# Patient Record
Sex: FEMALE | Race: WHITE | NOT HISPANIC OR LATINO | URBAN - METROPOLITAN AREA
[De-identification: names, ages, dates, MRNs, and addresses within clinical notes are randomized per-mention and may not be internally consistent; named-entity substitution may affect disease eponyms.]

---

## 2020-12-11 ENCOUNTER — TELEPHONE (OUTPATIENT)
Dept: RADIATION ONCOLOGY | Facility: HOSPITAL | Age: 55
End: 2020-12-11

## 2020-12-11 DIAGNOSIS — C55 MALIGNANT NEOPLASM OF UTERUS, UNSPECIFIED SITE (HCC): Primary | ICD-10-CM

## 2020-12-11 NOTE — TELEPHONE ENCOUNTER
Called patient's daughter Socorro at [phone] and tried to schedule patient for consultation. Had to [facility]. UL Dr. Jama's office referred patient for uterine cancer. [phone] option 1 Christy is the contact. According to Christy patient is wanting to try and schedule same day appointments with Dr. Neal and Dr. Glovre.

## 2020-12-14 ENCOUNTER — TELEPHONE (OUTPATIENT)
Dept: RADIATION ONCOLOGY | Facility: HOSPITAL | Age: 55
End: 2020-12-14

## 2020-12-14 NOTE — TELEPHONE ENCOUNTER
I requested the CT of the Abdomen and Pelvis from Klaus.  Kvng stated that we should get it on 12-.   Itzel Lund RN

## 2020-12-15 ENCOUNTER — TELEPHONE (OUTPATIENT)
Dept: ONCOLOGY | Facility: HOSPITAL | Age: 55
End: 2020-12-15

## 2020-12-15 NOTE — TELEPHONE ENCOUNTER
Case Management/ Note    Patient Name: Xin Nj  YOB: 1965  MRN #: [id number]    OSW called patient at the request of soheila Newsome as patient has issues with transportation. She said she does not have a vehicle and has to rely on others for everything, including medical transportation. She has medical transportation available to her via [employer]. She said she has tried using them before but oftentimes they call to let her know she does not have a ride or does not show. She said her daughter would be unable to leave work, especially daily, due to her job. She could not readily think of any other friends or family that could help. She said she was told by her doctor's office that there was transportation available for cancer patients from [place], which is why she choose to come here vs [facility].  OSW asked her what that was and she did not know specifics. She was asked to call her doctor's office and ask specifically so we could work together to see if we can get her transportation. She said she would do this and call back. The [employer] is no longer offering transportation due to covid.     Electronically signed by:   Eladia Laguna LCSW, OSW-C  12/15/20, 10:55 EST

## 2020-12-16 ENCOUNTER — TELEPHONE (OUTPATIENT)
Dept: ONCOLOGY | Facility: HOSPITAL | Age: 55
End: 2020-12-16

## 2020-12-16 ENCOUNTER — TELEPHONE (OUTPATIENT)
Dept: ONCOLOGY | Facility: CLINIC | Age: 55
End: 2020-12-16

## 2020-12-16 NOTE — TELEPHONE ENCOUNTER
Case Management/ Note    Patient Name: Xin Nj  YOB: 1965  MRN #: [id number]    OSW called patient at the request of soheila Newsome. Patient is alert and oriented to person, place and time. She said she does not have transportation for her upcoming appointments but will call [employer] to see if they will pick her up. maira Newsomer is trying to get her into see Dr. Neal this week and she has an appointment with Dr. Glover on Monday.     OSW spoke with Lamar, practice manager and Paresh, supervisor about patient's barriers to transportation and that she is closer to [facility] ([address] miles one [address] to [facility]; [address] miles [address] to [facility]). Suggested that patient be transferred to [facility] so that she has better possibility of [employer] helping her with transportation thus having better continuity of care.     Electronically signed by:   Eladia Laguna LCSW, OSW-C  12/16/20, 16:12 EST

## 2020-12-16 NOTE — TELEPHONE ENCOUNTER
Have been trying to call patient to make appointment for her to see dr soriano the voicemail is full try yesterday and today

## 2020-12-17 ENCOUNTER — TELEPHONE (OUTPATIENT)
Dept: RADIATION ONCOLOGY | Facility: HOSPITAL | Age: 55
End: 2020-12-17

## 2020-12-17 NOTE — TELEPHONE ENCOUNTER
Spoke with Xin to explain [facility] would be providing her Radiation Treatments.   Called [facility] to explain they would need to send a referral to [facility] for Xin Clem due to we cannot provide transportation for Xin. Her insurance will provide but, to a closer facility with the same services. Xin understood why the transfer was being made. I cancelled her consult appt with Dr Glover for 12/21/2020.

## 2020-12-31 ENCOUNTER — TELEPHONE (OUTPATIENT)
Dept: RADIATION ONCOLOGY | Facility: HOSPITAL | Age: 55
End: 2020-12-31

## 2021-01-04 ENCOUNTER — TELEPHONE (OUTPATIENT)
Dept: ONCOLOGY | Facility: CLINIC | Age: 56
End: 2021-01-04

## 2021-01-04 NOTE — TELEPHONE ENCOUNTER
Call patient regarding appointment for today the phone ring she answer then hung up so when I call back no answer had to leave a message

## 2021-01-06 ENCOUNTER — TELEPHONE (OUTPATIENT)
Dept: ONCOLOGY | Facility: HOSPITAL | Age: 56
End: 2021-01-06

## 2021-01-06 NOTE — TELEPHONE ENCOUNTER
Case Management/ Note    Patient Name: Xin Nj  YOB: 1965  MRN #: [id number]    OSW received a call from Paresh, supervisor who said patient was having challenges with the transfer to [facility] for cancer care as she has barriers to transportation. OSW called and left a message to speak with the oncology social worker at [facility]. Also, called Xin and left a message requesting call back.     Electronically signed by:   Eladia Laguna LCSW, OSW-C  01/06/21, 10:45 EST

## 2021-01-07 ENCOUNTER — TELEPHONE (OUTPATIENT)
Dept: ONCOLOGY | Facility: HOSPITAL | Age: 56
End: 2021-01-07

## 2021-01-07 NOTE — TELEPHONE ENCOUNTER
Case Management/ Note    Patient Name: Xin Nj  YOB: 1965  MRN #: [id number]    OSW received a call back from Brandi  at [facility]. She was not aware of patient referral. She said they have the same challenges with transportation services as is here. She is willing to reach out to the patient but OSW declined until she could get more information from the patient. OSW called patient and left a message requesting call back.     OSW received a call back from Xin requesting call back. Called back and VM is full.       Electronically signed by:   Eladia Laguna LCSW, OSW-C  01/07/21, 16:20 EST

## 2021-01-08 ENCOUNTER — TELEPHONE (OUTPATIENT)
Dept: ONCOLOGY | Facility: CLINIC | Age: 56
End: 2021-01-08

## 2021-01-12 ENCOUNTER — TELEPHONE (OUTPATIENT)
Dept: ONCOLOGY | Facility: CLINIC | Age: 56
End: 2021-01-12

## 2021-01-12 ENCOUNTER — APPOINTMENT (OUTPATIENT)
Dept: LAB | Facility: HOSPITAL | Age: 56
End: 2021-01-12

## 2021-01-12 NOTE — TELEPHONE ENCOUNTER
LM ASKING PT TO CALL MY DIRECT LINE TO RESCHEDULE MISSED NEW PATIENT APPOINTMENT. DR DICKEY HAS ASKED THAT I OFFER  PT 1/18/21 @ 830AM.

## 2021-01-14 ENCOUNTER — TELEPHONE (OUTPATIENT)
Dept: ONCOLOGY | Facility: CLINIC | Age: 56
End: 2021-01-14

## 2021-01-14 NOTE — TELEPHONE ENCOUNTER
LEFT ADDITIONAL MESSAGE ASKING PT TO CALL MY DIRECT LINE TO R/S HER MISSED NEW PATIENT APPOINTMENT

## 2021-01-20 ENCOUNTER — TELEPHONE (OUTPATIENT)
Dept: RADIATION ONCOLOGY | Facility: HOSPITAL | Age: 56
End: 2021-01-20

## 2021-01-20 NOTE — TELEPHONE ENCOUNTER
"Spoke with Xin to confirm her consult appt with Dr Glover on 1/22/21. I asked if she had transportation scheduled. She stated \"yes\". I asked her if she needed to be transferred to schedule appt with Val. Call was transferred to Lora ROSE   "

## 2021-01-21 NOTE — PROGRESS NOTES
Hematology/Oncology Outpatient Consultation    Patient name: Xin Nj  : 1965  MRN: [id number]  Primary Care Physician: Park Dubose MD  Referring Physician: Park Dubose,*  Reason For Consult:     Chief Complaint   Patient presents with   • Appointment     Uterine cancer       History of Present Illness:    This is a 55-year-old female who developed postmenopausal vaginal bleeding for approximately one and half years.  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  On 2020-10-31 patient had CT scan of the abdomen and pelvis with contrast there were no acute findings in the abdomen or pelvis.  Stable hypodense lesion in the liver   Patient was then referred to Dr. Jama with [facility] and on 11/10/2020 patient underwent   total robotic hysterectomy, bilateral salpingo-oophorectomy and sentinel lymphadenectomy.  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  There was no significant pathology identified in the right or left ovaries.  One left pelvic sentinel lymph node was benign for malignancy.  On the right pelvic  7 benign lymph nodes we are removed.  The mass measured 7 cm in size.  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  There was evidence of lymphovascular invasion.  Pathology stage is pT1bN0 FIGO 1B    Dr. Jama and team have recommended adjuvant chemotherapy with carboplatinum and Taxol sandwiched with external beam radiation and vaginal cuff brachytherapy.  Total of 6 cycles of chemotherapy with carboplatinum and Taxol has been recommended with 3 cycles prior to radiation and subsequent 3 cycles after radiation in a sandwich fashion.    Patient has a history of right breast cancer that was diagnosed in .  She underwent right lumpectomy followed by chemotherapy for 4 cycles.   Adjuvant radiation was recommended but she declined radiation treatments.  Patient states that her tumor was infiltrated on her right chest wall and for that reason she aborted all future treatments.  She has been without any evidence of relapsed disease.  Patient still has her port from 2003 which has not been flushed or cared for since then.    She is accompanied today by 2 of her daughters one by phone for this visit    Past Medical History:   Diagnosis Date   • Anxiety    • COPD (chronic obstructive pulmonary disease) (CMS/HCC)    • COPD (chronic obstructive pulmonary disease) (CMS/HCC)    • Depression    • Encounter for antineoplastic radiation therapy    • History of right breast cancer    • Osteoporosis        Past Surgical History:   Procedure Laterality Date   • BREAST LUMPECTOMY Right    • HYSTERECTOMY  2020         Current Outpatient Medications:   •  albuterol sulfate  (90 Base) MCG/ACT inhaler, inhale 1 PUFF every 4 HOURS as needed, Disp: , Rfl:   •  ALPRAZolam XR 0.5 MG 24 hr tablet, TAKE ONE TABLET BY MOUTH EVERY MORNING AS NEEDED, Disp: , Rfl:   •  budesonide-formoterol (SYMBICORT) 160-4.5 MCG/ACT inhaler, Inhale 2 puffs 2 (Two) Times a Day., Disp: , Rfl:   •  cholecalciferol (VITAMIN D3) 25 MCG (1000 UT) tablet, Take 1,000 Units by mouth Daily., Disp: , Rfl:   •  cloNIDine (CATAPRES) 0.2 MG tablet, clonidine HCl 0.2 mg tablet, Disp: , Rfl:   •  diclofenac sodium (VOTAREN XR) 100 MG 24 hr tablet, diclofenac  mg tablet,extended release 24 hr, Disp: , Rfl:   •  gabapentin (NEURONTIN) 600 MG tablet, Take 600 mg by mouth 3 (Three) Times a Day., Disp: , Rfl:   •  HYDROcodone-acetaminophen (NORCO) 7.5-325 MG per tablet, Take 1 tablet by mouth Every 8 (Eight) Hours As Needed., Disp: , Rfl:     No Known Allergies      There is no immunization history on file for this patient.    Family History   Problem Relation Age of Onset   • Stroke Mother    • Lung cancer Brother        Cancer-related family  "history includes Lung cancer in her brother.    Social History     Tobacco Use   • Smoking status: Current Every Day Smoker   Substance Use Topics   • Alcohol use: Not Currently   • Drug use: Never       ROS:    Review of Systems   Constitutional: Negative for chills and fever.   HENT: Negative for ear pain, mouth sores, nosebleeds and sore throat.    Eyes: Negative for photophobia and visual disturbance.   Respiratory: Negative for wheezing and stridor.    Cardiovascular: Negative for chest pain and palpitations.   Gastrointestinal: Negative for abdominal pain, diarrhea, nausea and vomiting.   Endocrine: Negative for cold intolerance and heat intolerance.   Genitourinary: Negative for dysuria and hematuria.   Musculoskeletal: Negative for joint swelling and neck stiffness.   Skin: Negative for color change and rash.   Neurological: Negative for seizures and syncope.   Hematological: Negative for adenopathy.        No obvious bleeding   Psychiatric/Behavioral: Negative for agitation, confusion and hallucinations.       Objective:    Vitals:    01/22/21 1216   BP: 147/88   Pulse: 82   Resp: 18   Temp: 96.9 °F (36.1 °C)   Weight: 96.9 kg (213 lb 9.6 oz)   Height: 170.2 cm (67\")   PainSc: 0-No pain     Body mass index is 33.45 kg/m².    ECOG  (0) Fully active, able to carry on all predisease performance without restriction    Physical Exam:  Physical Exam  Vitals signs and nursing note reviewed.   Constitutional:       General: She is not in acute distress.     Appearance: She is not diaphoretic.   HENT:      Head: Normocephalic and atraumatic.   Eyes:      General: No scleral icterus.        Right eye: No discharge.         Left eye: No discharge.      Conjunctiva/sclera: Conjunctivae normal.   Neck:      Musculoskeletal: Normal range of motion and neck supple.      Thyroid: No thyromegaly.   Cardiovascular:      Rate and Rhythm: Normal rate and regular rhythm.      Heart sounds: Normal heart sounds. No friction rub. " No gallop.    Pulmonary:      Effort: Pulmonary effort is normal. No respiratory distress.      Breath sounds: No stridor. No wheezing.   Abdominal:      General: Bowel sounds are normal.      Palpations: Abdomen is soft. There is no mass.      Tenderness: There is no abdominal tenderness. There is no guarding or rebound.   Musculoskeletal: Normal range of motion.         General: No tenderness.   Lymphadenopathy:      Cervical: No cervical adenopathy.   Skin:     General: Skin is warm.      Findings: No erythema or rash.   Neurological:      Mental Status: She is alert and oriented to person, place, and time.      Motor: No abnormal muscle tone.   Psychiatric:         Behavior: Behavior normal.         RECENT LABS  WBC   Date Value Ref Range Status   01/22/2021 8.67 3.40 - 10.80 10*3/mm3 Final     RBC   Date Value Ref Range Status   01/22/2021 4.42 3.77 - 5.28 10*6/mm3 Final     Hemoglobin   Date Value Ref Range Status   01/22/2021 13.3 12.0 - 15.9 g/dL Final     Hematocrit   Date Value Ref Range Status   01/22/2021 42.3 34.0 - 46.6 % Final     MCV   Date Value Ref Range Status   01/22/2021 95.7 79.0 - 97.0 fL Final     MCH   Date Value Ref Range Status   01/22/2021 30.1 26.6 - 33.0 pg Final     MCHC   Date Value Ref Range Status   01/22/2021 31.4 (L) 31.5 - 35.7 g/dL Final     RDW   Date Value Ref Range Status   01/22/2021 14.4 12.3 - 15.4 % Final     RDW-SD   Date Value Ref Range Status   01/22/2021 48.3 37.0 - 54.0 fl Final     MPV   Date Value Ref Range Status   01/22/2021 8.7 6.0 - 12.0 fL Final     Platelets   Date Value Ref Range Status   01/22/2021 233 140 - 450 10*3/mm3 Final     Neutrophil %   Date Value Ref Range Status   01/22/2021 72.7 42.7 - 76.0 % Final     Lymphocyte %   Date Value Ref Range Status   01/22/2021 21.2 19.6 - 45.3 % Final     Monocyte %   Date Value Ref Range Status   01/22/2021 4.3 (L) 5.0 - 12.0 % Final     Eosinophil %   Date Value Ref Range Status   01/22/2021 1.3 0.3 - 6.2 % Final      Basophil %   Date Value Ref Range Status   01/22/2021 0.5 0.0 - 1.5 % Final     Neutrophils, Absolute   Date Value Ref Range Status   01/22/2021 6.31 1.70 - 7.00 10*3/mm3 Final     Lymphocytes, Absolute   Date Value Ref Range Status   01/22/2021 1.84 0.70 - 3.10 10*3/mm3 Final     Monocytes, Absolute   Date Value Ref Range Status   01/22/2021 0.37 0.10 - 0.90 10*3/mm3 Final     Eosinophils, Absolute   Date Value Ref Range Status   01/22/2021 0.11 0.00 - 0.40 10*3/mm3 Final     Basophils, Absolute   Date Value Ref Range Status   01/22/2021 0.04 0.00 - 0.20 10*3/mm3 Final       No results found for: GLUCOSE, BUN, CREATININE, EGFRIFNONA, EGFRIFAFRI, BCR, K, CO2, CALCIUM, PROTENTOTREF, ALBUMIN, LABIL2, BILIRUBIN, AST, ALT      Assessment/Plan   Malignant neoplasm of uterus, unspecified site (CMS/HCC)  - CBC & Differential  - Comprehensive Metabolic Panel  -   - IR Port Placement  - Ambulatory Referral to ONC Social Work  - MRSA Screen Culture (Outpatient) - Swab, Nares  - IR Removal Tunnel CV With Port Different Site    Malignant neoplasm of endometrium (CMS/HCC)   -   - IR Port Placement  - Ambulatory Referral to ONC Social Work    Depression, unspecified depression type  - IR Port Placement  - Ambulatory Referral to ONC Social Work    Anxiety  - IR Port Placement  - Ambulatory Referral to ONC Social Work      1. 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 Adjuvant chemotherapy and radiation has been recommended by Dr. Jama with combination of carboplatinum and Taxol with external beam radiation and vaginal brachytherapy in sandwiched fashion.  3. History of invasive right breast cancer, status post right lumpectomy followed by chemotherapy for 4 cycles.  Will call [facility] for those records  4. Left Mediport since 2003: Has not been flushed in more than 17 years: We will plan for  removal    Discussion    I have reviewed her imaging studies, path report and clinical history.  I have also fully examined patient.  She has completely resected carcinosarcoma of the endometrium.  She understand that she has a high risk malignancy with high risk for relapse without additional adjuvant treatments.  Patient will receive combination of carboplatinum and Taxol every 21 days for 3 cycles followed by WPRT and brachytherapy, followed by additional 3 cycles of chemotherapy in a sandwich fashion.  Patient has an appointment to see Dr. Glover this afternoon.  I reviewed the side effects of chemotherapy with carboplatinum and Taxol with her to include but not limited to:    Chemotherapy side effects include, but not limited to, nausea, vomiting, bone marrow suppression, which can result in blood, platelet transfusion. There is also risk of permanent bone marrow destruction, which can cause myelodysplastic syndrome or leukemia years down the line. There is risk of infection which can result in hospitalization and even death. There is also risk of fatigue, asthenia, alopecia which could become permanent. Chemo will help to reduce risk of relapse of cancer, but does not eliminate risk completely.    I also discussed that specifically Taxol chemotherapy can lead to hypersensitivity reaction, peripheral neuropathy, there is also a risk of permanent alopecia less than 1%, fluid retention, myalgias.    Carboplatinum can also lead to significant thrombocytopenia which may require platelet transfusions    Patient has probable nonfunctioning port on the left chest wall, I have recommended removal and insertion of a new port for chemotherapy              Plans:    · Left Mediport removal by IR  · Schedule chemotherapy with carboplatinum and Taxol with Neulasta.  Ordered in beacon  · Chemotherapy education.  · All questions answered.      Thank you very much for allowing me to participate in the care of Leidy, I will keep  you updated on her progress      I spent 60 total minutes, face-to-face, caring for Xin today.  80% of this time involved counseling and/or coordination of care as documented within this note.

## 2021-01-22 ENCOUNTER — CONSULT (OUTPATIENT)
Dept: ONCOLOGY | Facility: CLINIC | Age: 56
End: 2021-01-22

## 2021-01-22 ENCOUNTER — LAB (OUTPATIENT)
Dept: LAB | Facility: HOSPITAL | Age: 56
End: 2021-01-22

## 2021-01-22 ENCOUNTER — CONSULT (OUTPATIENT)
Dept: RADIATION ONCOLOGY | Facility: HOSPITAL | Age: 56
End: 2021-01-22

## 2021-01-22 VITALS
HEIGHT: 67 IN | SYSTOLIC BLOOD PRESSURE: 147 MMHG | DIASTOLIC BLOOD PRESSURE: 88 MMHG | WEIGHT: 213.6 LBS | TEMPERATURE: 96.9 F | HEART RATE: 82 BPM | RESPIRATION RATE: 18 BRPM | BODY MASS INDEX: 33.53 KG/M2

## 2021-01-22 VITALS
WEIGHT: 213 LBS | BODY MASS INDEX: 33.43 KG/M2 | OXYGEN SATURATION: 98 % | DIASTOLIC BLOOD PRESSURE: 87 MMHG | SYSTOLIC BLOOD PRESSURE: 142 MMHG | HEART RATE: 73 BPM | HEIGHT: 67 IN | TEMPERATURE: 96.9 F

## 2021-01-22 DIAGNOSIS — F32.A DEPRESSION, UNSPECIFIED DEPRESSION TYPE: ICD-10-CM

## 2021-01-22 DIAGNOSIS — C55 MALIGNANT NEOPLASM OF UTERUS, UNSPECIFIED SITE (HCC): Primary | ICD-10-CM

## 2021-01-22 DIAGNOSIS — F41.9 ANXIETY: ICD-10-CM

## 2021-01-22 DIAGNOSIS — C54.1 MALIGNANT NEOPLASM OF ENDOMETRIUM (HCC): ICD-10-CM

## 2021-01-22 DIAGNOSIS — C55 MALIGNANT NEOPLASM OF UTERUS, UNSPECIFIED SITE (HCC): ICD-10-CM

## 2021-01-22 PROBLEM — T45.1X5A CHEMOTHERAPY INDUCED NEUTROPENIA: Status: ACTIVE | Noted: 2021-01-22

## 2021-01-22 PROBLEM — D70.1 CHEMOTHERAPY INDUCED NEUTROPENIA: Status: ACTIVE | Noted: 2021-01-22

## 2021-01-22 LAB
ALBUMIN SERPL-MCNC: 4 G/DL (ref 3.5–5.2)
ALBUMIN/GLOB SERPL: 1.3 G/DL
ALP SERPL-CCNC: 94 U/L (ref 39–117)
ALT SERPL W P-5'-P-CCNC: 12 U/L (ref 1–33)
ANION GAP SERPL CALCULATED.3IONS-SCNC: 6 MMOL/L (ref 5–15)
AST SERPL-CCNC: 15 U/L (ref 1–32)
BASOPHILS # BLD AUTO: 0.04 10*3/MM3 (ref 0–0.2)
BASOPHILS NFR BLD AUTO: 0.5 % (ref 0–1.5)
BILIRUB SERPL-MCNC: 0.2 MG/DL (ref 0–1.2)
BUN SERPL-MCNC: 13 MG/DL (ref 6–20)
BUN/CREAT SERPL: 17.3 (ref 7–25)
CALCIUM SPEC-SCNC: 9 MG/DL (ref 8.6–10.5)
CANCER AG125 SERPL QL: 8.1 U/ML (ref 0–38.1)
CHLORIDE SERPL-SCNC: 106 MMOL/L (ref 98–107)
CO2 SERPL-SCNC: 30 MMOL/L (ref 22–29)
CREAT SERPL-MCNC: 0.75 MG/DL (ref 0.57–1)
DEPRECATED RDW RBC AUTO: 48.3 FL (ref 37–54)
EOSINOPHIL # BLD AUTO: 0.11 10*3/MM3 (ref 0–0.4)
EOSINOPHIL NFR BLD AUTO: 1.3 % (ref 0.3–6.2)
ERYTHROCYTE [DISTWIDTH] IN BLOOD BY AUTOMATED COUNT: 14.4 % (ref 12.3–15.4)
GFR SERPL CREATININE-BSD FRML MDRD: 80 ML/MIN/1.73
GLOBULIN UR ELPH-MCNC: 3.1 GM/DL
GLUCOSE SERPL-MCNC: 98 MG/DL (ref 65–99)
HCT VFR BLD AUTO: 42.3 % (ref 34–46.6)
HGB BLD-MCNC: 13.3 G/DL (ref 12–15.9)
LYMPHOCYTES # BLD AUTO: 1.84 10*3/MM3 (ref 0.7–3.1)
LYMPHOCYTES NFR BLD AUTO: 21.2 % (ref 19.6–45.3)
MCH RBC QN AUTO: 30.1 PG (ref 26.6–33)
MCHC RBC AUTO-ENTMCNC: 31.4 G/DL (ref 31.5–35.7)
MCV RBC AUTO: 95.7 FL (ref 79–97)
MONOCYTES # BLD AUTO: 0.37 10*3/MM3 (ref 0.1–0.9)
MONOCYTES NFR BLD AUTO: 4.3 % (ref 5–12)
NEUTROPHILS NFR BLD AUTO: 6.31 10*3/MM3 (ref 1.7–7)
NEUTROPHILS NFR BLD AUTO: 72.7 % (ref 42.7–76)
PLATELET # BLD AUTO: 233 10*3/MM3 (ref 140–450)
PMV BLD AUTO: 8.7 FL (ref 6–12)
POTASSIUM SERPL-SCNC: 4.1 MMOL/L (ref 3.5–5.2)
PROT SERPL-MCNC: 7.1 G/DL (ref 6–8.5)
RBC # BLD AUTO: 4.42 10*6/MM3 (ref 3.77–5.28)
SODIUM SERPL-SCNC: 142 MMOL/L (ref 136–145)
WBC # BLD AUTO: 8.67 10*3/MM3 (ref 3.4–10.8)

## 2021-01-22 PROCEDURE — 36415 COLL VENOUS BLD VENIPUNCTURE: CPT

## 2021-01-22 PROCEDURE — 85025 COMPLETE CBC W/AUTO DIFF WBC: CPT

## 2021-01-22 PROCEDURE — 80053 COMPREHEN METABOLIC PANEL: CPT

## 2021-01-22 PROCEDURE — 99205 OFFICE O/P NEW HI 60 MIN: CPT | Performed by: RADIOLOGY

## 2021-01-22 PROCEDURE — 99205 OFFICE O/P NEW HI 60 MIN: CPT | Performed by: INTERNAL MEDICINE

## 2021-01-22 PROCEDURE — 86304 IMMUNOASSAY TUMOR CA 125: CPT

## 2021-01-22 RX ORDER — MELATONIN
1000 DAILY
COMMUNITY
Start: 2020-10-19

## 2021-01-22 RX ORDER — HYDROCODONE BITARTRATE AND ACETAMINOPHEN 7.5; 325 MG/1; MG/1
1 TABLET ORAL EVERY 8 HOURS PRN
COMMUNITY
Start: 2021-01-05 | End: 2021-03-31

## 2021-01-22 RX ORDER — GABAPENTIN 600 MG/1
600 TABLET ORAL 3 TIMES DAILY
COMMUNITY
Start: 2020-12-29 | End: 2021-06-09

## 2021-01-22 RX ORDER — ALPRAZOLAM 0.5 MG/1
TABLET, EXTENDED RELEASE ORAL
COMMUNITY
Start: 2020-12-23 | End: 2021-11-12

## 2021-01-22 RX ORDER — ALBUTEROL SULFATE 90 UG/1
AEROSOL, METERED RESPIRATORY (INHALATION)
COMMUNITY
Start: 2020-12-29 | End: 2021-06-09

## 2021-01-22 RX ORDER — BUDESONIDE AND FORMOTEROL FUMARATE DIHYDRATE 160; 4.5 UG/1; UG/1
2 AEROSOL RESPIRATORY (INHALATION) 2 TIMES DAILY
COMMUNITY
Start: 2020-12-31 | End: 2021-06-09

## 2021-01-22 RX ORDER — CLONIDINE HYDROCHLORIDE 0.2 MG/1
TABLET ORAL
Status: ON HOLD | COMMUNITY
End: 2022-06-07

## 2021-01-22 NOTE — PROGRESS NOTES
Radiation Oncology Consult Note    Name: Xin Nj  YOB: 1965  MRN #: [id number]  Date of Service: 1/22/2021  Referring Provider: Jayme Jama MD  Primary Care Provider: Park Dubose MD    DIAGNOSIS: [id number] FIGO IB, carcinosarcoma of the uterus  Encounter Diagnosis   Name Primary?   • Malignant neoplasm of uterus, unspecified site (CMS/HCC) Yes       REASON FOR CONSULTATION/CHIEF COMPLAINT:  I was asked to see the patient at the request of the referring provider noted below for advice and recommendations regarding this diagnosis and the role of radiation therapy.                              REQUESTING PHYSICIAN:  Jayme Jama MD    RECORDS OBTAINED:  Records of the patients history including those obtained from the referring provider were reviewed and summarized in detail.    HISTORY OF PRESENT ILLNESS:  Xin Nj is a 55 y.o. female who developed postmenopausal vaginal bleeding that was sporadic and intermittent for an interval of  approximately one and half years.  On 10/21/2020 patient had endometrial biopsy performed by her gynecologist and pathology revealed endometrial adenocarcinoma FIGO grade 3.  On 2020-10-31 patient had CT scan of the abdomen and pelvis with contrast with no acute findings in the abdomen or pelvis.  Stable hypodense lesion in the liver      Mrs. Nj was then referred to Dr. Jama with [facility] and on 11/10/2020 patient underwent a total robotic hysterectomy, bilateral salpingo-oophorectomy and sentinel lymphadenectomy.  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  There was no significant pathology identified in the right or left ovaries.  One left pelvic sentinel lymph node was benign for malignancy.  On the right,  7 benign lymph nodes were removed.  The mass measured 7 cm in size.  Histology was found to be carcinosarcoma with myometrial invasion greater than 80%.  There was  evidence of lymphovascular invasion.  Pathology stage is pT1bN0 FIGO 1B     Dr. Jama and his team have recommended adjuvant chemotherapy with carboplatinum and Taxol sandwiched with external beam radiation and vaginal cuff brachytherapy.  Total of 6 cycles of chemotherapy with carboplatinum and Taxol has been recommended with 3 cycles prior to radiation and subsequent 3 cycles after radiation in a sandwich fashion.     Patient has a history of right breast cancer that was diagnosed in 2003.  She underwent right lumpectomy followed by chemotherapy for 4 cycles.  Adjuvant radiation was recommended but she declined radiation treatments.   She has been without any evidence of relapsed disease.  Patient still has her port from 2003 which has not been flushed or cared for since then--Medical oncology is taking management of this today.     She is accompanied today by 2 of her daughters one by phone for this visit    The following portions of the patient's history were reviewed and updated as appropriate: allergies, current medications, past family history, past medical history, past social history, past surgical history and problem list. Reviewed with the patient and remain unchanged.    PAST MEDICAL HISTORY:  she  has a past medical history of Anxiety, COPD (chronic obstructive pulmonary disease) (CMS/HCC), COPD (chronic obstructive pulmonary disease) (CMS/HCC), Depression, Encounter for antineoplastic radiation therapy, History of right breast cancer, and Osteoporosis.  MEDICATIONS:   Current Outpatient Medications:   •  albuterol sulfate  (90 Base) MCG/ACT inhaler, inhale 1 PUFF every 4 HOURS as needed, Disp: , Rfl:   •  ALPRAZolam XR 0.5 MG 24 hr tablet, TAKE ONE TABLET BY MOUTH EVERY MORNING AS NEEDED, Disp: , Rfl:   •  budesonide-formoterol (SYMBICORT) 160-4.5 MCG/ACT inhaler, Inhale 2 puffs 2 (Two) Times a Day., Disp: , Rfl:   •  cholecalciferol (VITAMIN D3) 25 MCG (1000 UT) tablet, Take 1,000 Units by  mouth Daily., Disp: , Rfl:   •  cloNIDine (CATAPRES) 0.2 MG tablet, clonidine HCl 0.2 mg tablet, Disp: , Rfl:   •  diclofenac sodium (VOTAREN XR) 100 MG 24 hr tablet, diclofenac  mg tablet,extended release 24 hr, Disp: , Rfl:   •  gabapentin (NEURONTIN) 600 MG tablet, Take 600 mg by mouth 3 (Three) Times a Day., Disp: , Rfl:   •  HYDROcodone-acetaminophen (NORCO) 7.5-325 MG per tablet, Take 1 tablet by mouth Every 8 (Eight) Hours As Needed., Disp: , Rfl:   ALLERGIES: No Known Allergies  PAST SURGICAL HISTORY: she has a past surgical history that includes Breast lumpectomy (Right) and Hysterectomy (2020).  PREVIOUS RADIOTHERAPY OR CHEMOTHERAPY: As noted above, declined prior EBRT for her breast cancer.  FAMILY HISTORY: her family history includes Lung cancer in her brother; Stroke in her mother.  SOCIAL HISTORY: she  reports that she has been smoking. She does not have any smokeless tobacco history on file. She reports previous alcohol use. She reports that she does not use drugs.  PAIN AND PAIN MANAGEMENT: There were no vitals filed for this visit.  NUTRITIONAL STATUS:  Otherwise no issues  KPS: 70  PHQ-9 Total Score: Negative distress screening tool.    Review of Systems:   General: No fevers, chills, weight change, or drenching night sweats. Skin: No rashes or jaundice.  HEENT: No change in vision or hearing, no headaches.  Neck: No dysphagia or masses.  Heme/Lymph: No easy bruising or bleeding.  Respiratory System: No shortness of breath or cough.  Cardiovascular: No chest pain, palpitations, or dyspnea on exertion.  - Pacemaker. GI: No nausea, vomiting, diarrhea, melena, or hematochezia.  : No dysuria or hematuria.  Endocrine: No heat or cold intolerance. Musculoskeletal: No myalgias or arthralgias.  Neuro: No weakness, numbness, syncope, or seizures. Psych: No mood changes or depression. Ext: Denies swelling.        Objective     Vitals:  Vitals:    01/22/21 1319   BP: 142/87   Pulse: 73   Temp: 96.9 °F  (36.1 °C)   SpO2: 98%     PHYSICAL EXAM:  GENERAL: in no apparent distress, sitting comfortably in room.    HEENT: normocephalic, atraumatic. Pupils are equal, round, reactive to light. Sclera anicteric. Conjunctiva not injected. Oropharynx without erythema, ulcerations or thrush.   NECK: Supple with no masses.  LYMPHATIC: no cervical, supraclavicular or axillary adenopathy appreciated bilaterally.   CARDIOVASCULAR: S1 & S2 detected; no murmurs, rubs or gallops.  CHEST: clear to auscultation bilaterally; no wheezes, crackles or rubs. Work of breathing normal.  ABDOMEN: bowel sounds present. Abdomen is soft, nontender, nondistended.   MUSCULOSKELETAL: no tenderness to palpation along the spine or scapulae. Normal range of motion.  EXTREMITIES: no clubbing, cyanosis, edema.  SKIN: no erythema, rashes, ulcerations noted.   NEUROLOGIC: cranial nerves II-XII grossly intact bilaterally. No focal neurologic deficits.  PSYCHIATRIC:  alert, aware, and appropriate.  GYN: well healed, no masses or abnormal findings on exam.       PERTINENT IMAGING/PATHOLOGY/LABS (Medical Decision Making):     COORDINATION OF CARE: A copy of this note is sent to the referring provider.    PATHOLOGY (Reviewed): As noted above.    IMAGING (Reviewed): As noted above.    LABS (Reviewed):WBC 1/22/201  8.67, Hgb 13.3, Plt 233    Assessment/Plan     ASSESSMENT AND PLAN:  1. Malignant neoplasm of uterus, unspecified site (CMS/HCC)       Stage IB, Carcinosarcoma of the Uterus with LVSI  -Adverse path findings--pathology type, size of 7.0 x 6.6 x 5.0 cm, 80% myometrial involvement, + LVSI but no lower uterine segment involvement and no cervical stromal involvement.  - All sentinel nodes were negative.  Date of surgery was 11/10/2020--delayed due to family wanting to go to [place], [place] for treatment and issues with transportation.  Family now wants treatment here and motivated/have transportation.  Saw Dr. Neal today to discuss systemic  therapy.    Discussed NCCN v1.2021 guidelines with her and her family.  She has a stage IB carcinosarcoma of the uterus and needs systemic therapy +/- EBRT, +/- vaginal brachytherapy.  It appears that [facility] tumor board has recommended all 3 therapies.  I will discuss with Dr. Jama but I feel that given the LVSI and tumor size/histology type that EBRT after chemo is appropriate.  I will discuss the role of VBT with Dr. Jama but favoring against it at this time.    Much time today spent in patient education.    This assessment comes from my review of the imaging, pathology, physician notes and other pertinent information as mentioned.    DISPOSITION: Placing on Tumor board here at [facility].  F/u likely after sandwich approach chemo 3 cycles -> EBRT-> 3 cycle more of chemo.        TIME SPENT WITH PATIENT:   I spent 60 minutes.       CC: No ref. provider found Park Dubose MD Daniel Metzinger, MG  MD Kristofer Urrutia MD  1/22/2021  1:20 PM EST

## 2021-01-25 ENCOUNTER — TELEPHONE (OUTPATIENT)
Dept: ONCOLOGY | Facility: CLINIC | Age: 56
End: 2021-01-25

## 2021-01-25 ENCOUNTER — APPOINTMENT (OUTPATIENT)
Dept: ONCOLOGY | Facility: HOSPITAL | Age: 56
End: 2021-01-25

## 2021-01-25 DIAGNOSIS — C55 MALIGNANT NEOPLASM OF UTERUS, UNSPECIFIED SITE (HCC): Primary | ICD-10-CM

## 2021-01-25 NOTE — TELEPHONE ENCOUNTER
I returned call to pt an she states she isn't able to make chemo teach appointment today. I transferred her to Lora Cantu to reschedule appointment along with discuss when port will be placed. Pt verbalized understanding.

## 2021-01-25 NOTE — TELEPHONE ENCOUNTER
Patient states she cannot come in today for treatment planning and needs to reschedule for tomorrow.  Patient scheduled for 1- at 2:30PM.  Eladia Maria RN and GLO Castillo notified.

## 2021-01-25 NOTE — TELEPHONE ENCOUNTER
Patient has chemo teach appt today.  She said she has already had this.    She states that she needs to have a port removed and a new one placed and be set up for chemotherapy.    Please call her asap to clarify.  [phone]

## 2021-01-26 ENCOUNTER — TELEPHONE (OUTPATIENT)
Dept: ONCOLOGY | Facility: CLINIC | Age: 56
End: 2021-01-26

## 2021-01-26 ENCOUNTER — HOSPITAL ENCOUNTER (OUTPATIENT)
Dept: ONCOLOGY | Facility: HOSPITAL | Age: 56
Setting detail: INFUSION SERIES
Discharge: HOME | End: 2021-01-26

## 2021-01-26 VITALS
RESPIRATION RATE: 18 BRPM | HEART RATE: 73 BPM | TEMPERATURE: 97.1 F | HEIGHT: 67 IN | SYSTOLIC BLOOD PRESSURE: 121 MMHG | WEIGHT: 209.3 LBS | BODY MASS INDEX: 32.85 KG/M2 | DIASTOLIC BLOOD PRESSURE: 82 MMHG

## 2021-01-26 DIAGNOSIS — C55 MALIGNANT NEOPLASM OF UTERUS, UNSPECIFIED SITE (HCC): Primary | ICD-10-CM

## 2021-01-26 NOTE — PROGRESS NOTES
[facility] Medical Oncology     Education for Administration of Chemotherapy and/or Biotherapy     01/26/21    Xin Nj  [id number]    Xin Nj is here today for education on their upcoming Chemotherapy and/or Biotherapy.     I will be going over their treatment options, obtain signed consent and answer any questions that they may have in regards to the administration of Taxol and Carboplatin.      Xin Nj has already consulted with Dr. Juliana Neal for the treatment of Uterine Cancer. The provider has gone over the same treatment options with the patient and answered their question prior to today's visit.   The goal of the treatment is to:    [x] Cure my cancer - means treatment that kills cancer cells to the point my doctor                                     cannot find them in my body and they will not grow back.    [] Control my cancer - means treatment that keeps cancer from spreading or growing.    [] Relieve my cancer symptoms - means treatment that helps problems such as pain or                                     pressure.     This treatment has been explained to Xin Clem. Alternative methods of treatment, if any, have been explained to Xin Nj as have the benefits and risks of each. Based on the physician's explanation of the benefits and risks of this treatment and any alternatives available, The patient agrees that the potential benefit's out weighs the risks involved. I have explained to the patient the most likely complications that might occur from this treatment. The patient understands that along with the treatment additional medications may be necessary to lesson the side effects. Possible side effect may include but are not limited to, any of the following, or a combination of the following:      Allergic Reaction Vision/Eye Changes Sexual Effects    []  High Blood Pressure  [x]  Skin and nail changes  []  Menopausal symptoms   [x]  Hearing Loss []  Ulceration at  injection site []  Menstrual irregularities   [x]  Fatigue [x]  Skin rash   []  Fertility effects   [x]  Constipation  [x]  Diarrhea [x]  Hair loss  []  Light/temperature sensitivity []  Heart damage  [x]  Liver damage   [x]  Loss of appetite []  Dizziness []  Lung damage   [x]  Mouth Sores [x]  Muscle aching or weakness [x]  Kidney damage   [x]  Taste Changes []  Forgetfulness [x]  Nerve damage   [x]  Nausea or Vomiting [x]  Risk of blood clots [x]  Weight gain/loss   []  Secondary malignancies [x]  Risk of anemia  [x]  Risk of bleeding/bruising      While receiving treatment, it has been explained to the patient with regards to their blood counts. This may include but not limited to CBC, Neutropenia ,Anemia, Thrombocytopenia. This handout has been explained and given to the patient.     It was explained to the patient about nutrition and how important it is while undergoing Chemotherapy and/or Biotherapy. Certain medications will be prescribed during the treatment which may change the way foods taste or smell. These changes may cause poor or no appetite. Food is fuel for your body, and if it does not get the fuel it needs, your body may become mal-nourished, which can lead to sever fatigue.It was discussed with the patient about calories and how to add high-calorie foods to their diet.  Protein was also mentioned in regards to how this will help make new cells for the body. Information was given to Xin Nj in regards to some good protein sources.   We also discussed with the patient how important it was to drink/eat every 2-3 hours while awake. We discussed fluid intake of at least 6 8 ounce glassed of liquids per day to stay hydrated. Some of those are listed below:     Water  Juice (fruit or vegetable)  Soda Sport Drinks Soup   Milk  Ensure, Boost, Glucerna Ice Cream Popsicles Jello   Milkshakes Pudding  Gatorade Sherbert Yogurt     It has been discussed with the patient the risks of becoming pregnant while  receiving Chemotherapy and/or Biotherapy. We also discussed the importance of using reliable barrier methods while participating in intimate activities as this may expose their partners to a potentially harmful drug.     Further home instructions were given to the patient in regards to symptoms, treatment and how to handle those situations as well as when to contact the treatment team or the providers office.     Xin Clem was given handouts on:   1. Complete Blood Counts and terminology  2. Nutrition during Cancer Therapy   3. Home Instructions    I have discussed and gone over the full consent with the patient and answered all their questions regarding the medication they are to receive.  Written information has been provided and reviewed with Xin Nj. The patient and their family had a chance to ask any questions about the treatment medications and are satisfied with the information that was provided to them.     The patient has read and completed the consent form. They understand the possible risks and benefits of the recommended treatment plan and voluntarily agree to undergo the planned treatment. Should they change their mind and decide to stop treatment at any time, they will notify the providers office.       Catrachita Martinez RN  1/26/2021  16:15 EST

## 2021-01-26 NOTE — TELEPHONE ENCOUNTER
Oncology Nursing Navigation Note    Attempted to locate records of patient's breast cancer treatment from 2003 (year provided by patient).     Called [facility]: no record of patient in their system  Called [facility]: no records for patient from 2003  Called Dr. George's office (name listed on a path report from 2010 or 2016 from breast): no record of patient in their system    Patient did not receive radiation therapy so I did not reach out to [facility].    Fela Garcia RN, BSN  Oncology Nurse Navigator  [facility]  [phone]  Office  [phone]  Fax

## 2021-01-26 NOTE — PROGRESS NOTES
Pt here for chemo teaching for Taxol and Carboplatin.  Pt given handouts and information discussed w/ pt and her daughter.  All questions answered.  Appointment for first treatment given and pt v/u.

## 2021-01-27 ENCOUNTER — TELEPHONE (OUTPATIENT)
Dept: ONCOLOGY | Facility: CLINIC | Age: 56
End: 2021-01-27

## 2021-01-27 DIAGNOSIS — C54.1 MALIGNANT NEOPLASM OF ENDOMETRIUM (HCC): Primary | ICD-10-CM

## 2021-01-27 RX ORDER — DEXAMETHASONE 4 MG/1
TABLET ORAL
Qty: 6 TABLET | Refills: 5 | Status: SHIPPED | OUTPATIENT
Start: 2021-01-27 | End: 2021-11-12

## 2021-01-27 RX ORDER — ONDANSETRON HYDROCHLORIDE 8 MG/1
8 TABLET, FILM COATED ORAL 3 TIMES DAILY PRN
Qty: 30 TABLET | Refills: 5 | Status: SHIPPED | OUTPATIENT
Start: 2021-01-27 | End: 2021-06-02 | Stop reason: SDUPTHER

## 2021-01-27 NOTE — TELEPHONE ENCOUNTER
Pt called asking about prescriptions. She states she was to receive numbing cream, anti-nausea meds, and pain meds. Reviewed treatment plan, will route decadron, zofran, and emla cream to Dr Neal and Jyoti CARRASQUILLO.

## 2021-01-28 RX ORDER — LIDOCAINE AND PRILOCAINE 25; 25 MG/G; MG/G
CREAM TOPICAL AS NEEDED
Qty: 30 G | Refills: 2 | Status: ON HOLD | OUTPATIENT
Start: 2021-01-28 | End: 2022-06-07

## 2021-02-04 ENCOUNTER — TELEPHONE (OUTPATIENT)
Dept: ONCOLOGY | Facility: CLINIC | Age: 56
End: 2021-02-04

## 2021-02-04 ENCOUNTER — HOSPITAL ENCOUNTER (OUTPATIENT)
Dept: INTERVENTIONAL RADIOLOGY/VASCULAR | Facility: HOSPITAL | Age: 56
Discharge: HOME | End: 2021-02-04
Admitting: RADIOLOGY

## 2021-02-04 VITALS
HEART RATE: 67 BPM | WEIGHT: 203 LBS | HEIGHT: 68 IN | TEMPERATURE: 98.7 F | OXYGEN SATURATION: 98 % | BODY MASS INDEX: 30.77 KG/M2 | SYSTOLIC BLOOD PRESSURE: 128 MMHG | RESPIRATION RATE: 16 BRPM | DIASTOLIC BLOOD PRESSURE: 81 MMHG

## 2021-02-04 DIAGNOSIS — C55 MALIGNANT NEOPLASM OF UTERUS, UNSPECIFIED SITE (HCC): ICD-10-CM

## 2021-02-04 LAB
APTT PPP: <20 SECONDS (ref 24–31)
BASOPHILS # BLD AUTO: 0.1 10*3/MM3 (ref 0–0.2)
BASOPHILS NFR BLD AUTO: 1 % (ref 0–1.5)
DEPRECATED RDW RBC AUTO: 48.1 FL (ref 37–54)
EOSINOPHIL # BLD AUTO: 0.2 10*3/MM3 (ref 0–0.4)
EOSINOPHIL NFR BLD AUTO: 2.2 % (ref 0.3–6.2)
ERYTHROCYTE [DISTWIDTH] IN BLOOD BY AUTOMATED COUNT: 14.9 % (ref 12.3–15.4)
HCT VFR BLD AUTO: 39.2 % (ref 34–46.6)
HGB BLD-MCNC: 13.2 G/DL (ref 12–15.9)
HOLD SPECIMEN: NORMAL
INR PPP: 1.06 (ref 0.93–1.1)
LYMPHOCYTES # BLD AUTO: 1.8 10*3/MM3 (ref 0.7–3.1)
LYMPHOCYTES NFR BLD AUTO: 26.1 % (ref 19.6–45.3)
MCH RBC QN AUTO: 30.3 PG (ref 26.6–33)
MCHC RBC AUTO-ENTMCNC: 33.7 G/DL (ref 31.5–35.7)
MCV RBC AUTO: 90 FL (ref 79–97)
MONOCYTES # BLD AUTO: 0.4 10*3/MM3 (ref 0.1–0.9)
MONOCYTES NFR BLD AUTO: 6.4 % (ref 5–12)
MRSA DNA SPEC QL NAA+PROBE: NORMAL
NEUTROPHILS NFR BLD AUTO: 4.5 10*3/MM3 (ref 1.7–7)
NEUTROPHILS NFR BLD AUTO: 64.3 % (ref 42.7–76)
NRBC BLD AUTO-RTO: 0 /100 WBC (ref 0–0.2)
PLATELET # BLD AUTO: 210 10*3/MM3 (ref 140–450)
PMV BLD AUTO: 7.9 FL (ref 6–12)
PROTHROMBIN TIME: 11.6 SECONDS (ref 9.6–11.7)
RBC # BLD AUTO: 4.36 10*6/MM3 (ref 3.77–5.28)
WBC # BLD AUTO: 7 10*3/MM3 (ref 3.4–10.8)

## 2021-02-04 PROCEDURE — 85730 THROMBOPLASTIN TIME PARTIAL: CPT | Performed by: RADIOLOGY

## 2021-02-04 PROCEDURE — 25010000003 HEPARIN LOCK FLUSH PER 10 UNITS: Performed by: RADIOLOGY

## 2021-02-04 PROCEDURE — 36598 INJ W/FLUOR EVAL CV DEVICE: CPT

## 2021-02-04 PROCEDURE — 0 IOPAMIDOL PER 1 ML: Performed by: INTERNAL MEDICINE

## 2021-02-04 PROCEDURE — 36415 COLL VENOUS BLD VENIPUNCTURE: CPT | Performed by: INTERNAL MEDICINE

## 2021-02-04 PROCEDURE — 85025 COMPLETE CBC W/AUTO DIFF WBC: CPT | Performed by: RADIOLOGY

## 2021-02-04 PROCEDURE — 87641 MR-STAPH DNA AMP PROBE: CPT | Performed by: INTERNAL MEDICINE

## 2021-02-04 PROCEDURE — 85610 PROTHROMBIN TIME: CPT | Performed by: RADIOLOGY

## 2021-02-04 RX ORDER — SODIUM CHLORIDE 0.9 % (FLUSH) 0.9 %
3-10 SYRINGE (ML) INJECTION AS NEEDED
Status: DISCONTINUED | OUTPATIENT
Start: 2021-02-04 | End: 2021-02-05 | Stop reason: HOSPADM

## 2021-02-04 RX ORDER — SODIUM CHLORIDE 0.9 % (FLUSH) 0.9 %
3 SYRINGE (ML) INJECTION EVERY 12 HOURS SCHEDULED
Status: DISCONTINUED | OUTPATIENT
Start: 2021-02-04 | End: 2021-02-05 | Stop reason: HOSPADM

## 2021-02-04 RX ORDER — HEPARIN SODIUM (PORCINE) LOCK FLUSH IV SOLN 100 UNIT/ML 100 UNIT/ML
SOLUTION INTRAVENOUS
Status: COMPLETED | OUTPATIENT
Start: 2021-02-04 | End: 2021-02-04

## 2021-02-04 RX ADMIN — IOPAMIDOL 3 ML: 755 INJECTION, SOLUTION INTRAVENOUS at 13:00

## 2021-02-04 RX ADMIN — HEPARIN SODIUM (PORCINE) LOCK FLUSH IV SOLN 100 UNIT/ML 500 UNITS: 100 SOLUTION at 13:05

## 2021-02-04 NOTE — PROGRESS NOTES
Pt instructed to get port flushed at least once a month when not in use.  Band aid to site currently that can be removed tomorrow. PT told to keep dressing dry and intact.

## 2021-02-04 NOTE — NURSING NOTE
asked this RN to access pt's left chest port to see if it will flush and/or give any blood return. Pt's left chest port was accessed using sterile technique. Pt's port is giving excellent blood return and is flushing easily.  informed and stated he will be performing an ICS procedure instead of a port removal and new port placement.

## 2021-02-04 NOTE — NURSING NOTE
Dr. Santos reports infusaport is in good position and has no blood clots. Infuspaort is okay to use and working perfectly.Pt was explained findings by Dr. Santos.

## 2021-02-04 NOTE — TELEPHONE ENCOUNTER
Contacted patient to schedule appointment for the week of 02/15/2021.   The only day that has openings is Friday 02/19/2021. She states she will have to call us back to confirm as she needs to verify with her daughter. She was on the way to get her port replaced. She asked me for the location and I told her where to go. She also asked me for the phone number. I gave that to her as well. She had no other questions.

## 2021-02-04 NOTE — NURSING NOTE
Pt brought to IR lab D, via stretcher, and was able to move herself from stretcher onto procedure table into supine position. Pt is awaiting Dr. Santos's arrival for start of ICS procedure.

## 2021-02-08 ENCOUNTER — TELEPHONE (OUTPATIENT)
Dept: ONCOLOGY | Facility: CLINIC | Age: 56
End: 2021-02-08

## 2021-02-08 ENCOUNTER — TELEPHONE (OUTPATIENT)
Dept: ONCOLOGY | Facility: HOSPITAL | Age: 56
End: 2021-02-08

## 2021-02-08 NOTE — TELEPHONE ENCOUNTER
Pt was a no show, called pt home phone number and they did not answer left message for her to call to reschedule.

## 2021-02-08 NOTE — TELEPHONE ENCOUNTER
Received message from Marisela Acevedo RN stating patient was no show for today's chemo and  appt.  Attempted to contact patient but had to LMV asking her to call back.

## 2021-02-09 ENCOUNTER — TELEPHONE (OUTPATIENT)
Dept: ONCOLOGY | Facility: CLINIC | Age: 56
End: 2021-02-09

## 2021-02-09 NOTE — TELEPHONE ENCOUNTER
Attempted to contact patient regarding missed appointment. There was no answer, so voicemail was left with callback number.

## 2021-02-10 ENCOUNTER — TELEPHONE (OUTPATIENT)
Dept: ONCOLOGY | Facility: HOSPITAL | Age: 56
End: 2021-02-10

## 2021-02-10 NOTE — TELEPHONE ENCOUNTER
"Case Management/ Note    Patient Name: Xin Nj  YOB: 1965  MRN #: [id number]    OSW called patient who is alert and oriented to person, place and time. She is pleasant. She said she is doing well today. She said she missed her appointment due to her \"nerves\" and being up all night. She said she was too sleepy to come in and has tried to call to reschedule. She has no noted concerns at this time. Offered to get a  for her and placed her on hold; a  was not available. She requested she be given a call back. OSW will remain available.     Electronically signed by:   Eladia Laguna LCSW, OSW-C  02/10/21, 11:07 EST        "

## 2021-02-12 ENCOUNTER — TELEPHONE (OUTPATIENT)
Dept: ONCOLOGY | Facility: HOSPITAL | Age: 56
End: 2021-02-12

## 2021-02-12 NOTE — TELEPHONE ENCOUNTER
Patient called about chemo appt and missing our calls.  I gave her an appt for Tuesday the 16th at 8am.  She did verify understanding of appt time.

## 2021-02-15 ENCOUNTER — TELEPHONE (OUTPATIENT)
Dept: ONCOLOGY | Facility: HOSPITAL | Age: 56
End: 2021-02-15

## 2021-02-15 NOTE — TELEPHONE ENCOUNTER
Called patient to reschedule appointment on 2/16/21 to 2/17/21 due to possible weather conditions.  No answer.  Message left for return call.

## 2021-02-16 ENCOUNTER — TELEPHONE (OUTPATIENT)
Dept: ONCOLOGY | Facility: HOSPITAL | Age: 56
End: 2021-02-16

## 2021-02-16 ENCOUNTER — HOSPITAL ENCOUNTER (OUTPATIENT)
Dept: ONCOLOGY | Facility: HOSPITAL | Age: 56
Setting detail: INFUSION SERIES
Discharge: HOME | End: 2021-02-16

## 2021-02-16 NOTE — TELEPHONE ENCOUNTER
Pt called to reschedule the chemo at d/t weather. She asked to reschedule for next Tuesday at 10am. She v/u

## 2021-02-16 NOTE — TELEPHONE ENCOUNTER
Patient scheduled for chemotherapy at 1000.  Called to see if patient plans to keep appointment.  No answer.  Message left for return call.

## 2021-02-17 ENCOUNTER — TELEPHONE (OUTPATIENT)
Dept: ONCOLOGY | Facility: CLINIC | Age: 56
End: 2021-02-17

## 2021-02-17 NOTE — TELEPHONE ENCOUNTER
PATIENT WAS HARIKA FOR FOLLOW-UP WITH [facility] ON 03/23. PATIENT STATED SHE HAVE CHEMO ON 02-23 AND DIDN'T KNOW IF SHE NEED A [facility] APPT IF SO CAN WE R/S THE APPT ON 03/23 TO [facility]. I PUT PATIENT ON [facility] NEXT AVAILABLE APPT

## 2021-02-23 ENCOUNTER — HOSPITAL ENCOUNTER (OUTPATIENT)
Dept: ONCOLOGY | Facility: HOSPITAL | Age: 56
Setting detail: INFUSION SERIES
Discharge: HOME | End: 2021-02-23

## 2021-02-23 ENCOUNTER — OFFICE VISIT (OUTPATIENT)
Dept: ONCOLOGY | Facility: CLINIC | Age: 56
End: 2021-02-23

## 2021-02-23 VITALS
DIASTOLIC BLOOD PRESSURE: 85 MMHG | WEIGHT: 215 LBS | TEMPERATURE: 97.8 F | HEIGHT: 68 IN | SYSTOLIC BLOOD PRESSURE: 123 MMHG | HEART RATE: 70 BPM | RESPIRATION RATE: 18 BRPM | BODY MASS INDEX: 32.58 KG/M2

## 2021-02-23 VITALS
BODY MASS INDEX: 32.66 KG/M2 | TEMPERATURE: 97.6 F | RESPIRATION RATE: 18 BRPM | DIASTOLIC BLOOD PRESSURE: 85 MMHG | HEART RATE: 70 BPM | WEIGHT: 215.5 LBS | HEIGHT: 68 IN | SYSTOLIC BLOOD PRESSURE: 123 MMHG

## 2021-02-23 DIAGNOSIS — F41.9 ANXIETY: ICD-10-CM

## 2021-02-23 DIAGNOSIS — C54.1 MALIGNANT NEOPLASM OF ENDOMETRIUM (HCC): Primary | ICD-10-CM

## 2021-02-23 DIAGNOSIS — T45.1X5A CHEMOTHERAPY INDUCED NEUTROPENIA (HCC): ICD-10-CM

## 2021-02-23 DIAGNOSIS — D70.1 CHEMOTHERAPY INDUCED NEUTROPENIA (HCC): ICD-10-CM

## 2021-02-23 DIAGNOSIS — F32.A DEPRESSION, UNSPECIFIED DEPRESSION TYPE: ICD-10-CM

## 2021-02-23 LAB
ALBUMIN SERPL-MCNC: 3.7 G/DL (ref 3.5–5.2)
ALBUMIN/GLOB SERPL: 1.3 G/DL
ALP SERPL-CCNC: 81 U/L (ref 39–117)
ALT SERPL W P-5'-P-CCNC: 14 U/L (ref 1–33)
ANION GAP SERPL CALCULATED.3IONS-SCNC: 8 MMOL/L (ref 5–15)
AST SERPL-CCNC: 16 U/L (ref 1–32)
BASOPHILS # BLD AUTO: 0.04 10*3/MM3 (ref 0–0.2)
BASOPHILS NFR BLD AUTO: 0.6 % (ref 0–1.5)
BILIRUB SERPL-MCNC: 0.2 MG/DL (ref 0–1.2)
BUN SERPL-MCNC: 14 MG/DL (ref 6–20)
BUN/CREAT SERPL: 17.3 (ref 7–25)
CALCIUM SPEC-SCNC: 8.9 MG/DL (ref 8.6–10.5)
CHLORIDE SERPL-SCNC: 107 MMOL/L (ref 98–107)
CO2 SERPL-SCNC: 26 MMOL/L (ref 22–29)
CREAT BLDA-MCNC: 0.7 MG/DL (ref 0.6–1.3)
CREAT SERPL-MCNC: 0.81 MG/DL (ref 0.57–1)
DEPRECATED RDW RBC AUTO: 48.3 FL (ref 37–54)
EOSINOPHIL # BLD AUTO: 0.16 10*3/MM3 (ref 0–0.4)
EOSINOPHIL NFR BLD AUTO: 2.5 % (ref 0.3–6.2)
ERYTHROCYTE [DISTWIDTH] IN BLOOD BY AUTOMATED COUNT: 14.1 % (ref 12.3–15.4)
GFR SERPL CREATININE-BSD FRML MDRD: 73 ML/MIN/1.73
GLOBULIN UR ELPH-MCNC: 2.9 GM/DL
GLUCOSE SERPL-MCNC: 90 MG/DL (ref 65–99)
HCT VFR BLD AUTO: 42.9 % (ref 34–46.6)
HGB BLD-MCNC: 13.6 G/DL (ref 12–15.9)
LYMPHOCYTES # BLD AUTO: 1.48 10*3/MM3 (ref 0.7–3.1)
LYMPHOCYTES NFR BLD AUTO: 23.4 % (ref 19.6–45.3)
MCH RBC QN AUTO: 30.4 PG (ref 26.6–33)
MCHC RBC AUTO-ENTMCNC: 31.7 G/DL (ref 31.5–35.7)
MCV RBC AUTO: 95.8 FL (ref 79–97)
MONOCYTES # BLD AUTO: 0.43 10*3/MM3 (ref 0.1–0.9)
MONOCYTES NFR BLD AUTO: 6.8 % (ref 5–12)
NEUTROPHILS NFR BLD AUTO: 4.22 10*3/MM3 (ref 1.7–7)
NEUTROPHILS NFR BLD AUTO: 66.7 % (ref 42.7–76)
PLATELET # BLD AUTO: 208 10*3/MM3 (ref 140–450)
PMV BLD AUTO: 9.9 FL (ref 6–12)
POTASSIUM SERPL-SCNC: 3.8 MMOL/L (ref 3.5–5.2)
PROT SERPL-MCNC: 6.6 G/DL (ref 6–8.5)
RBC # BLD AUTO: 4.48 10*6/MM3 (ref 3.77–5.28)
SODIUM SERPL-SCNC: 141 MMOL/L (ref 136–145)
WBC # BLD AUTO: 6.33 10*3/MM3 (ref 3.4–10.8)

## 2021-02-23 PROCEDURE — 25010000002 FOSAPREPITANT PER 1 MG: Performed by: INTERNAL MEDICINE

## 2021-02-23 PROCEDURE — 25010000002 LORAZEPAM PER 2 MG: Performed by: INTERNAL MEDICINE

## 2021-02-23 PROCEDURE — 25010000002 DEXAMETHASONE SODIUM PHOSPHATE 120 MG/30ML SOLUTION: Performed by: INTERNAL MEDICINE

## 2021-02-23 PROCEDURE — 99215 OFFICE O/P EST HI 40 MIN: CPT | Performed by: INTERNAL MEDICINE

## 2021-02-23 PROCEDURE — 96367 TX/PROPH/DG ADDL SEQ IV INF: CPT

## 2021-02-23 PROCEDURE — 96411 CHEMO IV PUSH ADDL DRUG: CPT

## 2021-02-23 PROCEDURE — 25010000002 DIPHENHYDRAMINE PER 50 MG: Performed by: INTERNAL MEDICINE

## 2021-02-23 PROCEDURE — 96417 CHEMO IV INFUS EACH ADDL SEQ: CPT

## 2021-02-23 PROCEDURE — 25010000002 CARBOPLATIN PER 50 MG: Performed by: INTERNAL MEDICINE

## 2021-02-23 PROCEDURE — 25010000002 PALONOSETRON 0.25 MG/5ML SOLUTION PREFILLED SYRINGE: Performed by: INTERNAL MEDICINE

## 2021-02-23 PROCEDURE — 96413 CHEMO IV INFUSION 1 HR: CPT

## 2021-02-23 PROCEDURE — 85025 COMPLETE CBC W/AUTO DIFF WBC: CPT | Performed by: INTERNAL MEDICINE

## 2021-02-23 PROCEDURE — 96415 CHEMO IV INFUSION ADDL HR: CPT

## 2021-02-23 PROCEDURE — 80053 COMPREHEN METABOLIC PANEL: CPT | Performed by: INTERNAL MEDICINE

## 2021-02-23 PROCEDURE — 96375 TX/PRO/DX INJ NEW DRUG ADDON: CPT

## 2021-02-23 PROCEDURE — 82565 ASSAY OF CREATININE: CPT

## 2021-02-23 PROCEDURE — 25010000003 HEPARIN LOCK FLUSH PER 10 UNITS: Performed by: INTERNAL MEDICINE

## 2021-02-23 PROCEDURE — 25010000002 PACLITAXEL PER 1 MG: Performed by: INTERNAL MEDICINE

## 2021-02-23 PROCEDURE — 36591 DRAW BLOOD OFF VENOUS DEVICE: CPT

## 2021-02-23 RX ORDER — SODIUM CHLORIDE 0.9 % (FLUSH) 0.9 %
10 SYRINGE (ML) INJECTION AS NEEDED
Status: CANCELLED | OUTPATIENT
Start: 2021-02-23

## 2021-02-23 RX ORDER — ACETAMINOPHEN 325 MG/1
650 TABLET ORAL ONCE
Status: COMPLETED | OUTPATIENT
Start: 2021-02-23 | End: 2021-02-23

## 2021-02-23 RX ORDER — FAMOTIDINE 10 MG/ML
20 INJECTION, SOLUTION INTRAVENOUS ONCE
Status: COMPLETED | OUTPATIENT
Start: 2021-02-23 | End: 2021-02-23

## 2021-02-23 RX ORDER — SODIUM CHLORIDE 0.9 % (FLUSH) 0.9 %
10 SYRINGE (ML) INJECTION AS NEEDED
Status: DISCONTINUED | OUTPATIENT
Start: 2021-02-23 | End: 2021-02-24 | Stop reason: HOSPADM

## 2021-02-23 RX ORDER — HEPARIN SODIUM (PORCINE) LOCK FLUSH IV SOLN 100 UNIT/ML 100 UNIT/ML
500 SOLUTION INTRAVENOUS AS NEEDED
Status: DISCONTINUED | OUTPATIENT
Start: 2021-02-23 | End: 2021-02-24 | Stop reason: HOSPADM

## 2021-02-23 RX ORDER — PALONOSETRON 0.05 MG/ML
0.25 INJECTION, SOLUTION INTRAVENOUS ONCE
Status: COMPLETED | OUTPATIENT
Start: 2021-02-23 | End: 2021-02-23

## 2021-02-23 RX ORDER — HEPARIN SODIUM (PORCINE) LOCK FLUSH IV SOLN 100 UNIT/ML 100 UNIT/ML
500 SOLUTION INTRAVENOUS AS NEEDED
Status: CANCELLED | OUTPATIENT
Start: 2021-02-23

## 2021-02-23 RX ORDER — SODIUM CHLORIDE 9 MG/ML
250 INJECTION, SOLUTION INTRAVENOUS ONCE
Status: COMPLETED | OUTPATIENT
Start: 2021-02-23 | End: 2021-02-23

## 2021-02-23 RX ORDER — DIPHENHYDRAMINE HYDROCHLORIDE 50 MG/ML
50 INJECTION INTRAMUSCULAR; INTRAVENOUS AS NEEDED
Status: CANCELLED | OUTPATIENT
Start: 2021-02-23

## 2021-02-23 RX ORDER — LORAZEPAM 2 MG/ML
0.25 INJECTION INTRAMUSCULAR ONCE
Status: COMPLETED | OUTPATIENT
Start: 2021-02-23 | End: 2021-02-23

## 2021-02-23 RX ORDER — FAMOTIDINE 10 MG/ML
20 INJECTION, SOLUTION INTRAVENOUS AS NEEDED
Status: CANCELLED | OUTPATIENT
Start: 2021-02-23

## 2021-02-23 RX ADMIN — PALONOSETRON 0.25 MG: 0.25 INJECTION, SOLUTION INTRAVENOUS at 12:31

## 2021-02-23 RX ADMIN — HEPARIN 500 UNITS: 100 SYRINGE at 17:40

## 2021-02-23 RX ADMIN — ACETAMINOPHEN 650 MG: 325 TABLET ORAL at 13:54

## 2021-02-23 RX ADMIN — FAMOTIDINE 20 MG: 10 INJECTION, SOLUTION INTRAVENOUS at 12:28

## 2021-02-23 RX ADMIN — DEXAMETHASONE SODIUM PHOSPHATE 20 MG: 4 INJECTION, SOLUTION INTRA-ARTICULAR; INTRALESIONAL; INTRAMUSCULAR; INTRAVENOUS; SOFT TISSUE at 12:10

## 2021-02-23 RX ADMIN — SODIUM CHLORIDE 100 ML: 9 INJECTION, SOLUTION INTRAVENOUS at 12:48

## 2021-02-23 RX ADMIN — DIPHENHYDRAMINE HYDROCHLORIDE 50 MG: 50 INJECTION, SOLUTION INTRAMUSCULAR; INTRAVENOUS at 12:32

## 2021-02-23 RX ADMIN — CARBOPLATIN 900 MG: 10 INJECTION, SOLUTION INTRAVENOUS at 16:49

## 2021-02-23 RX ADMIN — PACLITAXEL 365 MG: 6 INJECTION, SOLUTION INTRAVENOUS at 13:26

## 2021-02-23 RX ADMIN — LORAZEPAM 0.25 MG: 2 INJECTION INTRAMUSCULAR; INTRAVENOUS at 13:54

## 2021-02-23 RX ADMIN — Medication 10 ML: at 17:40

## 2021-02-23 RX ADMIN — SODIUM CHLORIDE 250 ML: 9 INJECTION, SOLUTION INTRAVENOUS at 12:09

## 2021-02-23 NOTE — PROGRESS NOTES
Hematology/Oncology Outpatient Follow Up    PATIENT NAME:Xin Nj  :1965  MRN: [id number]  PRIMARY CARE PHYSICIAN: Park Dubose MD  REFERRING PHYSICIAN: Park Dubose,*    Chief Complaint   Patient presents with   • Follow-up     malignant neoplasm of uterus        HISTORY OF PRESENT ILLNESS:     This is a 55-year-old female who developed postmenopausal vaginal bleeding for approximately one and half years.  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  On 2020-10-31 patient had CT scan of the abdomen and pelvis with contrast there were no acute findings in the abdomen or pelvis.  Stable hypodense lesion in the liver   Patient was then referred to Dr. Jama with [facility] and on 11/10/2020 patient underwent   total robotic hysterectomy, bilateral salpingo-oophorectomy and sentinel lymphadenectomy.  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  There was no significant pathology identified in the right or left ovaries.  One left pelvic sentinel lymph node was benign for malignancy.  On the right pelvic  7 benign lymph nodes we are removed.  The mass measured 7 cm in size.  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  There was evidence of lymphovascular invasion.  Pathology stage is pT1bN0 FIGO 1B     Dr. Jama and team have recommended adjuvant chemotherapy with carboplatinum and Taxol sandwiched with external beam radiation and vaginal cuff brachytherapy.  Total of 6 cycles of chemotherapy with carboplatinum and Taxol has been recommended with 3 cycles prior to radiation and subsequent 3 cycles after radiation in a sandwich fashion.     Patient has a history of right breast cancer that was diagnosed in .  She underwent right lumpectomy followed by chemotherapy for 4 cycles.  Adjuvant  radiation was recommended but she declined radiation treatments.  Patient states that her tumor was infiltrated on her right chest wall and for that reason she aborted all future treatments.  She has been without any evidence of relapsed disease.  Patient still has her port from 2003 which has not been flushed or cared for since then.     She is accompanied today by 2 of her daughters one by phone for this visit      · 2/23/2021 patient was initiated on adjuvant chemotherapy with carboplatinum and Taxol  Past Medical History:   Diagnosis Date   • Anxiety    • COPD (chronic obstructive pulmonary disease) (CMS/HCC)    • COPD (chronic obstructive pulmonary disease) (CMS/HCC)    • Depression    • Encounter for antineoplastic radiation therapy    • History of right breast cancer    • Osteoporosis        Past Surgical History:   Procedure Laterality Date   • BREAST LUMPECTOMY Right    • HYSTERECTOMY  2020         Current Outpatient Medications:   •  albuterol sulfate  (90 Base) MCG/ACT inhaler, inhale 1 PUFF every 4 HOURS as needed, Disp: , Rfl:   •  ALPRAZolam XR 0.5 MG 24 hr tablet, TAKE ONE TABLET BY MOUTH EVERY MORNING AS NEEDED, Disp: , Rfl:   •  budesonide-formoterol (SYMBICORT) 160-4.5 MCG/ACT inhaler, Inhale 2 puffs 2 (Two) Times a Day., Disp: , Rfl:   •  cholecalciferol (VITAMIN D3) 25 MCG (1000 UT) tablet, Take 1,000 Units by mouth Daily., Disp: , Rfl:   •  cloNIDine (CATAPRES) 0.2 MG tablet, clonidine HCl 0.2 mg tablet, Disp: , Rfl:   •  dexamethasone (DECADRON) 4 MG tablet, Take 2 tablets in the morning daily on days 2, 3 & 4.  Take with food., Disp: 6 tablet, Rfl: 5  •  diclofenac sodium (VOTAREN XR) 100 MG 24 hr tablet, diclofenac  mg tablet,extended release 24 hr, Disp: , Rfl:   •  gabapentin (NEURONTIN) 600 MG tablet, Take 600 mg by mouth 3 (Three) Times a Day., Disp: , Rfl:   •  HYDROcodone-acetaminophen (NORCO) 7.5-325 MG per tablet, Take 1 tablet by mouth Every 8 (Eight) Hours As Needed.,  Disp: , Rfl:   •  lidocaine-prilocaine (EMLA) 2.5-2.5 % cream, Apply  topically to the appropriate area as directed As Needed (prior to accessing port)., Disp: 30 g, Rfl: 2  •  ondansetron (ZOFRAN) 8 MG tablet, Take 1 tablet by mouth 3 (Three) Times a Day As Needed for Nausea or Vomiting., Disp: 30 tablet, Rfl: 5  No current facility-administered medications for this visit.     Facility-Administered Medications Ordered in Other Visits:   •  heparin injection 500 Units, 500 Units, Intravenous, PRN, Juliana Neal MD  •  pegfilgrastim (NEULASTA ONPRO) on-body injector 6 mg, 6 mg, Subcutaneous, Once, Juliana Neal MD  •  sodium chloride 0.9 % flush 10 mL, 10 mL, Intravenous, PRN, Juliana Neal MD    No Known Allergies    Family History   Problem Relation Age of Onset   • Stroke Mother    • Lung cancer Brother        Cancer-related family history includes Lung cancer in her brother.    Social History     Tobacco Use   • Smoking status: Current Every Day Smoker   • Tobacco comment: down to 1 ppd   Substance Use Topics   • Alcohol use: Not Currently   • Drug use: Never       HPI, ROS and PFSH have been reviewed and confirmed on 2/23/2021.     SUBJECTIVE:    She is here today for her first cycle of chemotherapy        REVIEW OF SYSTEMS:  Review of Systems   Constitutional: Negative for chills and fever.   HENT: Negative for ear pain, mouth sores, nosebleeds and sore throat.    Eyes: Negative for photophobia and visual disturbance.   Respiratory: Negative for wheezing and stridor.    Cardiovascular: Negative for chest pain and palpitations.   Gastrointestinal: Negative for abdominal pain, diarrhea, nausea and vomiting.   Endocrine: Negative for cold intolerance and heat intolerance.   Genitourinary: Negative for dysuria and hematuria.   Musculoskeletal: Negative for joint swelling and neck stiffness.   Skin: Negative for color change and rash.   Neurological: Negative for seizures and syncope.  "  Hematological: Negative for adenopathy.        No obvious bleeding   Psychiatric/Behavioral: Negative for agitation, confusion and hallucinations.       OBJECTIVE:    Vitals:    02/23/21 1538   BP: 123/85   Pulse: 70   Resp: 18   Temp: 97.8 °F (36.6 °C)   TempSrc: Temporal   Weight: 97.5 kg (215 lb)   Height: 172.7 cm (68\")   PainSc:   8   PainLoc: Back     Body mass index is 32.69 kg/m².    ECOG  (0) Fully active, able to carry on all predisease performance without restriction    Physical Exam  Vitals signs and nursing note reviewed.   Constitutional:       General: She is not in acute distress.     Appearance: She is not diaphoretic.   HENT:      Head: Normocephalic and atraumatic.   Eyes:      General: No scleral icterus.        Right eye: No discharge.         Left eye: No discharge.      Conjunctiva/sclera: Conjunctivae normal.   Neck:      Musculoskeletal: Normal range of motion and neck supple.      Thyroid: No thyromegaly.   Cardiovascular:      Rate and Rhythm: Normal rate and regular rhythm.      Heart sounds: Normal heart sounds. No friction rub. No gallop.    Pulmonary:      Effort: Pulmonary effort is normal. No respiratory distress.      Breath sounds: No stridor. No wheezing.   Abdominal:      General: Bowel sounds are normal.      Palpations: Abdomen is soft. There is no mass.      Tenderness: There is no abdominal tenderness. There is no guarding or rebound.   Musculoskeletal: Normal range of motion.         General: No tenderness.   Lymphadenopathy:      Cervical: No cervical adenopathy.   Skin:     General: Skin is warm.      Findings: No erythema or rash.   Neurological:      Mental Status: She is alert and oriented to person, place, and time.      Motor: No abnormal muscle tone.   Psychiatric:         Behavior: Behavior normal.     I have reexamined the patient and the results are consistent with the previously documented exam. Juliana Neal MD     RECENT LABS  WBC   Date Value Ref " Range Status   02/23/2021 6.33 3.40 - 10.80 10*3/mm3 Final     RBC   Date Value Ref Range Status   02/23/2021 4.48 3.77 - 5.28 10*6/mm3 Final     Hemoglobin   Date Value Ref Range Status   02/23/2021 13.6 12.0 - 15.9 g/dL Final     Hematocrit   Date Value Ref Range Status   02/23/2021 42.9 34.0 - 46.6 % Final     MCV   Date Value Ref Range Status   02/23/2021 95.8 79.0 - 97.0 fL Final     MCH   Date Value Ref Range Status   02/23/2021 30.4 26.6 - 33.0 pg Final     MCHC   Date Value Ref Range Status   02/23/2021 31.7 31.5 - 35.7 g/dL Final     RDW   Date Value Ref Range Status   02/23/2021 14.1 12.3 - 15.4 % Final     RDW-SD   Date Value Ref Range Status   02/23/2021 48.3 37.0 - 54.0 fl Final     MPV   Date Value Ref Range Status   02/23/2021 9.9 6.0 - 12.0 fL Final     Platelets   Date Value Ref Range Status   02/23/2021 208 140 - 450 10*3/mm3 Final     Neutrophil %   Date Value Ref Range Status   02/23/2021 66.7 42.7 - 76.0 % Final     Lymphocyte %   Date Value Ref Range Status   02/23/2021 23.4 19.6 - 45.3 % Final     Monocyte %   Date Value Ref Range Status   02/23/2021 6.8 5.0 - 12.0 % Final     Eosinophil %   Date Value Ref Range Status   02/23/2021 2.5 0.3 - 6.2 % Final     Basophil %   Date Value Ref Range Status   02/23/2021 0.6 0.0 - 1.5 % Final     Neutrophils, Absolute   Date Value Ref Range Status   02/23/2021 4.22 1.70 - 7.00 10*3/mm3 Final     Lymphocytes, Absolute   Date Value Ref Range Status   02/23/2021 1.48 0.70 - 3.10 10*3/mm3 Final     Monocytes, Absolute   Date Value Ref Range Status   02/23/2021 0.43 0.10 - 0.90 10*3/mm3 Final     Eosinophils, Absolute   Date Value Ref Range Status   02/23/2021 0.16 0.00 - 0.40 10*3/mm3 Final     Basophils, Absolute   Date Value Ref Range Status   02/23/2021 0.04 0.00 - 0.20 10*3/mm3 Final     nRBC   Date Value Ref Range Status   02/04/2021 0.0 0.0 - 0.2 /100 WBC Final       Lab Results   Component Value Date    GLUCOSE 90 02/23/2021    BUN 14 02/23/2021     CREATININE 0.70 02/23/2021    EGFRIFNONA 73 02/23/2021    BCR 17.3 02/23/2021    K 3.8 02/23/2021    CO2 26.0 02/23/2021    CALCIUM 8.9 02/23/2021    ALBUMIN 3.70 02/23/2021    AST 16 02/23/2021    ALT 14 02/23/2021         Assessment/Plan     There are no diagnoses linked to this encounter.    ASSESSMENT:       Malignant neoplasm of uterus, unspecified site (CMS/HCC)  - CBC & Differential  - Comprehensive Metabolic Panel  -   - IR Port Placement  - Ambulatory Referral to ONC Social Work  - MRSA Screen Culture (Outpatient) - Swab, Nares  - IR Removal Tunnel CV With Port Different Site     Malignant neoplasm of endometrium (CMS/HCC)   -   - IR Port Placement  - Ambulatory Referral to ONC Social Work     Depression, unspecified depression type  - IR Port Placement  - Ambulatory Referral to ONC Social Work     Anxiety  - IR Port Placement  - Ambulatory Referral to ONC Social Work        1. 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 Adjuvant chemotherapy and radiation has been recommended by Dr. Jama with combination of carboplatinum and Taxol with external beam radiation and vaginal brachytherapy in sandwiched fashion.  3. Adjuvant chemotherapy with carboplatinum and Taxol initiated today  4. Chronic pain and anxiety management per PCP Dr. Dubose: Patient will continue to follow-up with Dr. Dubose for pain management and anxiety management.  5. History of invasive right breast cancer, status post right lumpectomy followed by chemotherapy for 4 cycles.  Will call [facility] for those records  6. Left Mediport since 2003: Examined by interventional radiologist and port is functional.  Port was therefore not removed     Discussion     I have reviewed her imaging studies, path report and clinical history.  I have also fully examined patient.  She has completely resected carcinosarcoma of the endometrium.   She understand that she has a high risk malignancy with high risk for relapse without additional adjuvant treatments.  Patient will receive combination of carboplatinum and Taxol every 21 days for 3 cycles followed by WPRT and brachytherapy, followed by additional 3 cycles of chemotherapy in a sandwich fashion.  Patient has an appointment to see Dr. Glover this afternoon.  I reviewed the side effects of chemotherapy with carboplatinum and Taxol with her to include but not limited to:     Chemotherapy side effects include, but not limited to, nausea, vomiting, bone marrow suppression, which can result in blood, platelet transfusion. There is also risk of permanent bone marrow destruction, which can cause myelodysplastic syndrome or leukemia years down the line. There is risk of infection which can result in hospitalization and even death. There is also risk of fatigue, asthenia, alopecia which could become permanent. Chemo will help to reduce risk of relapse of cancer, but does not eliminate risk completely.     I also discussed that specifically Taxol chemotherapy can lead to hypersensitivity reaction, peripheral neuropathy, there is also a risk of permanent alopecia less than 1%, fluid retention, myalgias.     Carboplatinum can also lead to significant thrombocytopenia which may require platelet transfusions     Patient has probable nonfunctioning port on the left chest wall, I have recommended removal and insertion of a new port for chemotherapy                    Plans:     · Continue with chemotherapy  · Continue supportive care medications  · EMLA cream  · Reviewed her labs today  · Counseling will be initiated by our   · She will follow up with me next week  · All questions answered.        Thank you very much for allowing me to participate in the care of Leidy, I will keep you updated on her progress        I spent 40 total minutes, face-to-face, caring for Xin today.  80% of this time involved  counseling and/or coordination of care as documented within this note.

## 2021-02-23 NOTE — PROGRESS NOTES
Pt here for C1D1 taxol, carboplatin. Port accessed for blood sampling. 10ml blood wasted prior to blood for labs being collected. Pt c/o lower back pain and feeling anxious. Let Dr Neal know about pt's complaints, and order received for ativan 0.25mg IV and tylenol 650mg po x1 dose today.

## 2021-02-25 ENCOUNTER — TELEPHONE (OUTPATIENT)
Dept: ONCOLOGY | Facility: HOSPITAL | Age: 56
End: 2021-02-25

## 2021-02-25 ENCOUNTER — TELEPHONE (OUTPATIENT)
Dept: ONCOLOGY | Facility: CLINIC | Age: 56
End: 2021-02-25

## 2021-02-25 NOTE — TELEPHONE ENCOUNTER
Patient's MD appt changed to Friday 3-5-2021 at 9:00AM, per Dr. Neal's request.  Attempted to contact patient.  LMV informing patient of MD appt change.  Instructed her to call back if there were any questions or concerns.

## 2021-02-25 NOTE — TELEPHONE ENCOUNTER
Patient called in stating she had missed call from here today, advised patient Lora called her and changed her MD appt, gave patient new appt time and date patient v/u

## 2021-02-26 ENCOUNTER — TELEPHONE (OUTPATIENT)
Dept: ONCOLOGY | Facility: HOSPITAL | Age: 56
End: 2021-02-26

## 2021-02-26 NOTE — TELEPHONE ENCOUNTER
Patient called in stating that she is having leg, foot pain and numbness that started 2/25/21 along with burning in her stomach, advised patient she can take her gabapentin and her hydrocodone and take something OTC for burning in her stomach since Dr. Neal is not here today or she can call her PCP to see if they will send something in for her stomach, Jyoti ROSE RN sent over the notes to the patient's PCP that she was requesting to be faxed. )

## 2021-02-28 ENCOUNTER — TELEPHONE (OUTPATIENT)
Dept: ONCOLOGY | Facility: HOSPITAL | Age: 56
End: 2021-02-28

## 2021-02-28 NOTE — TELEPHONE ENCOUNTER
Pt called c/o both legs hurting and having numbness since having chemo treatment. She reports her PCP just increased her gabapentin to 800mg tid and that it's not helping. Told her maybe gabapentin dosage increase hasn't had enough time to kick in yet. Told pt that she could take prn pain medicine as prescribed by PCP and to contact him for any additional pain meds. Informed pt that she could also try claritin daily to help with possible bone pain.

## 2021-03-05 ENCOUNTER — LAB (OUTPATIENT)
Dept: LAB | Facility: HOSPITAL | Age: 56
End: 2021-03-05

## 2021-03-05 ENCOUNTER — OFFICE VISIT (OUTPATIENT)
Dept: ONCOLOGY | Facility: CLINIC | Age: 56
End: 2021-03-05

## 2021-03-05 ENCOUNTER — TELEPHONE (OUTPATIENT)
Dept: ONCOLOGY | Facility: CLINIC | Age: 56
End: 2021-03-05

## 2021-03-05 ENCOUNTER — APPOINTMENT (OUTPATIENT)
Dept: LAB | Facility: HOSPITAL | Age: 56
End: 2021-03-05

## 2021-03-05 VITALS
SYSTOLIC BLOOD PRESSURE: 136 MMHG | BODY MASS INDEX: 32.89 KG/M2 | TEMPERATURE: 97.1 F | RESPIRATION RATE: 20 BRPM | DIASTOLIC BLOOD PRESSURE: 94 MMHG | WEIGHT: 217 LBS | HEART RATE: 71 BPM | HEIGHT: 68 IN

## 2021-03-05 DIAGNOSIS — T45.1X5A CHEMOTHERAPY-INDUCED NEUROPATHY (HCC): ICD-10-CM

## 2021-03-05 DIAGNOSIS — G62.0 CHEMOTHERAPY-INDUCED NEUROPATHY (HCC): ICD-10-CM

## 2021-03-05 DIAGNOSIS — C55 MALIGNANT NEOPLASM OF UTERUS, UNSPECIFIED SITE (HCC): Primary | ICD-10-CM

## 2021-03-05 DIAGNOSIS — C55 MALIGNANT NEOPLASM OF UTERUS, UNSPECIFIED SITE (HCC): ICD-10-CM

## 2021-03-05 LAB
BASOPHILS # BLD AUTO: 0.01 10*3/MM3 (ref 0–0.2)
BASOPHILS NFR BLD AUTO: 0.3 % (ref 0–1.5)
DEPRECATED RDW RBC AUTO: 45.4 FL (ref 37–54)
EOSINOPHIL # BLD AUTO: 0.01 10*3/MM3 (ref 0–0.4)
EOSINOPHIL NFR BLD AUTO: 0.3 % (ref 0.3–6.2)
ERYTHROCYTE [DISTWIDTH] IN BLOOD BY AUTOMATED COUNT: 13.2 % (ref 12.3–15.4)
HCT VFR BLD AUTO: 39.7 % (ref 34–46.6)
HGB BLD-MCNC: 12.6 G/DL (ref 12–15.9)
LYMPHOCYTES # BLD AUTO: 1.49 10*3/MM3 (ref 0.7–3.1)
LYMPHOCYTES NFR BLD AUTO: 51.4 % (ref 19.6–45.3)
MCH RBC QN AUTO: 30.7 PG (ref 26.6–33)
MCHC RBC AUTO-ENTMCNC: 31.7 G/DL (ref 31.5–35.7)
MCV RBC AUTO: 96.8 FL (ref 79–97)
MONOCYTES # BLD AUTO: 0.31 10*3/MM3 (ref 0.1–0.9)
MONOCYTES NFR BLD AUTO: 10.7 % (ref 5–12)
NEUTROPHILS NFR BLD AUTO: 1.08 10*3/MM3 (ref 1.7–7)
NEUTROPHILS NFR BLD AUTO: 37.3 % (ref 42.7–76)
PLATELET # BLD AUTO: 83 10*3/MM3 (ref 140–450)
PMV BLD AUTO: 9.4 FL (ref 6–12)
RBC # BLD AUTO: 4.1 10*6/MM3 (ref 3.77–5.28)
WBC # BLD AUTO: 2.9 10*3/MM3 (ref 3.4–10.8)

## 2021-03-05 PROCEDURE — 99215 OFFICE O/P EST HI 40 MIN: CPT | Performed by: INTERNAL MEDICINE

## 2021-03-05 PROCEDURE — 85025 COMPLETE CBC W/AUTO DIFF WBC: CPT

## 2021-03-05 PROCEDURE — 36415 COLL VENOUS BLD VENIPUNCTURE: CPT

## 2021-03-05 RX ORDER — OMEPRAZOLE 40 MG/1
40 CAPSULE, DELAYED RELEASE ORAL DAILY
Qty: 30 CAPSULE | Refills: 6 | Status: SHIPPED | OUTPATIENT
Start: 2021-03-05 | End: 2021-11-12

## 2021-03-05 RX ORDER — PROMETHAZINE HYDROCHLORIDE 12.5 MG/1
12.5 TABLET ORAL EVERY 12 HOURS PRN
COMMUNITY
Start: 2021-02-26 | End: 2021-11-12

## 2021-03-05 RX ORDER — TIZANIDINE HYDROCHLORIDE 4 MG/1
4 CAPSULE, GELATIN COATED ORAL 3 TIMES DAILY PRN
COMMUNITY
Start: 2021-02-17 | End: 2021-11-12

## 2021-03-05 RX ORDER — DULOXETIN HYDROCHLORIDE 60 MG/1
60 CAPSULE, DELAYED RELEASE ORAL DAILY
Status: ON HOLD | COMMUNITY
Start: 2021-02-26 | End: 2022-06-07

## 2021-03-05 RX ORDER — MULTIVITAMIN WITH IRON
100 TABLET ORAL 2 TIMES DAILY
Qty: 60 TABLET | Refills: 5 | Status: SHIPPED | OUTPATIENT
Start: 2021-03-05 | End: 2021-11-12

## 2021-03-05 RX ORDER — AMOXICILLIN 500 MG/1
TABLET, FILM COATED ORAL
COMMUNITY
Start: 2021-03-01 | End: 2021-03-31

## 2021-03-05 RX ORDER — GABAPENTIN 800 MG/1
800 TABLET ORAL 3 TIMES DAILY
Status: ON HOLD | COMMUNITY
Start: 2021-02-27 | End: 2022-06-07

## 2021-03-05 NOTE — TELEPHONE ENCOUNTER
S/W PT AND SHE CONFIRMED APPT DATE TIME AND LOCATION AFTER DR DICKEY WORKED HER BACK IN ON HER SCHEDULE TODAY

## 2021-03-05 NOTE — PROGRESS NOTES
Hematology/Oncology Outpatient Follow Up    PATIENT NAME:Xin Nj  :1965  MRN: [id number]  PRIMARY CARE PHYSICIAN: Park Dubose MD  REFERRING PHYSICIAN: Park Dubose,*    Chief Complaint   Patient presents with   • Follow-up     Malignant neoplasm of endometrium        HISTORY OF PRESENT ILLNESS:     This is a 55-year-old female who developed postmenopausal vaginal bleeding for approximately one and half years.  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  On 2020-10-31 patient had CT scan of the abdomen and pelvis with contrast there were no acute findings in the abdomen or pelvis.  Stable hypodense lesion in the liver   Patient was then referred to Dr. Jama with [facility] and on 11/10/2020 patient underwent   total robotic hysterectomy, bilateral salpingo-oophorectomy and sentinel lymphadenectomy.  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  There was no significant pathology identified in the right or left ovaries.  One left pelvic sentinel lymph node was benign for malignancy.  On the right pelvic  7 benign lymph nodes we are removed.  The mass measured 7 cm in size.  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  There was evidence of lymphovascular invasion.  Pathology stage is pT1bN0 FIGO 1B     Dr. Jama and team have recommended adjuvant chemotherapy with carboplatinum and Taxol sandwiched with external beam radiation and vaginal cuff brachytherapy.  Total of 6 cycles of chemotherapy with carboplatinum and Taxol has been recommended with 3 cycles prior to radiation and subsequent 3 cycles after radiation in a sandwich fashion.     Patient has a history of right breast cancer that was diagnosed in .  She underwent right lumpectomy followed by chemotherapy for 4 cycles.   Adjuvant radiation was recommended but she declined radiation treatments.  Patient states that her tumor was infiltrated on her right chest wall and for that reason she aborted all future treatments.  She has been without any evidence of relapsed disease.  Patient still has her port from 2003 which has not been flushed or cared for since then.     She is accompanied today by 2 of her daughters one by phone for this visit      · 2/23/2021 patient was initiated on adjuvant chemotherapy with carboplatinum and Taxol  ·   Past Medical History:   Diagnosis Date   • Anxiety    • COPD (chronic obstructive pulmonary disease) (CMS/HCC)    • COPD (chronic obstructive pulmonary disease) (CMS/HCC)    • Depression    • Encounter for antineoplastic radiation therapy    • History of right breast cancer    • Osteoporosis        Past Surgical History:   Procedure Laterality Date   • BREAST LUMPECTOMY Right    • HYSTERECTOMY  2020         Current Outpatient Medications:   •  albuterol sulfate  (90 Base) MCG/ACT inhaler, inhale 1 PUFF every 4 HOURS as needed, Disp: , Rfl:   •  ALPRAZolam XR 0.5 MG 24 hr tablet, TAKE ONE TABLET BY MOUTH EVERY MORNING AS NEEDED, Disp: , Rfl:   •  amoxicillin (AMOXIL) 500 MG tablet, , Disp: , Rfl:   •  budesonide-formoterol (SYMBICORT) 160-4.5 MCG/ACT inhaler, Inhale 2 puffs 2 (Two) Times a Day., Disp: , Rfl:   •  cholecalciferol (VITAMIN D3) 25 MCG (1000 UT) tablet, Take 1,000 Units by mouth Daily., Disp: , Rfl:   •  cloNIDine (CATAPRES) 0.2 MG tablet, clonidine HCl 0.2 mg tablet, Disp: , Rfl:   •  dexamethasone (DECADRON) 4 MG tablet, Take 2 tablets in the morning daily on days 2, 3 & 4.  Take with food., Disp: 6 tablet, Rfl: 5  •  diclofenac sodium (VOTAREN XR) 100 MG 24 hr tablet, diclofenac  mg tablet,extended release 24 hr, Disp: , Rfl:   •  DULoxetine (CYMBALTA) 60 MG capsule, Take 60 mg by mouth Daily., Disp: , Rfl:   •  gabapentin (NEURONTIN) 800 MG tablet, Take 800 mg by mouth 3  (Three) Times a Day., Disp: , Rfl:   •  HYDROcodone-acetaminophen (NORCO) 7.5-325 MG per tablet, Take 1 tablet by mouth Every 8 (Eight) Hours As Needed., Disp: , Rfl:   •  lidocaine-prilocaine (EMLA) 2.5-2.5 % cream, Apply  topically to the appropriate area as directed As Needed (prior to accessing port)., Disp: 30 g, Rfl: 2  •  ondansetron (ZOFRAN) 8 MG tablet, Take 1 tablet by mouth 3 (Three) Times a Day As Needed for Nausea or Vomiting., Disp: 30 tablet, Rfl: 5  •  promethazine (PHENERGAN) 12.5 MG tablet, Take 12.5 mg by mouth Every 12 (Twelve) Hours As Needed., Disp: , Rfl:   •  TiZANidine (ZANAFLEX) 4 MG capsule, Take 4 mg by mouth 3 (Three) Times a Day As Needed., Disp: , Rfl:   •  gabapentin (NEURONTIN) 600 MG tablet, Take 600 mg by mouth 3 (Three) Times a Day., Disp: , Rfl:   •  omeprazole (priLOSEC) 40 MG capsule, Take 1 capsule by mouth Daily., Disp: 30 capsule, Rfl: 6  •  vitamin B-6 (PYRIDOXINE) 100 MG tablet, Take 1 tablet by mouth 2 (two) times a day., Disp: 60 tablet, Rfl: 5    No Known Allergies    Family History   Problem Relation Age of Onset   • Stroke Mother    • Lung cancer Brother        Cancer-related family history includes Lung cancer in her brother.    Social History     Tobacco Use   • Smoking status: Current Every Day Smoker   • Tobacco comment: down to 1 ppd   Substance Use Topics   • Alcohol use: Not Currently   • Drug use: Never       HPI, ROS and PFSH have been reviewed and confirmed on 3/5/2021.     SUBJECTIVE:    She is here today for her first cycle of chemotherapy        REVIEW OF SYSTEMS:  Review of Systems   Constitutional: Negative for chills and fever.   HENT: Negative for ear pain, mouth sores, nosebleeds and sore throat.    Eyes: Negative for photophobia and visual disturbance.   Respiratory: Negative for wheezing and stridor.    Cardiovascular: Negative for chest pain and palpitations.   Gastrointestinal: Negative for abdominal pain, diarrhea, nausea and vomiting.  "  Endocrine: Negative for cold intolerance and heat intolerance.   Genitourinary: Negative for dysuria and hematuria.   Musculoskeletal: Negative for joint swelling and neck stiffness.   Skin: Negative for color change and rash.   Neurological: Negative for seizures and syncope.   Hematological: Negative for adenopathy.        No obvious bleeding   Psychiatric/Behavioral: Negative for agitation, confusion and hallucinations.       OBJECTIVE:    Vitals:    03/05/21 1300   BP: 136/94   Pulse: 71   Resp: 20   Temp: 97.1 °F (36.2 °C)   TempSrc: Temporal   Weight: 98.4 kg (217 lb)   Height: 172.7 cm (68\")   PainSc:   7   PainLoc: Comment: lower back, legs, hands are numb     Body mass index is 32.99 kg/m².    ECOG  (0) Fully active, able to carry on all predisease performance without restriction    Physical Exam  Vitals signs and nursing note reviewed.   Constitutional:       General: She is not in acute distress.     Appearance: She is not diaphoretic.   HENT:      Head: Normocephalic and atraumatic.   Eyes:      General: No scleral icterus.        Right eye: No discharge.         Left eye: No discharge.      Conjunctiva/sclera: Conjunctivae normal.   Neck:      Musculoskeletal: Normal range of motion and neck supple.      Thyroid: No thyromegaly.   Cardiovascular:      Rate and Rhythm: Normal rate and regular rhythm.      Heart sounds: Normal heart sounds. No friction rub. No gallop.    Pulmonary:      Effort: Pulmonary effort is normal. No respiratory distress.      Breath sounds: No stridor. No wheezing.   Abdominal:      General: Bowel sounds are normal.      Palpations: Abdomen is soft. There is no mass.      Tenderness: There is no abdominal tenderness. There is no guarding or rebound.   Musculoskeletal: Normal range of motion.         General: No tenderness.   Lymphadenopathy:      Cervical: No cervical adenopathy.   Skin:     General: Skin is warm.      Findings: No erythema or rash.   Neurological:      Mental " Status: She is alert and oriented to person, place, and time.      Motor: No abnormal muscle tone.   Psychiatric:         Behavior: Behavior normal.     I have reexamined the patient and the results are consistent with the previously documented exam. Juliana Neal MD     RECENT LABS  WBC   Date Value Ref Range Status   03/05/2021 2.90 (L) 3.40 - 10.80 10*3/mm3 Final     RBC   Date Value Ref Range Status   03/05/2021 4.10 3.77 - 5.28 10*6/mm3 Final     Hemoglobin   Date Value Ref Range Status   03/05/2021 12.6 12.0 - 15.9 g/dL Final     Hematocrit   Date Value Ref Range Status   03/05/2021 39.7 34.0 - 46.6 % Final     MCV   Date Value Ref Range Status   03/05/2021 96.8 79.0 - 97.0 fL Final     MCH   Date Value Ref Range Status   03/05/2021 30.7 26.6 - 33.0 pg Final     MCHC   Date Value Ref Range Status   03/05/2021 31.7 31.5 - 35.7 g/dL Final     RDW   Date Value Ref Range Status   03/05/2021 13.2 12.3 - 15.4 % Final     RDW-SD   Date Value Ref Range Status   03/05/2021 45.4 37.0 - 54.0 fl Final     MPV   Date Value Ref Range Status   03/05/2021 9.4 6.0 - 12.0 fL Final     Platelets   Date Value Ref Range Status   03/05/2021 83 (L) 140 - 450 10*3/mm3 Final     Neutrophil %   Date Value Ref Range Status   03/05/2021 37.3 (L) 42.7 - 76.0 % Final     Lymphocyte %   Date Value Ref Range Status   03/05/2021 51.4 (H) 19.6 - 45.3 % Final     Monocyte %   Date Value Ref Range Status   03/05/2021 10.7 5.0 - 12.0 % Final     Eosinophil %   Date Value Ref Range Status   03/05/2021 0.3 0.3 - 6.2 % Final     Basophil %   Date Value Ref Range Status   03/05/2021 0.3 0.0 - 1.5 % Final     Neutrophils, Absolute   Date Value Ref Range Status   03/05/2021 1.08 (L) 1.70 - 7.00 10*3/mm3 Final     Lymphocytes, Absolute   Date Value Ref Range Status   03/05/2021 1.49 0.70 - 3.10 10*3/mm3 Final     Monocytes, Absolute   Date Value Ref Range Status   03/05/2021 0.31 0.10 - 0.90 10*3/mm3 Final     Eosinophils, Absolute   Date Value  Ref Range Status   03/05/2021 0.01 0.00 - 0.40 10*3/mm3 Final     Basophils, Absolute   Date Value Ref Range Status   03/05/2021 0.01 0.00 - 0.20 10*3/mm3 Final     nRBC   Date Value Ref Range Status   02/04/2021 0.0 0.0 - 0.2 /100 WBC Final       Lab Results   Component Value Date    GLUCOSE 90 02/23/2021    BUN 14 02/23/2021    CREATININE 0.70 02/23/2021    EGFRIFNONA 73 02/23/2021    BCR 17.3 02/23/2021    K 3.8 02/23/2021    CO2 26.0 02/23/2021    CALCIUM 8.9 02/23/2021    ALBUMIN 3.70 02/23/2021    AST 16 02/23/2021    ALT 14 02/23/2021         Assessment/Plan     Malignant neoplasm of uterus, unspecified site (CMS/HCC)  - CBC & Differential  - omeprazole (priLOSEC) 40 MG capsule  - vitamin B-6 (PYRIDOXINE) 100 MG tablet    Chemotherapy-induced neuropathy (CMS/HCC)  - omeprazole (priLOSEC) 40 MG capsule  - vitamin B-6 (PYRIDOXINE) 100 MG tablet      ASSESSMENT:       Malignant neoplasm of uterus, unspecified site (CMS/HCC)  - CBC & Differential  - Comprehensive Metabolic Panel  -   - IR Port Placement  - Ambulatory Referral to ONC Social Work  - MRSA Screen Culture (Outpatient) - Swab, Nares  - IR Removal Tunnel CV With Port Different Site     Malignant neoplasm of endometrium (CMS/HCC)   -   - IR Port Placement  - Ambulatory Referral to ONC Social Work     Depression, unspecified depression type  - IR Port Placement  - Ambulatory Referral to ONC Social Work     Anxiety  - IR Port Placement  - Ambulatory Referral to ONC Social Work        1. 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 Adjuvant chemotherapy and radiation has been recommended by Dr. Jama with combination of carboplatinum and Taxol with external beam radiation and vaginal brachytherapy in sandwiched fashion.  3. Adjuvant chemotherapy with carboplatinum and Taxol initiated  4. Chemotherapy induced fatigue  5. Mild peripheral neuropathy grade  1  6. Dyspepsia  7. Chronic pain and anxiety management per PCP Dr. Dubose: Patient will continue to follow-up with Dr. Dubose for pain management and anxiety management.  8. History of invasive right breast cancer, status post right lumpectomy followed by chemotherapy for 4 cycles.  Will call [facility] for those records  9. Left Mediport since 2003: Examined by interventional radiologist and port is functional.  Port was therefore not removed     Discussion     I have reviewed her imaging studies, path report and clinical history.  I have also fully examined patient.  She has completely resected carcinosarcoma of the endometrium.  She understand that she has a high risk malignancy with high risk for relapse without additional adjuvant treatments.  Patient will receive combination of carboplatinum and Taxol every 21 days for 3 cycles followed by WPRT and brachytherapy, followed by additional 3 cycles of chemotherapy in a sandwich fashion.  Patient has an appointment to see Dr. Glover this afternoon.  I reviewed the side effects of chemotherapy with carboplatinum and Taxol with her to include but not limited to:     Chemotherapy side effects include, but not limited to, nausea, vomiting, bone marrow suppression, which can result in blood, platelet transfusion. There is also risk of permanent bone marrow destruction, which can cause myelodysplastic syndrome or leukemia years down the line. There is risk of infection which can result in hospitalization and even death. There is also risk of fatigue, asthenia, alopecia which could become permanent. Chemo will help to reduce risk of relapse of cancer, but does not eliminate risk completely.     I also discussed that specifically Taxol chemotherapy can lead to hypersensitivity reaction, peripheral neuropathy, there is also a risk of permanent alopecia less than 1%, fluid retention, myalgias.     Carboplatinum can also lead to significant thrombocytopenia  which may require platelet transfusions     Patient has probable nonfunctioning port on the left chest wall, I have recommended removal and insertion of a new port for chemotherapy                    Plans:     · Continue with chemotherapy  · Prilosec 40 mg p.o. daily  · Vitamin B6 100 mg p.o. twice a day for mild neuropathy  · Continue supportive care medications  · EMLA cream  · Reviewed her labs today  · She will follow up with me 3 weeks  · All questions answered.        Thank you very much for allowing me to participate in the care of Leidy, I will keep you updated on her progress        I spent 40 total minutes, face-to-face, caring for Xin today.  80% of this time involved counseling and/or coordination of care as documented within this note.

## 2021-03-11 DIAGNOSIS — C54.1 MALIGNANT NEOPLASM OF ENDOMETRIUM (HCC): Primary | ICD-10-CM

## 2021-03-11 RX ORDER — DIPHENHYDRAMINE HYDROCHLORIDE 50 MG/ML
50 INJECTION INTRAMUSCULAR; INTRAVENOUS AS NEEDED
Status: CANCELLED | OUTPATIENT
Start: 2021-03-18

## 2021-03-11 RX ORDER — PALONOSETRON 0.05 MG/ML
0.25 INJECTION, SOLUTION INTRAVENOUS ONCE
Status: CANCELLED | OUTPATIENT
Start: 2021-03-18

## 2021-03-11 RX ORDER — SODIUM CHLORIDE 9 MG/ML
250 INJECTION, SOLUTION INTRAVENOUS ONCE
Status: CANCELLED | OUTPATIENT
Start: 2021-03-18

## 2021-03-11 RX ORDER — FAMOTIDINE 10 MG/ML
20 INJECTION, SOLUTION INTRAVENOUS AS NEEDED
Status: CANCELLED | OUTPATIENT
Start: 2021-03-18

## 2021-03-11 RX ORDER — FAMOTIDINE 10 MG/ML
20 INJECTION, SOLUTION INTRAVENOUS ONCE
Status: CANCELLED | OUTPATIENT
Start: 2021-03-18

## 2021-03-16 ENCOUNTER — TELEPHONE (OUTPATIENT)
Dept: ONCOLOGY | Facility: CLINIC | Age: 56
End: 2021-03-16

## 2021-03-16 ENCOUNTER — HOSPITAL ENCOUNTER (OUTPATIENT)
Dept: ONCOLOGY | Facility: HOSPITAL | Age: 56
Setting detail: INFUSION SERIES
Discharge: HOME | End: 2021-03-16

## 2021-03-16 NOTE — TELEPHONE ENCOUNTER
PT'S DAUGHTER CALLED AND STATED THAT THEY HAD THOUGHT THE APPT FOR TODAY'S INFUSION WAS FOR 1230. SHE CONFIRMED NEW APPT DATE AND TIME FOR THIS INFUSION SINCE BY THE TIME THEY GOT HERE TODAY THE PT WOULD BE UNABLE TO RECEIVE ALL OF HER MEDICATIONS

## 2021-03-18 ENCOUNTER — HOSPITAL ENCOUNTER (OUTPATIENT)
Dept: ONCOLOGY | Facility: HOSPITAL | Age: 56
Setting detail: INFUSION SERIES
Discharge: HOME | End: 2021-03-18

## 2021-03-18 VITALS
SYSTOLIC BLOOD PRESSURE: 116 MMHG | HEART RATE: 89 BPM | HEIGHT: 68 IN | RESPIRATION RATE: 18 BRPM | DIASTOLIC BLOOD PRESSURE: 79 MMHG | BODY MASS INDEX: 32.87 KG/M2 | TEMPERATURE: 97.5 F | WEIGHT: 216.9 LBS

## 2021-03-18 DIAGNOSIS — C54.1 MALIGNANT NEOPLASM OF ENDOMETRIUM (HCC): Primary | ICD-10-CM

## 2021-03-18 DIAGNOSIS — D70.1 CHEMOTHERAPY INDUCED NEUTROPENIA (HCC): ICD-10-CM

## 2021-03-18 DIAGNOSIS — T45.1X5A CHEMOTHERAPY INDUCED NEUTROPENIA (HCC): ICD-10-CM

## 2021-03-18 DIAGNOSIS — G62.9 NEUROPATHY: Primary | ICD-10-CM

## 2021-03-18 LAB
ALBUMIN SERPL-MCNC: 3.8 G/DL (ref 3.5–5.2)
ALBUMIN/GLOB SERPL: 1.3 G/DL
ALP SERPL-CCNC: 95 U/L (ref 39–117)
ALT SERPL W P-5'-P-CCNC: 15 U/L (ref 1–33)
ANION GAP SERPL CALCULATED.3IONS-SCNC: 8 MMOL/L (ref 5–15)
AST SERPL-CCNC: 13 U/L (ref 1–32)
BASOPHILS # BLD AUTO: 0.02 10*3/MM3 (ref 0–0.2)
BASOPHILS NFR BLD AUTO: 0.4 % (ref 0–1.5)
BILIRUB SERPL-MCNC: 0.2 MG/DL (ref 0–1.2)
BUN SERPL-MCNC: 12 MG/DL (ref 6–20)
BUN/CREAT SERPL: 15.8 (ref 7–25)
CALCIUM SPEC-SCNC: 8.5 MG/DL (ref 8.6–10.5)
CHLORIDE SERPL-SCNC: 106 MMOL/L (ref 98–107)
CO2 SERPL-SCNC: 27 MMOL/L (ref 22–29)
CREAT BLDA-MCNC: 0.7 MG/DL (ref 0.6–1.3)
CREAT SERPL-MCNC: 0.76 MG/DL (ref 0.57–1)
DEPRECATED RDW RBC AUTO: 45.9 FL (ref 37–54)
EOSINOPHIL # BLD AUTO: 0.04 10*3/MM3 (ref 0–0.4)
EOSINOPHIL NFR BLD AUTO: 0.7 % (ref 0.3–6.2)
ERYTHROCYTE [DISTWIDTH] IN BLOOD BY AUTOMATED COUNT: 13.8 % (ref 12.3–15.4)
GFR SERPL CREATININE-BSD FRML MDRD: 78 ML/MIN/1.73
GLOBULIN UR ELPH-MCNC: 3 GM/DL
GLUCOSE SERPL-MCNC: 127 MG/DL (ref 65–99)
HCT VFR BLD AUTO: 39.8 % (ref 34–46.6)
HGB BLD-MCNC: 12.7 G/DL (ref 12–15.9)
LYMPHOCYTES # BLD AUTO: 1.68 10*3/MM3 (ref 0.7–3.1)
LYMPHOCYTES NFR BLD AUTO: 30.3 % (ref 19.6–45.3)
MCH RBC QN AUTO: 30.9 PG (ref 26.6–33)
MCHC RBC AUTO-ENTMCNC: 31.9 G/DL (ref 31.5–35.7)
MCV RBC AUTO: 96.8 FL (ref 79–97)
MONOCYTES # BLD AUTO: 0.45 10*3/MM3 (ref 0.1–0.9)
MONOCYTES NFR BLD AUTO: 8.1 % (ref 5–12)
NEUTROPHILS NFR BLD AUTO: 3.36 10*3/MM3 (ref 1.7–7)
NEUTROPHILS NFR BLD AUTO: 60.5 % (ref 42.7–76)
PLATELET # BLD AUTO: 252 10*3/MM3 (ref 140–450)
PMV BLD AUTO: 8.8 FL (ref 6–12)
POTASSIUM SERPL-SCNC: 3.8 MMOL/L (ref 3.5–5.2)
PROT SERPL-MCNC: 6.8 G/DL (ref 6–8.5)
RBC # BLD AUTO: 4.11 10*6/MM3 (ref 3.77–5.28)
SODIUM SERPL-SCNC: 141 MMOL/L (ref 136–145)
WBC # BLD AUTO: 5.55 10*3/MM3 (ref 3.4–10.8)

## 2021-03-18 PROCEDURE — 82565 ASSAY OF CREATININE: CPT

## 2021-03-18 PROCEDURE — 96375 TX/PRO/DX INJ NEW DRUG ADDON: CPT

## 2021-03-18 PROCEDURE — 25010000002 DEXAMETHASONE SODIUM PHOSPHATE 120 MG/30ML SOLUTION: Performed by: INTERNAL MEDICINE

## 2021-03-18 PROCEDURE — 25010000002 FOSAPREPITANT PER 1 MG: Performed by: INTERNAL MEDICINE

## 2021-03-18 PROCEDURE — 80053 COMPREHEN METABOLIC PANEL: CPT | Performed by: INTERNAL MEDICINE

## 2021-03-18 PROCEDURE — 25010000002 PALONOSETRON 0.25 MG/5ML SOLUTION PREFILLED SYRINGE: Performed by: INTERNAL MEDICINE

## 2021-03-18 PROCEDURE — 96413 CHEMO IV INFUSION 1 HR: CPT

## 2021-03-18 PROCEDURE — 25010000002 CARBOPLATIN PER 50 MG: Performed by: INTERNAL MEDICINE

## 2021-03-18 PROCEDURE — 25010000003 HEPARIN LOCK FLUSH PER 10 UNITS: Performed by: INTERNAL MEDICINE

## 2021-03-18 PROCEDURE — 96377 APPLICATON ON-BODY INJECTOR: CPT

## 2021-03-18 PROCEDURE — 25010000002 DIPHENHYDRAMINE PER 50 MG: Performed by: INTERNAL MEDICINE

## 2021-03-18 PROCEDURE — 25010000002 PEGFILGRASTIM 6 MG/0.6ML PREFILLED SYRINGE KIT: Performed by: INTERNAL MEDICINE

## 2021-03-18 PROCEDURE — 96367 TX/PROPH/DG ADDL SEQ IV INF: CPT

## 2021-03-18 PROCEDURE — 36591 DRAW BLOOD OFF VENOUS DEVICE: CPT

## 2021-03-18 PROCEDURE — 85025 COMPLETE CBC W/AUTO DIFF WBC: CPT | Performed by: INTERNAL MEDICINE

## 2021-03-18 PROCEDURE — 96365 THER/PROPH/DIAG IV INF INIT: CPT

## 2021-03-18 RX ORDER — SODIUM CHLORIDE 9 MG/ML
250 INJECTION, SOLUTION INTRAVENOUS ONCE
Status: COMPLETED | OUTPATIENT
Start: 2021-03-18 | End: 2021-03-18

## 2021-03-18 RX ORDER — SODIUM CHLORIDE 0.9 % (FLUSH) 0.9 %
10 SYRINGE (ML) INJECTION AS NEEDED
Status: DISCONTINUED | OUTPATIENT
Start: 2021-03-18 | End: 2021-03-19 | Stop reason: HOSPADM

## 2021-03-18 RX ORDER — PALONOSETRON 0.05 MG/ML
0.25 INJECTION, SOLUTION INTRAVENOUS ONCE
Status: COMPLETED | OUTPATIENT
Start: 2021-03-18 | End: 2021-03-18

## 2021-03-18 RX ORDER — HEPARIN SODIUM (PORCINE) LOCK FLUSH IV SOLN 100 UNIT/ML 100 UNIT/ML
500 SOLUTION INTRAVENOUS AS NEEDED
Status: DISCONTINUED | OUTPATIENT
Start: 2021-03-18 | End: 2021-03-19 | Stop reason: HOSPADM

## 2021-03-18 RX ORDER — FAMOTIDINE 10 MG/ML
20 INJECTION, SOLUTION INTRAVENOUS ONCE
Status: COMPLETED | OUTPATIENT
Start: 2021-03-18 | End: 2021-03-18

## 2021-03-18 RX ORDER — SODIUM CHLORIDE 0.9 % (FLUSH) 0.9 %
10 SYRINGE (ML) INJECTION AS NEEDED
Status: CANCELLED | OUTPATIENT
Start: 2021-03-18

## 2021-03-18 RX ORDER — HEPARIN SODIUM (PORCINE) LOCK FLUSH IV SOLN 100 UNIT/ML 100 UNIT/ML
500 SOLUTION INTRAVENOUS AS NEEDED
Status: CANCELLED | OUTPATIENT
Start: 2021-03-18

## 2021-03-18 RX ADMIN — Medication 10 ML: at 13:21

## 2021-03-18 RX ADMIN — PALONOSETRON 0.25 MG: 0.25 INJECTION, SOLUTION INTRAVENOUS at 11:41

## 2021-03-18 RX ADMIN — CARBOPLATIN 900 MG: 10 INJECTION, SOLUTION INTRAVENOUS at 12:37

## 2021-03-18 RX ADMIN — FAMOTIDINE 20 MG: 10 INJECTION, SOLUTION INTRAVENOUS at 11:41

## 2021-03-18 RX ADMIN — DIPHENHYDRAMINE HYDROCHLORIDE 50 MG: 50 INJECTION, SOLUTION INTRAMUSCULAR; INTRAVENOUS at 11:41

## 2021-03-18 RX ADMIN — HEPARIN 500 UNITS: 100 SYRINGE at 13:21

## 2021-03-18 RX ADMIN — SODIUM CHLORIDE 100 ML: 9 INJECTION, SOLUTION INTRAVENOUS at 12:00

## 2021-03-18 RX ADMIN — PEGFILGRASTIM 6 MG: KIT SUBCUTANEOUS at 13:16

## 2021-03-18 RX ADMIN — SODIUM CHLORIDE 250 ML: 9 INJECTION, SOLUTION INTRAVENOUS at 11:19

## 2021-03-18 RX ADMIN — DEXAMETHASONE SODIUM PHOSPHATE 20 MG: 4 INJECTION, SOLUTION INTRA-ARTICULAR; INTRALESIONAL; INTRAMUSCULAR; INTRAVENOUS; SOFT TISSUE at 11:21

## 2021-03-18 NOTE — PATIENT INSTRUCTIONS
Start taking regular Claritin today and take for 5 days to help prevent bone pain from Neulasta injection.     Neulasta On-Pro can come off tomorrow (3/19) at 5:30pm. Call if your light turns red or you notice any leaking around the bandage.

## 2021-03-18 NOTE — PROGRESS NOTES
Pt here for Cycle 2 Carbo/Taxol.  Pt with c/o numbness and tingling in bilateral hands and feet. Pt states that she is dropping things and cannot tell what she is grabbing from her purse.  She states that she noticed it about two days after her first treatment.  Awaiting orders from Dr Neal.  Labs within normal limits.  Orders received from Dr Neal to hold Taxol today and give Carbo only.  Referral placed for neurology Dr Seipel.  Pt to be scheduled to see Dr Neal on 3/31 per Dr Neal.  Spoke w/ Lora to schedule pt for f/u and next chemo.  She v/u.

## 2021-03-30 NOTE — PROGRESS NOTES
Hematology/Oncology Outpatient Follow Up    PATIENT NAME:Xin Nj  :1965  MRN: [id number]  PRIMARY CARE PHYSICIAN: Park Dubose MD  REFERRING PHYSICIAN: Park Dubose,*    Chief Complaint   Patient presents with   • Appointment     Malignant neoplasm of uterus, unspecified site (CMS/HCC)   • Follow-up        HISTORY OF PRESENT ILLNESS:     This is a 55-year-old female who developed postmenopausal vaginal bleeding for approximately one and half years.  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  On 2020-10-31 patient had CT scan of the abdomen and pelvis with contrast there were no acute findings in the abdomen or pelvis.  Stable hypodense lesion in the liver   Patient was then referred to Dr. Jama with [facility] and on 11/10/2020 patient underwent   total robotic hysterectomy, bilateral salpingo-oophorectomy and sentinel lymphadenectomy.  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  There was no significant pathology identified in the right or left ovaries.  One left pelvic sentinel lymph node was benign for malignancy.  On the right pelvic  7 benign lymph nodes we are removed.  The mass measured 7 cm in size.  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  There was evidence of lymphovascular invasion.  Pathology stage is pT1bN0 FIGO 1B     Dr. Jama and team have recommended adjuvant chemotherapy with carboplatinum and Taxol sandwiched with external beam radiation and vaginal cuff brachytherapy.  Total of 6 cycles of chemotherapy with carboplatinum and Taxol has been recommended with 3 cycles prior to radiation and subsequent 3 cycles after radiation in a sandwich fashion.     Patient has a history of right breast cancer that was diagnosed in .  She underwent right lumpectomy followed  by chemotherapy for 4 cycles.  Adjuvant radiation was recommended but she declined radiation treatments.  Patient states that her tumor was infiltrated on her right chest wall and for that reason she aborted all future treatments.  She has been without any evidence of relapsed disease.  Patient still has her port from 2003 which has not been flushed or cared for since then.     She is accompanied today by 2 of her daughters one by phone for this visit      · 2/23/2021 patient was initiated on adjuvant chemotherapy with carboplatinum and Taxol  · 3/18/2021: Patient had cycle 2 chemotherapy with carboplatinum only.  Taxol was held  · Taxol was held with second cycle of chemotherapy due to significant neuropathy.  This has now improved.  She is currently on Neurontin 600 mg 3 times a day.  We discussed that neuropathy is irreversible  Past Medical History:   Diagnosis Date   • Anxiety    • COPD (chronic obstructive pulmonary disease) (CMS/HCC)    • COPD (chronic obstructive pulmonary disease) (CMS/HCC)    • Depression    • Encounter for antineoplastic radiation therapy    • History of right breast cancer    • Osteoporosis        Past Surgical History:   Procedure Laterality Date   • BREAST LUMPECTOMY Right    • HYSTERECTOMY  2020         Current Outpatient Medications:   •  albuterol sulfate  (90 Base) MCG/ACT inhaler, inhale 1 PUFF every 4 HOURS as needed, Disp: , Rfl:   •  ALPRAZolam XR 0.5 MG 24 hr tablet, TAKE ONE TABLET BY MOUTH EVERY MORNING AS NEEDED, Disp: , Rfl:   •  budesonide-formoterol (SYMBICORT) 160-4.5 MCG/ACT inhaler, Inhale 2 puffs 2 (Two) Times a Day., Disp: , Rfl:   •  calcium carbonate-vitamin d 600-400 MG-UNIT per tablet, calcium carbonate 600 mg (1,500 mg)-vitamin D3 400 unit tablet  TAKE 1 TABLET BY MOUTH EVERY DAY WITH FOOD, Disp: , Rfl:   •  cholecalciferol (VITAMIN D3) 25 MCG (1000 UT) tablet, Take 1,000 Units by mouth Daily., Disp: , Rfl:   •  cloNIDine (CATAPRES) 0.2 MG tablet,  clonidine HCl 0.2 mg tablet, Disp: , Rfl:   •  dexamethasone (DECADRON) 4 MG tablet, Take 2 tablets in the morning daily on days 2, 3 & 4.  Take with food., Disp: 6 tablet, Rfl: 5  •  diclofenac sodium (VOTAREN XR) 100 MG 24 hr tablet, diclofenac  mg tablet,extended release 24 hr, Disp: , Rfl:   •  dicyclomine (BENTYL) 20 MG tablet, Take 20 mg by mouth 3 (Three) Times a Day., Disp: , Rfl:   •  DULoxetine (CYMBALTA) 60 MG capsule, Take 60 mg by mouth Daily., Disp: , Rfl:   •  gabapentin (NEURONTIN) 600 MG tablet, Take 600 mg by mouth 3 (Three) Times a Day., Disp: , Rfl:   •  gabapentin (NEURONTIN) 800 MG tablet, Take 800 mg by mouth 3 (Three) Times a Day., Disp: , Rfl:   •  HYDROcodone-acetaminophen (NORCO)  MG per tablet, TAKE ONE TABLET BY MOUTH EVERY 8 HOURS AS NEEDED FOR 7 DAYS, Disp: , Rfl:   •  lidocaine-prilocaine (EMLA) 2.5-2.5 % cream, Apply  topically to the appropriate area as directed As Needed (prior to accessing port)., Disp: 30 g, Rfl: 2  •  omeprazole (priLOSEC) 40 MG capsule, Take 1 capsule by mouth Daily., Disp: 30 capsule, Rfl: 6  •  ondansetron (ZOFRAN) 8 MG tablet, Take 1 tablet by mouth 3 (Three) Times a Day As Needed for Nausea or Vomiting., Disp: 30 tablet, Rfl: 5  •  promethazine (PHENERGAN) 12.5 MG tablet, Take 12.5 mg by mouth Every 12 (Twelve) Hours As Needed., Disp: , Rfl:   •  sennosides-docusate (PERICOLACE) 8.6-50 MG per tablet, , Disp: , Rfl:   •  TiZANidine (ZANAFLEX) 4 MG capsule, Take 4 mg by mouth 3 (Three) Times a Day As Needed., Disp: , Rfl:   •  vitamin B-6 (PYRIDOXINE) 100 MG tablet, Take 1 tablet by mouth 2 (two) times a day., Disp: 60 tablet, Rfl: 5    No Known Allergies    Family History   Problem Relation Age of Onset   • Stroke Mother    • Lung cancer Brother        Cancer-related family history includes Lung cancer in her brother.    Social History     Tobacco Use   • Smoking status: Current Every Day Smoker   • Tobacco comment: down to 1 ppd   Substance Use  "Topics   • Alcohol use: Not Currently   • Drug use: Never       HPI, ROS and PFSH have been reviewed and confirmed on 3/31/2021.     SUBJECTIVE:    She is here today for follow-up.  Her neuropathy has improved        REVIEW OF SYSTEMS:  Review of Systems   Constitutional: Negative for chills and fever.   HENT: Negative for ear pain, mouth sores, nosebleeds and sore throat.    Eyes: Negative for photophobia and visual disturbance.   Respiratory: Negative for wheezing and stridor.    Cardiovascular: Negative for chest pain and palpitations.   Gastrointestinal: Negative for abdominal pain, diarrhea, nausea and vomiting.   Endocrine: Negative for cold intolerance and heat intolerance.   Genitourinary: Negative for dysuria and hematuria.   Musculoskeletal: Negative for joint swelling and neck stiffness.   Skin: Negative for color change and rash.   Neurological: Negative for seizures and syncope.   Hematological: Negative for adenopathy.        No obvious bleeding   Psychiatric/Behavioral: Negative for agitation, confusion and hallucinations.       OBJECTIVE:    Vitals:    03/31/21 1010   BP: 112/78   Pulse: 76   Resp: 18   Temp: 97.2 °F (36.2 °C)   Weight: 98.4 kg (217 lb)   Height: 172.7 cm (68\")   PainSc:   6   PainLoc: Generalized  Comment: legs, feet, lower back     Body mass index is 32.99 kg/m².    ECOG  (0) Fully active, able to carry on all predisease performance without restriction    Physical Exam  Vitals and nursing note reviewed.   Constitutional:       General: She is not in acute distress.     Appearance: She is not diaphoretic.   HENT:      Head: Normocephalic and atraumatic.   Eyes:      General: No scleral icterus.        Right eye: No discharge.         Left eye: No discharge.      Conjunctiva/sclera: Conjunctivae normal.   Neck:      Thyroid: No thyromegaly.   Cardiovascular:      Rate and Rhythm: Normal rate and regular rhythm.      Heart sounds: Normal heart sounds. No friction rub. No gallop.  "   Pulmonary:      Effort: Pulmonary effort is normal. No respiratory distress.      Breath sounds: No stridor. No wheezing.   Abdominal:      General: Bowel sounds are normal.      Palpations: Abdomen is soft. There is no mass.      Tenderness: There is no abdominal tenderness. There is no guarding or rebound.   Musculoskeletal:         General: No tenderness. Normal range of motion.      Cervical back: Normal range of motion and neck supple.   Lymphadenopathy:      Cervical: No cervical adenopathy.   Skin:     General: Skin is warm.      Findings: No erythema or rash.   Neurological:      Mental Status: She is alert and oriented to person, place, and time.      Motor: No abnormal muscle tone.   Psychiatric:         Behavior: Behavior normal.     I have reexamined the patient and the results are consistent with the previously documented exam. Juliana Neal MD     RECENT LABS  WBC   Date Value Ref Range Status   03/18/2021 5.55 3.40 - 10.80 10*3/mm3 Final     RBC   Date Value Ref Range Status   03/18/2021 4.11 3.77 - 5.28 10*6/mm3 Final     Hemoglobin   Date Value Ref Range Status   03/18/2021 12.7 12.0 - 15.9 g/dL Final     Hematocrit   Date Value Ref Range Status   03/18/2021 39.8 34.0 - 46.6 % Final     MCV   Date Value Ref Range Status   03/18/2021 96.8 79.0 - 97.0 fL Final     MCH   Date Value Ref Range Status   03/18/2021 30.9 26.6 - 33.0 pg Final     MCHC   Date Value Ref Range Status   03/18/2021 31.9 31.5 - 35.7 g/dL Final     RDW   Date Value Ref Range Status   03/18/2021 13.8 12.3 - 15.4 % Final     RDW-SD   Date Value Ref Range Status   03/18/2021 45.9 37.0 - 54.0 fl Final     MPV   Date Value Ref Range Status   03/18/2021 8.8 6.0 - 12.0 fL Final     Platelets   Date Value Ref Range Status   03/18/2021 252 140 - 450 10*3/mm3 Final     Neutrophil %   Date Value Ref Range Status   03/18/2021 60.5 42.7 - 76.0 % Final     Lymphocyte %   Date Value Ref Range Status   03/18/2021 30.3 19.6 - 45.3 %  Final     Monocyte %   Date Value Ref Range Status   03/18/2021 8.1 5.0 - 12.0 % Final     Eosinophil %   Date Value Ref Range Status   03/18/2021 0.7 0.3 - 6.2 % Final     Basophil %   Date Value Ref Range Status   03/18/2021 0.4 0.0 - 1.5 % Final     Neutrophils, Absolute   Date Value Ref Range Status   03/18/2021 3.36 1.70 - 7.00 10*3/mm3 Final     Lymphocytes, Absolute   Date Value Ref Range Status   03/18/2021 1.68 0.70 - 3.10 10*3/mm3 Final     Monocytes, Absolute   Date Value Ref Range Status   03/18/2021 0.45 0.10 - 0.90 10*3/mm3 Final     Eosinophils, Absolute   Date Value Ref Range Status   03/18/2021 0.04 0.00 - 0.40 10*3/mm3 Final     Basophils, Absolute   Date Value Ref Range Status   03/18/2021 0.02 0.00 - 0.20 10*3/mm3 Final     nRBC   Date Value Ref Range Status   02/04/2021 0.0 0.0 - 0.2 /100 WBC Final       Lab Results   Component Value Date    GLUCOSE 127 (H) 03/18/2021    BUN 12 03/18/2021    CREATININE 0.70 03/18/2021    EGFRIFNONA 78 03/18/2021    BCR 15.8 03/18/2021    K 3.8 03/18/2021    CO2 27.0 03/18/2021    CALCIUM 8.5 (L) 03/18/2021    ALBUMIN 3.80 03/18/2021    AST 13 03/18/2021    ALT 15 03/18/2021         Assessment/Plan     Malignant neoplasm of endometrium (CMS/HCC)  - CBC & Differential    Chemotherapy induced neutropenia (CMS/HCC)  - CBC & Differential      ASSESSMENT:       Malignant neoplasm of uterus, unspecified site (CMS/HCC)  - CBC & Differential  - Comprehensive Metabolic Panel  -   - IR Port Placement  - Ambulatory Referral to ONC Social Work  - MRSA Screen Culture (Outpatient) - Swab, Nares  - IR Removal Tunnel CV With Port Different Site     Malignant neoplasm of endometrium (CMS/HCC)   -   - IR Port Placement  - Ambulatory Referral to ONC Social Work     Depression, unspecified depression type  - IR Port Placement  - Ambulatory Referral to ONC Social Work     Anxiety  - IR Port Placement  - Ambulatory Referral to ONC Social Work        1. 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 Adjuvant chemotherapy and radiation has been recommended by Dr. Jama with combination of carboplatinum and Taxol with external beam radiation and vaginal brachytherapy in sandwiched fashion.  3. Adjuvant chemotherapy with carboplatinum and Taxol initiated  4. Chemotherapy induced fatigue  5. Mild peripheral neuropathy grade 2  6. Severe thrombocytopenia secondary to chemotherapy: Will need platelet transfusion today  7. Dyspepsia: On Prilosec  8. Chronic pain and anxiety management per PCP Dr. Dubose: Patient will continue to follow-up with Dr. Dubose for pain management and anxiety management.  9. History of invasive right breast cancer, status post right lumpectomy followed by chemotherapy for 4 cycles.  Will call [facility] for those records  10. Left Mediport since 2003: Examined by interventional radiologist and port is functional.  Port was therefore not removed     Discussion     I have reviewed her imaging studies, path report and clinical history.  I have also fully examined patient.  She has completely resected carcinosarcoma of the endometrium.  She understand that she has a high risk malignancy with high risk for relapse without additional adjuvant treatments.  Patient will receive combination of carboplatinum and Taxol every 21 days for 3 cycles followed by WPRT and brachytherapy, followed by additional 3 cycles of chemotherapy in a sandwich fashion.  Patient has an appointment to see Dr. Glover this afternoon.  I reviewed the side effects of chemotherapy with carboplatinum and Taxol with her to include but not limited to:     Chemotherapy side effects include, but not limited to, nausea, vomiting, bone marrow suppression, which can result in blood, platelet transfusion. There is also risk of permanent bone marrow destruction, which can cause myelodysplastic syndrome or  leukemia years down the line. There is risk of infection which can result in hospitalization and even death. There is also risk of fatigue, asthenia, alopecia which could become permanent. Chemo will help to reduce risk of relapse of cancer, but does not eliminate risk completely.     I also discussed that specifically Taxol chemotherapy can lead to hypersensitivity reaction, peripheral neuropathy, there is also a risk of permanent alopecia less than 1%, fluid retention, myalgias.     Carboplatinum can also lead to significant thrombocytopenia which may require platelet transfusions     Patient has probable nonfunctioning port on the left chest wall, I have recommended removal and insertion of a new port for chemotherapy                    Plans:     · Continue with chemotherapy but dose reduce carboplatin to AUC of 5 with the rest of the cycles, dose reduce Taxol by 25% with the rest of her remaining cycles.  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  Patient states that her neuropathy has improved to current grade 1 at this time  · Transfuse platelets today and repeat CBC on Friday this week  ·  to help with transportation needs.  · Plan chemotherapy when due next week  · Patient given instructions to call for problems  · Continue Prilosec 40 mg p.o. daily  · Continue Vitamin B6 100 mg p.o. twice a day for neuropathy  · Continue supportive care medications  · EMLA cream  · Reviewed her labs today  · She will follow up with me 2 weeks  · All questions answered.        Thank you very much for allowing me to participate in the care of Leidy, I will keep you updated on her progress        I spent 40 total minutes, face-to-face, caring for Xin today.  80% of this time involved counseling and/or coordination of care as documented within this note.

## 2021-03-31 ENCOUNTER — DOCUMENTATION (OUTPATIENT)
Dept: ONCOLOGY | Facility: HOSPITAL | Age: 56
End: 2021-03-31

## 2021-03-31 ENCOUNTER — TELEPHONE (OUTPATIENT)
Dept: ONCOLOGY | Facility: HOSPITAL | Age: 56
End: 2021-03-31

## 2021-03-31 ENCOUNTER — LAB (OUTPATIENT)
Dept: LAB | Facility: HOSPITAL | Age: 56
End: 2021-03-31

## 2021-03-31 ENCOUNTER — OFFICE VISIT (OUTPATIENT)
Dept: ONCOLOGY | Facility: CLINIC | Age: 56
End: 2021-03-31

## 2021-03-31 ENCOUNTER — TELEPHONE (OUTPATIENT)
Dept: INFUSION THERAPY | Facility: HOSPITAL | Age: 56
End: 2021-03-31

## 2021-03-31 ENCOUNTER — HOSPITAL ENCOUNTER (OUTPATIENT)
Dept: INFUSION THERAPY | Facility: HOSPITAL | Age: 56
Setting detail: INFUSION SERIES
Discharge: HOME | End: 2021-03-31

## 2021-03-31 VITALS
SYSTOLIC BLOOD PRESSURE: 112 MMHG | DIASTOLIC BLOOD PRESSURE: 78 MMHG | TEMPERATURE: 97.2 F | BODY MASS INDEX: 32.89 KG/M2 | HEART RATE: 76 BPM | RESPIRATION RATE: 18 BRPM | WEIGHT: 217 LBS | HEIGHT: 68 IN

## 2021-03-31 DIAGNOSIS — D70.1 CHEMOTHERAPY INDUCED NEUTROPENIA (HCC): ICD-10-CM

## 2021-03-31 DIAGNOSIS — C54.1 MALIGNANT NEOPLASM OF ENDOMETRIUM (HCC): Primary | ICD-10-CM

## 2021-03-31 DIAGNOSIS — D69.59 DRUG-INDUCED LOW PLATELET COUNT: ICD-10-CM

## 2021-03-31 DIAGNOSIS — T45.1X5A CHEMOTHERAPY INDUCED NEUTROPENIA (HCC): ICD-10-CM

## 2021-03-31 DIAGNOSIS — T50.905A DRUG-INDUCED LOW PLATELET COUNT: ICD-10-CM

## 2021-03-31 DIAGNOSIS — Z74.8 ASSISTANCE NEEDED WITH TRANSPORTATION: ICD-10-CM

## 2021-03-31 DIAGNOSIS — C54.1 MALIGNANT NEOPLASM OF ENDOMETRIUM (HCC): ICD-10-CM

## 2021-03-31 DIAGNOSIS — C55 MALIGNANT NEOPLASM OF UTERUS, UNSPECIFIED SITE (HCC): ICD-10-CM

## 2021-03-31 LAB
BASOPHILS # BLD AUTO: 0 10*3/MM3 (ref 0–0.2)
BASOPHILS # BLD AUTO: 0.01 10*3/MM3 (ref 0–0.2)
BASOPHILS NFR BLD AUTO: 0.2 % (ref 0–1.5)
BASOPHILS NFR BLD AUTO: 0.2 % (ref 0–1.5)
DEPRECATED RDW RBC AUTO: 43.4 FL (ref 37–54)
DEPRECATED RDW RBC AUTO: 43.8 FL (ref 37–54)
EOSINOPHIL # BLD AUTO: 0 10*3/MM3 (ref 0–0.4)
EOSINOPHIL # BLD AUTO: 0.02 10*3/MM3 (ref 0–0.4)
EOSINOPHIL NFR BLD AUTO: 0.2 % (ref 0.3–6.2)
EOSINOPHIL NFR BLD AUTO: 0.3 % (ref 0.3–6.2)
ERYTHROCYTE [DISTWIDTH] IN BLOOD BY AUTOMATED COUNT: 13.1 % (ref 12.3–15.4)
ERYTHROCYTE [DISTWIDTH] IN BLOOD BY AUTOMATED COUNT: 13.6 % (ref 12.3–15.4)
HCT VFR BLD AUTO: 31.2 % (ref 34–46.6)
HCT VFR BLD AUTO: 33.4 % (ref 34–46.6)
HGB BLD-MCNC: 10.7 G/DL (ref 12–15.9)
HGB BLD-MCNC: 11 G/DL (ref 12–15.9)
LYMPHOCYTES # BLD AUTO: 1.89 10*3/MM3 (ref 0.7–3.1)
LYMPHOCYTES # BLD AUTO: 2.1 10*3/MM3 (ref 0.7–3.1)
LYMPHOCYTES NFR BLD AUTO: 28.9 % (ref 19.6–45.3)
LYMPHOCYTES NFR BLD AUTO: 31.3 % (ref 19.6–45.3)
MCH RBC QN AUTO: 31.3 PG (ref 26.6–33)
MCH RBC QN AUTO: 31.4 PG (ref 26.6–33)
MCHC RBC AUTO-ENTMCNC: 32.9 G/DL (ref 31.5–35.7)
MCHC RBC AUTO-ENTMCNC: 34.2 G/DL (ref 31.5–35.7)
MCV RBC AUTO: 92 FL (ref 79–97)
MCV RBC AUTO: 94.9 FL (ref 79–97)
MONOCYTES # BLD AUTO: 0.2 10*3/MM3 (ref 0.1–0.9)
MONOCYTES # BLD AUTO: 0.38 10*3/MM3 (ref 0.1–0.9)
MONOCYTES NFR BLD AUTO: 3.4 % (ref 5–12)
MONOCYTES NFR BLD AUTO: 5.8 % (ref 5–12)
NEUTROPHILS NFR BLD AUTO: 4.23 10*3/MM3 (ref 1.7–7)
NEUTROPHILS NFR BLD AUTO: 4.3 10*3/MM3 (ref 1.7–7)
NEUTROPHILS NFR BLD AUTO: 64.8 % (ref 42.7–76)
NEUTROPHILS NFR BLD AUTO: 64.9 % (ref 42.7–76)
NRBC BLD AUTO-RTO: 0 /100 WBC (ref 0–0.2)
PLATELET # BLD AUTO: 23 10*3/MM3 (ref 140–450)
PLATELET # BLD AUTO: 30 10*3/MM3 (ref 140–450)
PMV BLD AUTO: 10.4 FL (ref 6–12)
PMV BLD AUTO: 7.7 FL (ref 6–12)
RBC # BLD AUTO: 3.39 10*6/MM3 (ref 3.77–5.28)
RBC # BLD AUTO: 3.52 10*6/MM3 (ref 3.77–5.28)
RBC MORPH BLD: NORMAL
SMALL PLATELETS BLD QL SMEAR: NORMAL
WBC # BLD AUTO: 6.53 10*3/MM3 (ref 3.4–10.8)
WBC # BLD AUTO: 6.6 10*3/MM3 (ref 3.4–10.8)
WBC MORPH BLD: NORMAL

## 2021-03-31 PROCEDURE — 36415 COLL VENOUS BLD VENIPUNCTURE: CPT

## 2021-03-31 PROCEDURE — 85025 COMPLETE CBC W/AUTO DIFF WBC: CPT

## 2021-03-31 PROCEDURE — 36591 DRAW BLOOD OFF VENOUS DEVICE: CPT

## 2021-03-31 PROCEDURE — 85007 BL SMEAR W/DIFF WBC COUNT: CPT | Performed by: INTERNAL MEDICINE

## 2021-03-31 PROCEDURE — 85025 COMPLETE CBC W/AUTO DIFF WBC: CPT | Performed by: INTERNAL MEDICINE

## 2021-03-31 PROCEDURE — 25010000003 HEPARIN LOCK FLUSH PER 10 UNITS: Performed by: INTERNAL MEDICINE

## 2021-03-31 PROCEDURE — 99215 OFFICE O/P EST HI 40 MIN: CPT | Performed by: INTERNAL MEDICINE

## 2021-03-31 RX ORDER — HEPARIN SODIUM (PORCINE) LOCK FLUSH IV SOLN 100 UNIT/ML 100 UNIT/ML
500 SOLUTION INTRAVENOUS AS NEEDED
Status: DISCONTINUED | OUTPATIENT
Start: 2021-03-31 | End: 2021-04-02 | Stop reason: HOSPADM

## 2021-03-31 RX ORDER — HYDROCODONE BITARTRATE AND ACETAMINOPHEN 10; 325 MG/1; MG/1
TABLET ORAL
COMMUNITY
Start: 2021-03-27 | End: 2021-07-02

## 2021-03-31 RX ORDER — SODIUM CHLORIDE 0.9 % (FLUSH) 0.9 %
10 SYRINGE (ML) INJECTION AS NEEDED
Status: DISCONTINUED | OUTPATIENT
Start: 2021-03-31 | End: 2021-04-02 | Stop reason: HOSPADM

## 2021-03-31 RX ORDER — SODIUM CHLORIDE 0.9 % (FLUSH) 0.9 %
10 SYRINGE (ML) INJECTION AS NEEDED
Status: CANCELLED | OUTPATIENT
Start: 2021-03-31

## 2021-03-31 RX ORDER — ACETAMINOPHEN 325 MG/1
650 TABLET ORAL ONCE
Status: CANCELLED | OUTPATIENT
Start: 2021-03-31 | End: 2021-03-31

## 2021-03-31 RX ORDER — AMOXICILLIN 250 MG
CAPSULE ORAL
COMMUNITY
Start: 2020-11-10 | End: 2021-11-12

## 2021-03-31 RX ORDER — DICYCLOMINE HCL 20 MG
20 TABLET ORAL 3 TIMES DAILY
COMMUNITY
Start: 2021-03-20 | End: 2021-11-12

## 2021-03-31 RX ORDER — DIPHENHYDRAMINE HCL 25 MG
25 CAPSULE ORAL ONCE
Status: CANCELLED | OUTPATIENT
Start: 2021-03-31 | End: 2021-03-31

## 2021-03-31 RX ORDER — SODIUM CHLORIDE 9 MG/ML
250 INJECTION, SOLUTION INTRAVENOUS AS NEEDED
Status: CANCELLED | OUTPATIENT
Start: 2021-03-31

## 2021-03-31 RX ORDER — HEPARIN SODIUM (PORCINE) LOCK FLUSH IV SOLN 100 UNIT/ML 100 UNIT/ML
500 SOLUTION INTRAVENOUS AS NEEDED
Status: CANCELLED | OUTPATIENT
Start: 2021-03-31

## 2021-03-31 RX ADMIN — Medication 500 UNITS: at 13:56

## 2021-03-31 NOTE — TELEPHONE ENCOUNTER
Patient was sent to ambulatory care for platelet transfusion. I received a call from Guillermina at [facility] and she said patient's plts were 30 and the order was to transfuse if less than 30. I discussed this Dr. Neal and she said she does not need to get the platelets today and to bring her back to our office on Friday for a repeat CBC. I attempted to call the patient to schedule her and she did not answer and her voicemail box is full. Will try again tomorrow.

## 2021-03-31 NOTE — PROGRESS NOTES
Case Management/ Note    Patient Name: Xin Nj  YOB: 1965  MRN #: [id number]    OSW met with patient and her daughter, Belle at the request of FAUSTINO Barrett as patient has transportation needs. Patient needs blood work on Friday and her daughter works. OSW suggested that Xin get her blood work done at [facility] and utilize [facility], and they are agreeable. OSW called [facility] but they are closed on Friday as it is a holiday. Xin said they can call a friend if needed for blood draw at [facility]. Also, Belle said she was working during her appointment with Stephanie Cyr; OSW called that office to see if they can get a later appointment in the day and was told there was not another opening until 5/24.     Xin's  appointments for 4/7 and 4/13 were scheduled with [facility]. 4/7 appointment will have a  time of 759 AM and they request patient is ready one hour before this. Trip # is [id number].     4/13 appointment will have a  time of 1021 AM and they request patient is ready one hour before this. Trip # is [id number].     OSW left a message with Belle and this information sent to [employer].     Electronically signed by:   Eladia Laguna LCSW, OSW-C  03/31/21, 15:21 EDT

## 2021-04-01 DIAGNOSIS — C54.1 MALIGNANT NEOPLASM OF ENDOMETRIUM (HCC): Primary | ICD-10-CM

## 2021-04-01 DIAGNOSIS — D69.6 THROMBOCYTOPENIA (HCC): ICD-10-CM

## 2021-04-01 NOTE — PROGRESS NOTES
Patient called to schedule a repeat CBC per Dr. Neal. Her platelets were 30 yesterday so she did not end up getting a platelet transfusion.  just wanted to make sure they don't continue to drop.

## 2021-04-02 ENCOUNTER — TELEPHONE (OUTPATIENT)
Dept: ONCOLOGY | Facility: CLINIC | Age: 56
End: 2021-04-02

## 2021-04-02 ENCOUNTER — TELEPHONE (OUTPATIENT)
Dept: ONCOLOGY | Facility: HOSPITAL | Age: 56
End: 2021-04-02

## 2021-04-02 DIAGNOSIS — C54.1 MALIGNANT NEOPLASM OF ENDOMETRIUM (HCC): Primary | ICD-10-CM

## 2021-04-02 PROBLEM — M25.569 KNEE PAIN: Status: ACTIVE | Noted: 2021-04-02

## 2021-04-02 PROBLEM — E66.9 OBESITY: Status: ACTIVE | Noted: 2021-04-02

## 2021-04-02 PROBLEM — M79.7 FIBROMYALGIA: Status: ACTIVE | Noted: 2021-04-02

## 2021-04-02 PROBLEM — C55 MALIGNANT NEOPLASM OF UTERUS (HCC): Status: ACTIVE | Noted: 2021-04-02

## 2021-04-02 PROBLEM — M19.90 ARTHRITIS: Status: ACTIVE | Noted: 2021-04-02

## 2021-04-02 PROBLEM — C50.919 MALIGNANT NEOPLASM OF BREAST (HCC): Status: ACTIVE | Noted: 2021-04-02

## 2021-04-02 PROBLEM — J44.9 CHRONIC OBSTRUCTIVE PULMONARY DISEASE: Status: ACTIVE | Noted: 2021-04-02

## 2021-04-02 PROBLEM — M54.50 LOW BACK PAIN: Status: ACTIVE | Noted: 2021-04-02

## 2021-04-02 RX ORDER — DIPHENHYDRAMINE HYDROCHLORIDE 50 MG/ML
50 INJECTION INTRAMUSCULAR; INTRAVENOUS AS NEEDED
Status: CANCELLED | OUTPATIENT
Start: 2021-04-08

## 2021-04-02 RX ORDER — SODIUM CHLORIDE 9 MG/ML
250 INJECTION, SOLUTION INTRAVENOUS ONCE
Status: CANCELLED | OUTPATIENT
Start: 2021-04-08

## 2021-04-02 RX ORDER — FAMOTIDINE 10 MG/ML
20 INJECTION, SOLUTION INTRAVENOUS ONCE
Status: CANCELLED | OUTPATIENT
Start: 2021-04-08

## 2021-04-02 RX ORDER — PALONOSETRON 0.05 MG/ML
0.25 INJECTION, SOLUTION INTRAVENOUS ONCE
Status: CANCELLED | OUTPATIENT
Start: 2021-04-08

## 2021-04-02 RX ORDER — FAMOTIDINE 10 MG/ML
20 INJECTION, SOLUTION INTRAVENOUS AS NEEDED
Status: CANCELLED | OUTPATIENT
Start: 2021-04-08

## 2021-04-02 NOTE — TELEPHONE ENCOUNTER
PT called and wanted cbc faxed to [facility] it was faxed at 4:00 pm today and [facility] COM[facility]

## 2021-04-02 NOTE — TELEPHONE ENCOUNTER
Caller: MICAELA    Relationship to patient: PATIENT     Best call back number: [phone]    Type of visit: LAB    Requested date: NEXT AVAILABLE APPT    If rescheduling, when is the original appointment: 04/02    Additional notes: PLATELET CHECK. PT MISSED HER APPT TODAY

## 2021-04-03 NOTE — TELEPHONE ENCOUNTER
Received call from Dr Neal stating that [facility] called reporting pt's platelets are 25.  Orders received to have pt receive one unit platelets at [facility] tomorrow.  Pt to be premedicated w/ Tylenol 650 mg and Benadryl 50 mg.  Attempted to call pt at both of her numbers and unable to leave voicemails due to mailbox being full.  Attempted to call pt's daughter and had to leave a  requesting a call back.  Dr Neal notified that I am unable to get a hold of pt or daughter at this time.

## 2021-04-06 ENCOUNTER — TELEPHONE (OUTPATIENT)
Dept: ONCOLOGY | Facility: CLINIC | Age: 56
End: 2021-04-06

## 2021-04-06 NOTE — TELEPHONE ENCOUNTER
Attempted to call pt to let her know that she needs a repeat CBC. No answer, VM full. Will attempt to call back at a later time.

## 2021-04-06 NOTE — TELEPHONE ENCOUNTER
----- Message from Juliana Neal MD sent at 4/5/2021 12:44 PM EDT -----  She needs a repeat CBC as her platelets were only 25,000.

## 2021-04-08 ENCOUNTER — TELEPHONE (OUTPATIENT)
Dept: ONCOLOGY | Facility: CLINIC | Age: 56
End: 2021-04-08

## 2021-04-08 ENCOUNTER — APPOINTMENT (OUTPATIENT)
Dept: ONCOLOGY | Facility: HOSPITAL | Age: 56
End: 2021-04-08

## 2021-04-08 NOTE — TELEPHONE ENCOUNTER
Called pt to let her know that she needs a repeat CBC. She said she wanted to have it done at [facility]. Order faxed to [facility].

## 2021-04-09 ENCOUNTER — TELEPHONE (OUTPATIENT)
Dept: ONCOLOGY | Facility: HOSPITAL | Age: 56
End: 2021-04-09

## 2021-04-09 NOTE — TELEPHONE ENCOUNTER
Called patient regarding missed appointment and transport, no answer. Left message asking patient to call back to reschedule.

## 2021-04-09 NOTE — TELEPHONE ENCOUNTER
Attempted to call patient again. Patient answered but bad connection attempted to call back and patient did not answer. Left voicemail earlier asking her to call back and reschedule.

## 2021-04-13 ENCOUNTER — TELEPHONE (OUTPATIENT)
Dept: ONCOLOGY | Facility: CLINIC | Age: 56
End: 2021-04-13

## 2021-04-13 NOTE — TELEPHONE ENCOUNTER
Attempted to call pt to follow-up with her since she missed her appointment this morning. Pt did not answer, VM full. I also called the pt's daughter and left a message on an identifying VM with callback number.

## 2021-04-19 ENCOUNTER — TELEPHONE (OUTPATIENT)
Dept: ONCOLOGY | Facility: HOSPITAL | Age: 56
End: 2021-04-19

## 2021-04-19 NOTE — TELEPHONE ENCOUNTER
Case Management/ Note    Patient Name: Xin Nj  YOB: 1965  MRN #: [id number]    OSW called patient regarding needs and left a message requesting call back.     Electronically signed by:   Eladia Laguna LCSW, OSW-C  04/19/21, 10:34 EDT

## 2021-04-23 ENCOUNTER — TELEPHONE (OUTPATIENT)
Dept: ONCOLOGY | Facility: CLINIC | Age: 56
End: 2021-04-23

## 2021-04-23 NOTE — TELEPHONE ENCOUNTER
Caller: Xin Nj    Relationship to patient: Self    Best call back number: [phone]    Chief complaint: PATIENT WAS NOT AWARE THAT SHE HAD AN APPT.ON 4/30/2021 & WOULD LIKE TO KNOW IF SHE CAN COME IN A LITTLE LATER THAT MORNING.  PLEASE CALL TO ADVISE.    Type of visit: INFUSION    Requested date: SAME DATE LATER TIME

## 2021-04-26 ENCOUNTER — TELEPHONE (OUTPATIENT)
Dept: ONCOLOGY | Facility: CLINIC | Age: 56
End: 2021-04-26

## 2021-04-26 NOTE — TELEPHONE ENCOUNTER
Let Pt know we would only be able to move appt later to 830. If she would like me to do so call back, direct #. If not dont call and we will leave at original time of 800am

## 2021-04-30 ENCOUNTER — HOSPITAL ENCOUNTER (OUTPATIENT)
Dept: ONCOLOGY | Facility: HOSPITAL | Age: 56
Setting detail: INFUSION SERIES
Discharge: HOME | End: 2021-04-30

## 2021-04-30 VITALS
HEIGHT: 68 IN | BODY MASS INDEX: 32.86 KG/M2 | HEART RATE: 84 BPM | DIASTOLIC BLOOD PRESSURE: 88 MMHG | RESPIRATION RATE: 18 BRPM | SYSTOLIC BLOOD PRESSURE: 140 MMHG | WEIGHT: 216.8 LBS | TEMPERATURE: 97.3 F

## 2021-04-30 DIAGNOSIS — T45.1X5A CHEMOTHERAPY INDUCED NEUTROPENIA (HCC): ICD-10-CM

## 2021-04-30 DIAGNOSIS — C54.1 MALIGNANT NEOPLASM OF ENDOMETRIUM (HCC): Primary | ICD-10-CM

## 2021-04-30 DIAGNOSIS — D70.1 CHEMOTHERAPY INDUCED NEUTROPENIA (HCC): ICD-10-CM

## 2021-04-30 LAB
ALBUMIN SERPL-MCNC: 3.6 G/DL (ref 3.5–5.2)
ALBUMIN/GLOB SERPL: 1.3 G/DL
ALP SERPL-CCNC: 74 U/L (ref 39–117)
ALT SERPL W P-5'-P-CCNC: 13 U/L (ref 1–33)
ANION GAP SERPL CALCULATED.3IONS-SCNC: 7 MMOL/L (ref 5–15)
AST SERPL-CCNC: 13 U/L (ref 1–32)
BASOPHILS # BLD AUTO: 0.04 10*3/MM3 (ref 0–0.2)
BASOPHILS NFR BLD AUTO: 0.6 % (ref 0–1.5)
BILIRUB SERPL-MCNC: 0.2 MG/DL (ref 0–1.2)
BUN SERPL-MCNC: 16 MG/DL (ref 6–20)
BUN/CREAT SERPL: 22.9 (ref 7–25)
CALCIUM SPEC-SCNC: 8.7 MG/DL (ref 8.6–10.5)
CHLORIDE SERPL-SCNC: 109 MMOL/L (ref 98–107)
CO2 SERPL-SCNC: 27 MMOL/L (ref 22–29)
CREAT BLDA-MCNC: 0.7 MG/DL (ref 0.6–1.3)
CREAT SERPL-MCNC: 0.7 MG/DL (ref 0.57–1)
DEPRECATED RDW RBC AUTO: 56.6 FL (ref 37–54)
EOSINOPHIL # BLD AUTO: 0.25 10*3/MM3 (ref 0–0.4)
EOSINOPHIL NFR BLD AUTO: 3.8 % (ref 0.3–6.2)
ERYTHROCYTE [DISTWIDTH] IN BLOOD BY AUTOMATED COUNT: 15.7 % (ref 12.3–15.4)
GFR SERPL CREATININE-BSD FRML MDRD: 86 ML/MIN/1.73
GLOBULIN UR ELPH-MCNC: 2.8 GM/DL
GLUCOSE SERPL-MCNC: 100 MG/DL (ref 65–99)
HCT VFR BLD AUTO: 37.4 % (ref 34–46.6)
HGB BLD-MCNC: 11.9 G/DL (ref 12–15.9)
LYMPHOCYTES # BLD AUTO: 1.57 10*3/MM3 (ref 0.7–3.1)
LYMPHOCYTES NFR BLD AUTO: 24.1 % (ref 19.6–45.3)
MCH RBC QN AUTO: 32.2 PG (ref 26.6–33)
MCHC RBC AUTO-ENTMCNC: 31.8 G/DL (ref 31.5–35.7)
MCV RBC AUTO: 101.1 FL (ref 79–97)
MONOCYTES # BLD AUTO: 0.46 10*3/MM3 (ref 0.1–0.9)
MONOCYTES NFR BLD AUTO: 7.1 % (ref 5–12)
NEUTROPHILS NFR BLD AUTO: 4.19 10*3/MM3 (ref 1.7–7)
NEUTROPHILS NFR BLD AUTO: 64.4 % (ref 42.7–76)
PLATELET # BLD AUTO: 172 10*3/MM3 (ref 140–450)
PMV BLD AUTO: 9.8 FL (ref 6–12)
POTASSIUM SERPL-SCNC: 3.8 MMOL/L (ref 3.5–5.2)
PROT SERPL-MCNC: 6.4 G/DL (ref 6–8.5)
RBC # BLD AUTO: 3.7 10*6/MM3 (ref 3.77–5.28)
SODIUM SERPL-SCNC: 143 MMOL/L (ref 136–145)
WBC # BLD AUTO: 6.51 10*3/MM3 (ref 3.4–10.8)

## 2021-04-30 PROCEDURE — 25010000002 HEPARIN LOCK FLUSH PER 10 UNITS: Performed by: INTERNAL MEDICINE

## 2021-04-30 PROCEDURE — 96375 TX/PRO/DX INJ NEW DRUG ADDON: CPT

## 2021-04-30 PROCEDURE — 96417 CHEMO IV INFUS EACH ADDL SEQ: CPT

## 2021-04-30 PROCEDURE — 25010000002 PALONOSETRON 0.25 MG/5ML SOLUTION PREFILLED SYRINGE: Performed by: INTERNAL MEDICINE

## 2021-04-30 PROCEDURE — 96415 CHEMO IV INFUSION ADDL HR: CPT

## 2021-04-30 PROCEDURE — 25010000002 PEGFILGRASTIM 6 MG/0.6ML PREFILLED SYRINGE KIT: Performed by: INTERNAL MEDICINE

## 2021-04-30 PROCEDURE — 96367 TX/PROPH/DG ADDL SEQ IV INF: CPT

## 2021-04-30 PROCEDURE — 25010000002 PACLITAXEL PER 1 MG: Performed by: INTERNAL MEDICINE

## 2021-04-30 PROCEDURE — 96413 CHEMO IV INFUSION 1 HR: CPT

## 2021-04-30 PROCEDURE — 25010000002 CARBOPLATIN PER 50 MG: Performed by: INTERNAL MEDICINE

## 2021-04-30 PROCEDURE — 25010000002 FOSAPREPITANT PER 1 MG: Performed by: INTERNAL MEDICINE

## 2021-04-30 PROCEDURE — 82565 ASSAY OF CREATININE: CPT

## 2021-04-30 PROCEDURE — 25010000002 DEXAMETHASONE SODIUM PHOSPHATE 120 MG/30ML SOLUTION: Performed by: INTERNAL MEDICINE

## 2021-04-30 PROCEDURE — 80053 COMPREHEN METABOLIC PANEL: CPT | Performed by: INTERNAL MEDICINE

## 2021-04-30 PROCEDURE — 85025 COMPLETE CBC W/AUTO DIFF WBC: CPT | Performed by: INTERNAL MEDICINE

## 2021-04-30 PROCEDURE — 25010000002 DIPHENHYDRAMINE PER 50 MG: Performed by: INTERNAL MEDICINE

## 2021-04-30 PROCEDURE — 36591 DRAW BLOOD OFF VENOUS DEVICE: CPT

## 2021-04-30 PROCEDURE — 96365 THER/PROPH/DIAG IV INF INIT: CPT

## 2021-04-30 PROCEDURE — 96377 APPLICATON ON-BODY INJECTOR: CPT

## 2021-04-30 RX ORDER — DIPHENHYDRAMINE HYDROCHLORIDE 50 MG/ML
50 INJECTION INTRAMUSCULAR; INTRAVENOUS AS NEEDED
Status: CANCELLED | OUTPATIENT
Start: 2021-04-30

## 2021-04-30 RX ORDER — SODIUM CHLORIDE 0.9 % (FLUSH) 0.9 %
10 SYRINGE (ML) INJECTION AS NEEDED
Status: CANCELLED | OUTPATIENT
Start: 2021-04-30

## 2021-04-30 RX ORDER — SODIUM CHLORIDE 0.9 % (FLUSH) 0.9 %
10 SYRINGE (ML) INJECTION AS NEEDED
Status: DISCONTINUED | OUTPATIENT
Start: 2021-04-30 | End: 2021-05-01 | Stop reason: HOSPADM

## 2021-04-30 RX ORDER — FAMOTIDINE 10 MG/ML
20 INJECTION, SOLUTION INTRAVENOUS ONCE
Status: COMPLETED | OUTPATIENT
Start: 2021-04-30 | End: 2021-04-30

## 2021-04-30 RX ORDER — FAMOTIDINE 10 MG/ML
20 INJECTION, SOLUTION INTRAVENOUS AS NEEDED
Status: CANCELLED | OUTPATIENT
Start: 2021-04-30

## 2021-04-30 RX ORDER — HEPARIN SODIUM (PORCINE) LOCK FLUSH IV SOLN 100 UNIT/ML 100 UNIT/ML
500 SOLUTION INTRAVENOUS AS NEEDED
Status: CANCELLED | OUTPATIENT
Start: 2021-04-30

## 2021-04-30 RX ORDER — PALONOSETRON 0.05 MG/ML
0.25 INJECTION, SOLUTION INTRAVENOUS ONCE
Status: COMPLETED | OUTPATIENT
Start: 2021-04-30 | End: 2021-04-30

## 2021-04-30 RX ORDER — SODIUM CHLORIDE 9 MG/ML
250 INJECTION, SOLUTION INTRAVENOUS ONCE
Status: COMPLETED | OUTPATIENT
Start: 2021-04-30 | End: 2021-04-30

## 2021-04-30 RX ORDER — HEPARIN SODIUM (PORCINE) LOCK FLUSH IV SOLN 100 UNIT/ML 100 UNIT/ML
500 SOLUTION INTRAVENOUS AS NEEDED
Status: DISCONTINUED | OUTPATIENT
Start: 2021-04-30 | End: 2021-05-01 | Stop reason: HOSPADM

## 2021-04-30 RX ADMIN — Medication 10 ML: at 16:04

## 2021-04-30 RX ADMIN — CARBOPLATIN 750 MG: 10 INJECTION, SOLUTION INTRAVENOUS at 15:19

## 2021-04-30 RX ADMIN — FAMOTIDINE 20 MG: 10 INJECTION, SOLUTION INTRAVENOUS at 09:32

## 2021-04-30 RX ADMIN — DIPHENHYDRAMINE HYDROCHLORIDE 50 MG: 50 INJECTION, SOLUTION INTRAMUSCULAR; INTRAVENOUS at 09:52

## 2021-04-30 RX ADMIN — HEPARIN 500 UNITS: 100 SYRINGE at 16:04

## 2021-04-30 RX ADMIN — SODIUM CHLORIDE 250 ML: 9 INJECTION, SOLUTION INTRAVENOUS at 09:30

## 2021-04-30 RX ADMIN — SODIUM CHLORIDE 100 ML: 9 INJECTION, SOLUTION INTRAVENOUS at 10:09

## 2021-04-30 RX ADMIN — PEGFILGRASTIM 6 MG: KIT SUBCUTANEOUS at 16:06

## 2021-04-30 RX ADMIN — PACLITAXEL 275 MG: 6 INJECTION, SOLUTION INTRAVENOUS at 11:20

## 2021-04-30 RX ADMIN — PALONOSETRON 0.25 MG: 0.25 INJECTION, SOLUTION INTRAVENOUS at 10:09

## 2021-04-30 RX ADMIN — DEXAMETHASONE SODIUM PHOSPHATE 20 MG: 4 INJECTION, SOLUTION INTRA-ARTICULAR; INTRALESIONAL; INTRAMUSCULAR; INTRAVENOUS; SOFT TISSUE at 09:34

## 2021-04-30 NOTE — PROGRESS NOTES
"Pt's daughter came out of room and stated that pt feels like her port needle has come out.  Assessed pt's port and needle had come out.  Pt was receiving Taxol at that time and it was stopped.  Needle was removed.  Pt only had a small spot of fluid on her chest.  Pt's chest was washed with soap and water.  No pain, redness or swelling noted.  Eladia Maria, charge nurse also assessed pt and spoke w/ Dr Neal, order received to re access pt's port and continue Taxol.  Pt's port re accessed w/ a 20g 1\" needle instead of a 3/4\" needle.  Positive blood return noted.  Taxol restarted.  Taxol and Carboplatin completed w/ no further issues.  Port flushed w/ normal saline and heparin and needle removed.  Site assessed and no redness or swelling noted.  Pt states that she has no pain at this time.  Spoke w/ Kurt Vickersoley pharmacist and instructions received to have pt place cold compress for 15-20 minutes every six hours for 48 hours if she notices any redness or swelling or pain.  Pt and her daughter were instructed on this and v/u.  Instructed pt to call w/ any issues and she v/u.  Pt given AVS and d/c'd from clinic.  "

## 2021-05-04 ENCOUNTER — TELEPHONE (OUTPATIENT)
Dept: ONCOLOGY | Facility: HOSPITAL | Age: 56
End: 2021-05-04

## 2021-05-04 NOTE — TELEPHONE ENCOUNTER
Pt called stating that the Neulasta on-body did not auto inject while on her arm this weekend.  She states that she was told that the auto-injector should be completed by 5pm on Saturday May 1st.  She waited until 7pm and removed the on-body device which at that point started to auto-inject  in the trash can.  She did not call the office about what had happened until today.  She wanted to see if it was okay to get treatment this week.  According to the care plan she is not due for chemo until 5/21/21 however she needs to have a CBC checked on Friday.  I made the apt for 1pm and notified the pt.  She also states that the neuropathy in her feet is bothering her today.  Dr. Dubose is adjusting her pain medication and gabapentin.  I advised her to contact there office to for an increase of medication.  I advised her to contact our office if she develops any symptoms that indicate an infection such as a fever. She v/u.

## 2021-05-07 ENCOUNTER — TELEPHONE (OUTPATIENT)
Dept: ONCOLOGY | Facility: CLINIC | Age: 56
End: 2021-05-07

## 2021-05-07 NOTE — TELEPHONE ENCOUNTER
Received call from patient stating she needs to cancel today's CBC appt and reschedule.  Chart reviewed.  Patient does not have f/u appt scheduled.  MD f/u and labs scheduled for Friday 5-.  Patient v/u.

## 2021-05-10 ENCOUNTER — TELEPHONE (OUTPATIENT)
Dept: ONCOLOGY | Facility: CLINIC | Age: 56
End: 2021-05-10

## 2021-05-10 ENCOUNTER — TELEPHONE (OUTPATIENT)
Dept: RADIATION ONCOLOGY | Facility: HOSPITAL | Age: 56
End: 2021-05-10

## 2021-05-10 NOTE — TELEPHONE ENCOUNTER
Called patient to let her know that she could come up for her appointment with Dr. Neal after her appointment with Dr. Glover which has been scheduled for 10:00 on 5/14/21.  No answer and mailbox was full.

## 2021-05-10 NOTE — TELEPHONE ENCOUNTER
Caller: MICAELA     Relationship to patient:     Best call back number: [phone]    Chief complaint:     Type of visit: CONSULT     Requested date: PT HAS APPT WITH DR. DICKEY ON Friday. ANY WAY TO WORK AROUND THAT APPT FOR DR. ESTRADA     If rescheduling, when is the original appointment:     Additional notes      CALLING MELI BACK TO SCHEDULE DR. ESTRADA CONSULT

## 2021-05-11 NOTE — TELEPHONE ENCOUNTER
Attempted to call patient again to discuss appointments.  No answer and mailbox was full and unable to leave a message.  Called alternate number.  Number not in service.  Called patient's emergency contact, her daughter.  No answer.  Message left for return call.

## 2021-05-12 ENCOUNTER — TELEPHONE (OUTPATIENT)
Dept: RADIATION ONCOLOGY | Facility: HOSPITAL | Age: 56
End: 2021-05-12

## 2021-05-19 ENCOUNTER — HOSPITAL ENCOUNTER (OUTPATIENT)
Dept: RADIATION ONCOLOGY | Facility: HOSPITAL | Age: 56
Setting detail: RADIATION/ONCOLOGY SERIES
End: 2021-05-19

## 2021-05-19 ENCOUNTER — CONSULT (OUTPATIENT)
Dept: RADIATION ONCOLOGY | Facility: HOSPITAL | Age: 56
End: 2021-05-19

## 2021-05-19 VITALS
BODY MASS INDEX: 33.68 KG/M2 | HEART RATE: 81 BPM | DIASTOLIC BLOOD PRESSURE: 85 MMHG | OXYGEN SATURATION: 99 % | HEIGHT: 68 IN | WEIGHT: 222.2 LBS | SYSTOLIC BLOOD PRESSURE: 125 MMHG | TEMPERATURE: 97.5 F

## 2021-05-19 DIAGNOSIS — C55 MALIGNANT NEOPLASM OF UTERUS, UNSPECIFIED SITE (HCC): Primary | ICD-10-CM

## 2021-05-19 PROCEDURE — 77470 SPECIAL RADIATION TREATMENT: CPT | Performed by: RADIOLOGY

## 2021-05-19 PROCEDURE — 77290 THER RAD SIMULAJ FIELD CPLX: CPT | Performed by: RADIOLOGY

## 2021-05-19 PROCEDURE — 77334 RADIATION TREATMENT AID(S): CPT | Performed by: RADIOLOGY

## 2021-05-19 PROCEDURE — 99213 OFFICE O/P EST LOW 20 MIN: CPT | Performed by: RADIOLOGY

## 2021-05-19 RX ORDER — DEXTROMETHORPHAN HYDROBROMIDE AND PROMETHAZINE HYDROCHLORIDE 15; 6.25 MG/5ML; MG/5ML
SYRUP ORAL
COMMUNITY
Start: 2021-04-14 | End: 2021-06-09

## 2021-05-19 RX ORDER — LORATADINE 10 MG/1
10 TABLET ORAL DAILY
COMMUNITY
Start: 2021-04-14 | End: 2021-11-12

## 2021-05-21 ENCOUNTER — TELEPHONE (OUTPATIENT)
Dept: ONCOLOGY | Facility: CLINIC | Age: 56
End: 2021-05-21

## 2021-05-21 ENCOUNTER — TELEPHONE (OUTPATIENT)
Dept: ONCOLOGY | Facility: HOSPITAL | Age: 56
End: 2021-05-21

## 2021-05-21 NOTE — TELEPHONE ENCOUNTER
Called pt to check on her. She's scheduled for treatment today at 0800 and hasn't yet arrived. Unable to leave message due to her voice mailbox being full.

## 2021-05-21 NOTE — TELEPHONE ENCOUNTER
Attempted to speak with patient regarding next chemo & MD f/u appt.  Patient's phone kept cutting in and out.  Patient hung up.  Tried calling back and patient did not answer.  VM not set up.  Called patient's daughter, Belle.   No answer.  LMV informing her of patient's appt dates and times.  Asked Belle to call back and confirmed that she had received the message.

## 2021-05-27 PROCEDURE — 77263 THER RADIOLOGY TX PLNG CPLX: CPT | Performed by: RADIOLOGY

## 2021-05-28 ENCOUNTER — TELEPHONE (OUTPATIENT)
Dept: ONCOLOGY | Facility: HOSPITAL | Age: 56
End: 2021-05-28

## 2021-05-28 PROCEDURE — 77300 RADIATION THERAPY DOSE PLAN: CPT | Performed by: RADIOLOGY

## 2021-05-28 PROCEDURE — 77301 RADIOTHERAPY DOSE PLAN IMRT: CPT | Performed by: RADIOLOGY

## 2021-05-28 PROCEDURE — 77338 DESIGN MLC DEVICE FOR IMRT: CPT | Performed by: RADIOLOGY

## 2021-05-28 NOTE — TELEPHONE ENCOUNTER
Patient was scheduled for 6/11 for C4 Carbo/Taxol but has finished 3 cycles and is actually supposed to be on hold until after her radiation treatments. She will then get 3 more cycles after radiation. I called patient to clarify this and cancelled the chemo appt for 6/11. She is due to see Dr. Neal so we scheduled her a follow up on 6/8 at 1315. Patient verified appt and verbalized understanding regarding her chemo/radiation.

## 2021-06-01 ENCOUNTER — HOSPITAL ENCOUNTER (OUTPATIENT)
Dept: RADIATION ONCOLOGY | Facility: HOSPITAL | Age: 56
Discharge: HOME | End: 2021-06-01

## 2021-06-01 ENCOUNTER — HOSPITAL ENCOUNTER (OUTPATIENT)
Dept: RADIATION ONCOLOGY | Facility: HOSPITAL | Age: 56
Setting detail: RADIATION/ONCOLOGY SERIES
End: 2021-06-01

## 2021-06-01 PROCEDURE — 77014 CHG CT GUIDANCE RADIATION THERAPY FLDS PLACEMENT: CPT | Performed by: RADIOLOGY

## 2021-06-01 PROCEDURE — 77386: CPT | Performed by: RADIOLOGY

## 2021-06-02 ENCOUNTER — RADIATION ONCOLOGY WEEKLY ASSESSMENT (OUTPATIENT)
Dept: RADIATION ONCOLOGY | Facility: HOSPITAL | Age: 56
End: 2021-06-02

## 2021-06-02 ENCOUNTER — HOSPITAL ENCOUNTER (OUTPATIENT)
Dept: RADIATION ONCOLOGY | Facility: HOSPITAL | Age: 56
Discharge: HOME | End: 2021-06-02

## 2021-06-02 ENCOUNTER — LAB (OUTPATIENT)
Dept: LAB | Facility: HOSPITAL | Age: 56
End: 2021-06-02

## 2021-06-02 VITALS
DIASTOLIC BLOOD PRESSURE: 93 MMHG | SYSTOLIC BLOOD PRESSURE: 137 MMHG | HEART RATE: 89 BPM | BODY MASS INDEX: 33.79 KG/M2 | TEMPERATURE: 97.5 F | HEIGHT: 68 IN | OXYGEN SATURATION: 99 %

## 2021-06-02 DIAGNOSIS — C55 MALIGNANT NEOPLASM OF UTERUS, UNSPECIFIED SITE (HCC): Primary | ICD-10-CM

## 2021-06-02 DIAGNOSIS — C55 MALIGNANT NEOPLASM OF UTERUS, UNSPECIFIED SITE (HCC): ICD-10-CM

## 2021-06-02 DIAGNOSIS — C54.1 MALIGNANT NEOPLASM OF ENDOMETRIUM (HCC): ICD-10-CM

## 2021-06-02 LAB
BACTERIA UR QL AUTO: ABNORMAL /HPF
BILIRUB UR QL STRIP: NEGATIVE
CLARITY UR: CLEAR
COLOR UR: ABNORMAL
GLUCOSE UR STRIP-MCNC: NEGATIVE MG/DL
HGB UR QL STRIP.AUTO: ABNORMAL
HYALINE CASTS UR QL AUTO: ABNORMAL /LPF
KETONES UR QL STRIP: NEGATIVE
LEUKOCYTE ESTERASE UR QL STRIP.AUTO: NEGATIVE
NITRITE UR QL STRIP: NEGATIVE
PH UR STRIP.AUTO: 6.5 [PH] (ref 5–8)
PROT UR QL STRIP: NEGATIVE
RBC # UR: ABNORMAL /HPF
REF LAB TEST METHOD: ABNORMAL
SP GR UR STRIP: 1.01 (ref 1–1.03)
SQUAMOUS #/AREA URNS HPF: ABNORMAL /HPF
UROBILINOGEN UR QL STRIP: ABNORMAL
WBC UR QL AUTO: ABNORMAL /HPF

## 2021-06-02 PROCEDURE — 81001 URINALYSIS AUTO W/SCOPE: CPT

## 2021-06-02 PROCEDURE — 77014 CHG CT GUIDANCE RADIATION THERAPY FLDS PLACEMENT: CPT | Performed by: RADIOLOGY

## 2021-06-02 PROCEDURE — 77386: CPT | Performed by: RADIOLOGY

## 2021-06-02 RX ORDER — ONDANSETRON HYDROCHLORIDE 8 MG/1
8 TABLET, FILM COATED ORAL 3 TIMES DAILY PRN
Qty: 30 TABLET | Refills: 1 | Status: SHIPPED | OUTPATIENT
Start: 2021-06-02 | End: 2021-06-29 | Stop reason: SDUPTHER

## 2021-06-02 NOTE — PROGRESS NOTES
"Patient Name: Xin Nj Date: 2021       : 1965 Physician: PCP Park Dubose MD       MRN #: [id number] Diagnosis:   Encounter Diagnosis   Name Primary?   • Malignant neoplasm of uterus, unspecified site (CMS/HCC) Yes                  RADIATION ON TREATMENT VISIT NOTE - PELVIS    Treatment Summary      Treatment Site Ref. ID Energy Dose/Fx (cGy) #Fx Dose Correction (cGy) Total Dose (cGy) Start Date End Date Elapsed Days   Pelvis Init Pelvis 6X 180  0 360 2021 1     [id number], FIGO IB, carcinosarcoma of the uterus  [place] chemo-XRT-Chemo    General:           Review of Systems    [] No new complaints [x] Nocturia 1-2 times night  [] Slow urinary stream  [x] Difficulty initiating urination [] Dysuria [] Vaginal discharge  [x] Increased frequency of urination [] Soft/loose stool [x] Diarrhea 2 times a day   [] Rectal burning  [x] Skin soreness, location:   [x] Fatigue,  severity: 1 Relief w/ Rest  [x] Pain,  severity: 5, location:  abdomen  Bladder medication regimen: NONE   Pain medication regimen: Hydrocodone   Bowel regimen: NONE instructed to get some imodium AD  Skin regimen: NONE     Comments/Notes:  + slow stream, increased frequency and increased fatigue. No blood in urine.  Looser stools and some cramping yesterday    KPS: 70%       Vital Signs: /93   Pulse 89   Temp 97.5 °F (36.4 °C) (Infrared)   Ht 172.7 cm (68\")   SpO2 99%   BMI 33.79 kg/m²     Weight:   Wt Readings from Last 3 Encounters:   21 101 kg (222 lb 3.2 oz)   21 98.3 kg (216 lb 12.8 oz)   21 98.4 kg (217 lb)       Medication:   Current Outpatient Medications:   •  albuterol sulfate  (90 Base) MCG/ACT inhaler, inhale 1 PUFF every 4 HOURS as needed, Disp: , Rfl:   •  ALPRAZolam XR 0.5 MG 24 hr tablet, TAKE ONE TABLET BY MOUTH EVERY MORNING AS NEEDED, Disp: , Rfl:   •  budesonide-formoterol (SYMBICORT) 160-4.5 MCG/ACT inhaler, Inhale 2 puffs 2 (Two) Times a Day., Disp: , " Rfl:   •  calcium carbonate-vitamin d 600-400 MG-UNIT per tablet, calcium carbonate 600 mg (1,500 mg)-vitamin D3 400 unit tablet  TAKE 1 TABLET BY MOUTH EVERY DAY WITH FOOD, Disp: , Rfl:   •  cholecalciferol (VITAMIN D3) 25 MCG (1000 UT) tablet, Take 1,000 Units by mouth Daily., Disp: , Rfl:   •  cloNIDine (CATAPRES) 0.2 MG tablet, clonidine HCl 0.2 mg tablet, Disp: , Rfl:   •  diclofenac sodium (VOTAREN XR) 100 MG 24 hr tablet, diclofenac  mg tablet,extended release 24 hr, Disp: , Rfl:   •  dicyclomine (BENTYL) 20 MG tablet, Take 20 mg by mouth 3 (Three) Times a Day., Disp: , Rfl:   •  DULoxetine (CYMBALTA) 60 MG capsule, Take 60 mg by mouth Daily., Disp: , Rfl:   •  gabapentin (NEURONTIN) 600 MG tablet, Take 600 mg by mouth 3 (Three) Times a Day., Disp: , Rfl:   •  gabapentin (NEURONTIN) 800 MG tablet, Take 800 mg by mouth 3 (Three) Times a Day., Disp: , Rfl:   •  HYDROcodone-acetaminophen (NORCO)  MG per tablet, TAKE ONE TABLET BY MOUTH EVERY 8 HOURS AS NEEDED FOR 7 DAYS, Disp: , Rfl:   •  lidocaine-prilocaine (EMLA) 2.5-2.5 % cream, Apply  topically to the appropriate area as directed As Needed (prior to accessing port)., Disp: 30 g, Rfl: 2  •  loratadine (CLARITIN) 10 MG tablet, Take 10 mg by mouth Daily., Disp: , Rfl:   •  omeprazole (priLOSEC) 40 MG capsule, Take 1 capsule by mouth Daily., Disp: 30 capsule, Rfl: 6  •  sennosides-docusate (PERICOLACE) 8.6-50 MG per tablet, , Disp: , Rfl:   •  TiZANidine (ZANAFLEX) 4 MG capsule, Take 4 mg by mouth 3 (Three) Times a Day As Needed., Disp: , Rfl:   •  vitamin B-6 (PYRIDOXINE) 100 MG tablet, Take 1 tablet by mouth 2 (two) times a day., Disp: 60 tablet, Rfl: 5  •  dexamethasone (DECADRON) 4 MG tablet, Take 2 tablets in the morning daily on days 2, 3 & 4.  Take with food., Disp: 6 tablet, Rfl: 5  •  ondansetron (ZOFRAN) 8 MG tablet, Take 1 tablet by mouth 3 (Three) Times a Day As Needed for Nausea or Vomiting., Disp: 30 tablet, Rfl: 5  •  promethazine  (PHENERGAN) 12.5 MG tablet, Take 12.5 mg by mouth Every 12 (Twelve) Hours As Needed., Disp: , Rfl:   •  promethazine-dextromethorphan (PROMETHAZINE-DM) 6.25-15 MG/5ML syrup, TAKE 1 TEASPOONFUL BY MOUTH EVERY 6 HOURS AS NEEDED, Disp: , Rfl:        LABS (Reviewed):  Hematology WBC   Date Value Ref Range Status   04/30/2021 6.51 3.40 - 10.80 10*3/mm3 Final     RBC   Date Value Ref Range Status   04/30/2021 3.70 (L) 3.77 - 5.28 10*6/mm3 Final     Hemoglobin   Date Value Ref Range Status   04/30/2021 11.9 (L) 12.0 - 15.9 g/dL Final     Hematocrit   Date Value Ref Range Status   04/30/2021 37.4 34.0 - 46.6 % Final     Platelets   Date Value Ref Range Status   04/30/2021 172 140 - 450 10*3/mm3 Final      Chemistry   Lab Results   Component Value Date    GLUCOSE 100 (H) 04/30/2021    BUN 16 04/30/2021    CREATININE 0.70 04/30/2021    EGFRIFNONA 86 04/30/2021    BCR 22.9 04/30/2021    K 3.8 04/30/2021    CO2 27.0 04/30/2021    CALCIUM 8.7 04/30/2021    ALBUMIN 3.60 04/30/2021    AST 13 04/30/2021    ALT 13 04/30/2021         Physical Exam:         Abdomen:  [x] Soft   [x] Bowel sounds  No flank or suprapubic tenderness  CTAB  RRR    Extremities:  [] Edema  GI:   [] Diarrhea  [] Enteritis     [] Proctitis  [] Vomiting  Renal/Genitourinary: [] Bladder spasms [] Cystitis     [] Incontinence  Skin:   [x] stGstrstastdstest:st st1st Comments/Notes:     [x] Review of labs, images, dosimetry, dose delivered, & treatment parameters.    Comments:     [x] Patient treatment setup reviewed.    Comments:     Recommendations: UA and culture  Zofran  Imodium      [x] Continue RT  [] Change RT Plan [] Hold RT, length:        Approved Electronically By:  Kristofer Glover MD, 6/2/2021, 14:26 EDT          Confidentiality of this medical record shall be maintained except when use or disclosure is required or permitted by law, regulation or written authorization by the patient.

## 2021-06-03 ENCOUNTER — HOSPITAL ENCOUNTER (OUTPATIENT)
Dept: RADIATION ONCOLOGY | Facility: HOSPITAL | Age: 56
Discharge: HOME | End: 2021-06-03

## 2021-06-03 PROCEDURE — 77386: CPT | Performed by: RADIOLOGY

## 2021-06-03 PROCEDURE — 77014 CHG CT GUIDANCE RADIATION THERAPY FLDS PLACEMENT: CPT | Performed by: RADIOLOGY

## 2021-06-03 PROCEDURE — 77336 RADIATION PHYSICS CONSULT: CPT | Performed by: RADIOLOGY

## 2021-06-04 ENCOUNTER — HOSPITAL ENCOUNTER (OUTPATIENT)
Dept: RADIATION ONCOLOGY | Facility: HOSPITAL | Age: 56
Discharge: HOME | End: 2021-06-04

## 2021-06-04 PROCEDURE — 77386: CPT | Performed by: RADIOLOGY

## 2021-06-04 PROCEDURE — 77014 CHG CT GUIDANCE RADIATION THERAPY FLDS PLACEMENT: CPT | Performed by: RADIOLOGY

## 2021-06-07 ENCOUNTER — HOSPITAL ENCOUNTER (OUTPATIENT)
Dept: RADIATION ONCOLOGY | Facility: HOSPITAL | Age: 56
Discharge: HOME | End: 2021-06-07

## 2021-06-07 PROCEDURE — 77386: CPT | Performed by: RADIOLOGY

## 2021-06-07 PROCEDURE — 77014 CHG CT GUIDANCE RADIATION THERAPY FLDS PLACEMENT: CPT | Performed by: RADIOLOGY

## 2021-06-07 NOTE — PROGRESS NOTES
Hematology/Oncology Outpatient Follow Up    PATIENT NAME:Xin Nj  :1965  MRN: [id number]  PRIMARY CARE PHYSICIAN: Park Dubose MD  REFERRING PHYSICIAN: Park Dubose,*    Chief Complaint   Patient presents with   • Follow-up     Malignant neoplasm of endometrium (CMS/HCC)        HISTORY OF PRESENT ILLNESS:     This is a 55-year-old female who developed postmenopausal vaginal bleeding for approximately one and half years.  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  On 2020-10-31 patient had CT scan of the abdomen and pelvis with contrast there were no acute findings in the abdomen or pelvis.  Stable hypodense lesion in the liver   Patient was then referred to Dr. Jama with [facility] and on 11/10/2020 patient underwent   total robotic hysterectomy, bilateral salpingo-oophorectomy and sentinel lymphadenectomy.  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  There was no significant pathology identified in the right or left ovaries.  One left pelvic sentinel lymph node was benign for malignancy.  On the right pelvic  7 benign lymph nodes we are removed.  The mass measured 7 cm in size.  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  There was evidence of lymphovascular invasion.  Pathology stage is pT1bN0 FIGO 1B     Dr. Jama and team have recommended adjuvant chemotherapy with carboplatinum and Taxol sandwiched with external beam radiation and vaginal cuff brachytherapy.  Total of 6 cycles of chemotherapy with carboplatinum and Taxol has been recommended with 3 cycles prior to radiation and subsequent 3 cycles after radiation in a sandwich fashion.     Patient has a history of right breast cancer that was diagnosed in .  She underwent right lumpectomy followed by chemotherapy for 4  cycles.  Adjuvant radiation was recommended but she declined radiation treatments.  Patient states that her tumor was infiltrated on her right chest wall and for that reason she aborted all future treatments.  She has been without any evidence of relapsed disease.  Patient still has her port from 2003 which has not been flushed or cared for since then.     She is accompanied today by 2 of her daughters one by phone for this visit      · 2/23/2021 patient was initiated on adjuvant chemotherapy with carboplatinum and Taxol  · 3/18/2021: Patient had cycle 2 chemotherapy with carboplatinum only.  Taxol was held  · Taxol was held with second cycle of chemotherapy due to significant neuropathy.  This has now improved.  She is currently on Neurontin 600 mg 3 times a day.  We discussed that neuropathy is irreversible  · 4/30/2021 patient received cycle 3 of chemotherapy with carboplatinum and Taxol  · Patient started adjuvant pelvic radiation 2 weeks ago  Past Medical History:   Diagnosis Date   • Anxiety    • COPD (chronic obstructive pulmonary disease) (CMS/HCC)    • COPD (chronic obstructive pulmonary disease) (CMS/HCC)    • Depression    • Encounter for antineoplastic radiation therapy    • Endometrial cancer (CMS/HCC)    • History of right breast cancer    • Osteoporosis        Past Surgical History:   Procedure Laterality Date   • BREAST LUMPECTOMY Right    • HYSTERECTOMY  2020         Current Outpatient Medications:   •  albuterol sulfate HFA (Ventolin HFA) 108 (90 Base) MCG/ACT inhaler, Every 4 (Four) Hours., Disp: , Rfl:   •  albuterol sulfate  (90 Base) MCG/ACT inhaler, inhale 1 PUFF every 4 HOURS as needed, Disp: , Rfl:   •  ALPRAZolam XR 0.5 MG 24 hr tablet, TAKE ONE TABLET BY MOUTH EVERY MORNING AS NEEDED, Disp: , Rfl:   •  budesonide-formoterol (SYMBICORT) 160-4.5 MCG/ACT inhaler, Inhale 2 puffs 2 (Two) Times a Day., Disp: , Rfl:   •  budesonide-formoterol (Symbicort) 160-4.5 MCG/ACT inhaler, Every 12  (Twelve) Hours., Disp: , Rfl:   •  calcium carbonate-vitamin d 600-400 MG-UNIT per tablet, calcium carbonate 600 mg (1,500 mg)-vitamin D3 400 unit tablet  TAKE 1 TABLET BY MOUTH EVERY DAY WITH FOOD, Disp: , Rfl:   •  cholecalciferol (VITAMIN D3) 25 MCG (1000 UT) tablet, Take 1,000 Units by mouth Daily., Disp: , Rfl:   •  cloNIDine (CATAPRES) 0.2 MG tablet, clonidine HCl 0.2 mg tablet, Disp: , Rfl:   •  dexamethasone (DECADRON) 4 MG tablet, Take 2 tablets in the morning daily on days 2, 3 & 4.  Take with food., Disp: 6 tablet, Rfl: 5  •  diclofenac sodium (VOTAREN XR) 100 MG 24 hr tablet, diclofenac  mg tablet,extended release 24 hr, Disp: , Rfl:   •  dicyclomine (BENTYL) 20 MG tablet, Take 20 mg by mouth 3 (Three) Times a Day., Disp: , Rfl:   •  DULoxetine (CYMBALTA) 60 MG capsule, Take 60 mg by mouth Daily., Disp: , Rfl:   •  fluticasone-salmeterol (Advair HFA) 115-21 MCG/ACT inhaler, Every 12 (Twelve) Hours., Disp: , Rfl:   •  gabapentin (NEURONTIN) 600 MG tablet, Take 600 mg by mouth 3 (Three) Times a Day., Disp: , Rfl:   •  gabapentin (NEURONTIN) 800 MG tablet, Take 800 mg by mouth 3 (Three) Times a Day., Disp: , Rfl:   •  HYDROcodone-acetaminophen (NORCO)  MG per tablet, TAKE ONE TABLET BY MOUTH EVERY 8 HOURS AS NEEDED FOR 7 DAYS, Disp: , Rfl:   •  lidocaine-prilocaine (EMLA) 2.5-2.5 % cream, Apply  topically to the appropriate area as directed As Needed (prior to accessing port)., Disp: 30 g, Rfl: 2  •  loratadine (CLARITIN) 10 MG tablet, Take 10 mg by mouth Daily., Disp: , Rfl:   •  omeprazole (priLOSEC) 40 MG capsule, Take 1 capsule by mouth Daily., Disp: 30 capsule, Rfl: 6  •  ondansetron (ZOFRAN) 8 MG tablet, Take 1 tablet by mouth 3 (Three) Times a Day As Needed for Nausea or Vomiting., Disp: 30 tablet, Rfl: 1  •  promethazine (PHENERGAN) 12.5 MG tablet, Take 12.5 mg by mouth Every 12 (Twelve) Hours As Needed., Disp: , Rfl:   •  promethazine-dextromethorphan (PROMETHAZINE-DM) 6.25-15 MG/5ML  syrup, TAKE 1 TEASPOONFUL BY MOUTH EVERY 6 HOURS AS NEEDED, Disp: , Rfl:   •  sennosides-docusate (PERICOLACE) 8.6-50 MG per tablet, , Disp: , Rfl:   •  TiZANidine (ZANAFLEX) 4 MG capsule, Take 4 mg by mouth 3 (Three) Times a Day As Needed., Disp: , Rfl:   •  vitamin B-6 (PYRIDOXINE) 100 MG tablet, Take 1 tablet by mouth 2 (two) times a day., Disp: 60 tablet, Rfl: 5    No Known Allergies    Family History   Problem Relation Age of Onset   • Stroke Mother    • Lung cancer Brother        Cancer-related family history includes Lung cancer in her brother.    Social History     Tobacco Use   • Smoking status: Current Every Day Smoker   • Smokeless tobacco: Never Used   • Tobacco comment: down to 1 ppd   Vaping Use   • Vaping Use: Never used   Substance Use Topics   • Alcohol use: Not Currently   • Drug use: Never       HPI, ROS and PFSH have been reviewed and confirmed on 6/8/2021.     SUBJECTIVE:    She is here today for follow-up.  Her neuropathy has improved.  Complains of fatigue        REVIEW OF SYSTEMS:  Review of Systems   Constitutional: Negative for chills and fever.   HENT: Negative for ear pain, mouth sores, nosebleeds and sore throat.    Eyes: Negative for photophobia and visual disturbance.   Respiratory: Negative for wheezing and stridor.    Cardiovascular: Negative for chest pain and palpitations.   Gastrointestinal: Negative for abdominal pain, diarrhea, nausea and vomiting.   Endocrine: Negative for cold intolerance and heat intolerance.   Genitourinary: Negative for dysuria and hematuria.   Musculoskeletal: Negative for joint swelling and neck stiffness.   Skin: Negative for color change and rash.   Neurological: Negative for seizures and syncope.   Hematological: Negative for adenopathy.        No obvious bleeding   Psychiatric/Behavioral: Negative for agitation, confusion and hallucinations.       OBJECTIVE:    Vitals:    06/08/21 1342   BP: 119/87   Pulse: 85   Temp: 97.9 °F (36.6 °C)   Weight: 98.5  kg (217 lb 3.2 oz)   PainSc:   6   PainLoc: Back     Body mass index is 33.03 kg/m².    ECOG  (0) Fully active, able to carry on all predisease performance without restriction    Physical Exam  Vitals and nursing note reviewed.   Constitutional:       General: She is not in acute distress.     Appearance: She is not diaphoretic.   HENT:      Head: Normocephalic and atraumatic.   Eyes:      General: No scleral icterus.        Right eye: No discharge.         Left eye: No discharge.      Conjunctiva/sclera: Conjunctivae normal.   Neck:      Thyroid: No thyromegaly.   Cardiovascular:      Rate and Rhythm: Normal rate and regular rhythm.      Heart sounds: Normal heart sounds. No friction rub. No gallop.    Pulmonary:      Effort: Pulmonary effort is normal. No respiratory distress.      Breath sounds: No stridor. No wheezing.   Abdominal:      General: Bowel sounds are normal.      Palpations: Abdomen is soft. There is no mass.      Tenderness: There is no abdominal tenderness. There is no guarding or rebound.   Musculoskeletal:         General: No tenderness. Normal range of motion.      Cervical back: Normal range of motion and neck supple.   Lymphadenopathy:      Cervical: No cervical adenopathy.   Skin:     General: Skin is warm.      Findings: No erythema or rash.   Neurological:      Mental Status: She is alert and oriented to person, place, and time.      Motor: No abnormal muscle tone.   Psychiatric:         Behavior: Behavior normal.     I have reexamined the patient and the results are consistent with the previously documented exam. Juliana Neal MD     RECENT LABS  WBC   Date Value Ref Range Status   06/08/2021 5.41 3.40 - 10.80 10*3/mm3 Final     RBC   Date Value Ref Range Status   06/08/2021 3.59 (L) 3.77 - 5.28 10*6/mm3 Final     Hemoglobin   Date Value Ref Range Status   06/08/2021 12.7 12.0 - 15.9 g/dL Final     Hematocrit   Date Value Ref Range Status   06/08/2021 36.4 34.0 - 46.6 % Final      MCV   Date Value Ref Range Status   06/08/2021 101.4 (H) 79.0 - 97.0 fL Final     MCH   Date Value Ref Range Status   06/08/2021 35.4 (H) 26.6 - 33.0 pg Final     MCHC   Date Value Ref Range Status   06/08/2021 34.9 31.5 - 35.7 g/dL Final     RDW   Date Value Ref Range Status   06/08/2021 14.1 12.3 - 15.4 % Final     RDW-SD   Date Value Ref Range Status   06/08/2021 52.2 37.0 - 54.0 fl Final     MPV   Date Value Ref Range Status   06/08/2021 8.7 6.0 - 12.0 fL Final     Platelets   Date Value Ref Range Status   06/08/2021 194 140 - 450 10*3/mm3 Final     Neutrophil %   Date Value Ref Range Status   06/08/2021 68.3 42.7 - 76.0 % Final     Lymphocyte %   Date Value Ref Range Status   06/08/2021 23.7 19.6 - 45.3 % Final     Monocyte %   Date Value Ref Range Status   06/08/2021 5.9 5.0 - 12.0 % Final     Eosinophil %   Date Value Ref Range Status   06/08/2021 1.7 0.3 - 6.2 % Final     Basophil %   Date Value Ref Range Status   06/08/2021 0.4 0.0 - 1.5 % Final     Neutrophils, Absolute   Date Value Ref Range Status   06/08/2021 3.70 1.70 - 7.00 10*3/mm3 Final     Lymphocytes, Absolute   Date Value Ref Range Status   06/08/2021 1.28 0.70 - 3.10 10*3/mm3 Final     Monocytes, Absolute   Date Value Ref Range Status   06/08/2021 0.32 0.10 - 0.90 10*3/mm3 Final     Eosinophils, Absolute   Date Value Ref Range Status   06/08/2021 0.09 0.00 - 0.40 10*3/mm3 Final     Basophils, Absolute   Date Value Ref Range Status   06/08/2021 0.02 0.00 - 0.20 10*3/mm3 Final     nRBC   Date Value Ref Range Status   03/31/2021 0.0 0.0 - 0.2 /100 WBC Final       Lab Results   Component Value Date    GLUCOSE 100 (H) 04/30/2021    BUN 16 04/30/2021    CREATININE 0.70 04/30/2021    EGFRIFNONA 86 04/30/2021    BCR 22.9 04/30/2021    K 3.8 04/30/2021    CO2 27.0 04/30/2021    CALCIUM 8.7 04/30/2021    ALBUMIN 3.60 04/30/2021    AST 13 04/30/2021    ALT 13 04/30/2021         Assessment/Plan     Malignant neoplasm of endometrium (CMS/HCC)  - CBC &  Differential    Thrombocytopenia (CMS/HCC)  - CBC & Differential      ASSESSMENT:       Malignant neoplasm of uterus, unspecified site (CMS/HCC)  - CBC & Differential  - Comprehensive Metabolic Panel  -   - IR Port Placement  - Ambulatory Referral to ONC Social Work  - MRSA Screen Culture (Outpatient) - Swab, Nares  - IR Removal Tunnel CV With Port Different Site     Malignant neoplasm of endometrium (CMS/HCC)   -   - IR Port Placement  - Ambulatory Referral to ONC Social Work     Depression, unspecified depression type  - IR Port Placement  - Ambulatory Referral to ONC Social Work     Anxiety  - IR Port Placement  - Ambulatory Referral to ONC Social Work        1. 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 Adjuvant chemotherapy and radiation has been recommended by Dr. Jama with combination of carboplatinum and Taxol with external beam radiation and vaginal brachytherapy in sandwiched fashion.  3. Status post 3 cycles of adjuvant chemotherapy with carboplatinum and Taxol   4. Currently receiving adjuvant pelvic radiation  5. Chemotherapy and radiation induced fatigue  6. Mild peripheral neuropathy grade 2  7. Pancytopenia secondary to chemotherapy: Improved  8. Dyspepsia: On Prilosec: She will continue the same  9. Chronic pain and anxiety management per PCP Dr. Dubose: Patient will continue to follow-up with Dr. Dubose for pain management and anxiety management.  10. History of invasive right breast cancer, status post right lumpectomy followed by chemotherapy for 4 cycles.  Will call [facility] for those records  11. Left Mediport since 2003: Examined by interventional radiologist and port is functional.  Port was therefore not removed     Discussion     I have reviewed her imaging studies, path report and clinical history.  I have also fully examined patient.  She has completely resected  carcinosarcoma of the endometrium.  She understand that she has a high risk malignancy with high risk for relapse without additional adjuvant treatments.  Patient will receive combination of carboplatinum and Taxol every 21 days for 3 cycles followed by WPRT and brachytherapy, followed by additional 3 cycles of chemotherapy in a sandwich fashion.  Patient has an appointment to see Dr. Glover this afternoon.  I reviewed the side effects of chemotherapy with carboplatinum and Taxol with her to include but not limited to:     Chemotherapy side effects include, but not limited to, nausea, vomiting, bone marrow suppression, which can result in blood, platelet transfusion. There is also risk of permanent bone marrow destruction, which can cause myelodysplastic syndrome or leukemia years down the line. There is risk of infection which can result in hospitalization and even death. There is also risk of fatigue, asthenia, alopecia which could become permanent. Chemo will help to reduce risk of relapse of cancer, but does not eliminate risk completely.     I also discussed that specifically Taxol chemotherapy can lead to hypersensitivity reaction, peripheral neuropathy, there is also a risk of permanent alopecia less than 1%, fluid retention, myalgias.     Carboplatinum can also lead to significant thrombocytopenia which may require platelet transfusions     Patient has probable nonfunctioning port on the left chest wall, I have recommended removal and insertion of a new port for chemotherapy                    Plans:     · Continue with chemotherapy but dose reduce carboplatin to AUC of 5 with the rest of the cycles, dose reduce Taxol by 25% with the rest of her remaining cycles.  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  Patient states that her neuropathy has improved to current grade 1 at this time  · Continue pelvic  radiation  ·  to help with transportation needs.  · Restart chemotherapy once completed  · Schedule monthly port flushes for now  · Patient given instructions to call for problems  · Continue Prilosec 40 mg p.o. daily  · Continue Vitamin B6 100 mg p.o. twice a day for neuropathy  · Continue supportive care medications  · EMLA cream  · Reviewed her labs today  · Follow-up 3 weeks or earlier as needed  · All questions answered.        Thank you very much for allowing me to participate in the care of Leidy, I will keep you updated on her progress        I spent 40 total minutes, face-to-face, caring for Xin today.  80% of this time involved counseling and/or coordination of care as documented within this note.

## 2021-06-08 ENCOUNTER — HOSPITAL ENCOUNTER (OUTPATIENT)
Dept: RADIATION ONCOLOGY | Facility: HOSPITAL | Age: 56
Discharge: HOME | End: 2021-06-08

## 2021-06-08 ENCOUNTER — OFFICE VISIT (OUTPATIENT)
Dept: ONCOLOGY | Facility: CLINIC | Age: 56
End: 2021-06-08

## 2021-06-08 ENCOUNTER — LAB (OUTPATIENT)
Dept: LAB | Facility: HOSPITAL | Age: 56
End: 2021-06-08

## 2021-06-08 VITALS
WEIGHT: 217.2 LBS | HEART RATE: 85 BPM | BODY MASS INDEX: 33.03 KG/M2 | DIASTOLIC BLOOD PRESSURE: 87 MMHG | TEMPERATURE: 97.9 F | SYSTOLIC BLOOD PRESSURE: 119 MMHG

## 2021-06-08 DIAGNOSIS — D69.6 THROMBOCYTOPENIA (HCC): ICD-10-CM

## 2021-06-08 DIAGNOSIS — C54.1 MALIGNANT NEOPLASM OF ENDOMETRIUM (HCC): ICD-10-CM

## 2021-06-08 DIAGNOSIS — C54.1 MALIGNANT NEOPLASM OF ENDOMETRIUM (HCC): Primary | ICD-10-CM

## 2021-06-08 LAB
BASOPHILS # BLD AUTO: 0.02 10*3/MM3 (ref 0–0.2)
BASOPHILS NFR BLD AUTO: 0.4 % (ref 0–1.5)
DEPRECATED RDW RBC AUTO: 52.2 FL (ref 37–54)
EOSINOPHIL # BLD AUTO: 0.09 10*3/MM3 (ref 0–0.4)
EOSINOPHIL NFR BLD AUTO: 1.7 % (ref 0.3–6.2)
ERYTHROCYTE [DISTWIDTH] IN BLOOD BY AUTOMATED COUNT: 14.1 % (ref 12.3–15.4)
HCT VFR BLD AUTO: 36.4 % (ref 34–46.6)
HGB BLD-MCNC: 12.7 G/DL (ref 12–15.9)
LYMPHOCYTES # BLD AUTO: 1.28 10*3/MM3 (ref 0.7–3.1)
LYMPHOCYTES NFR BLD AUTO: 23.7 % (ref 19.6–45.3)
MCH RBC QN AUTO: 35.4 PG (ref 26.6–33)
MCHC RBC AUTO-ENTMCNC: 34.9 G/DL (ref 31.5–35.7)
MCV RBC AUTO: 101.4 FL (ref 79–97)
MONOCYTES # BLD AUTO: 0.32 10*3/MM3 (ref 0.1–0.9)
MONOCYTES NFR BLD AUTO: 5.9 % (ref 5–12)
NEUTROPHILS NFR BLD AUTO: 3.7 10*3/MM3 (ref 1.7–7)
NEUTROPHILS NFR BLD AUTO: 68.3 % (ref 42.7–76)
PLATELET # BLD AUTO: 194 10*3/MM3 (ref 140–450)
PMV BLD AUTO: 8.7 FL (ref 6–12)
RBC # BLD AUTO: 3.59 10*6/MM3 (ref 3.77–5.28)
WBC # BLD AUTO: 5.41 10*3/MM3 (ref 3.4–10.8)

## 2021-06-08 PROCEDURE — 77386: CPT | Performed by: RADIOLOGY

## 2021-06-08 PROCEDURE — 77014 CHG CT GUIDANCE RADIATION THERAPY FLDS PLACEMENT: CPT | Performed by: RADIOLOGY

## 2021-06-08 PROCEDURE — 85025 COMPLETE CBC W/AUTO DIFF WBC: CPT

## 2021-06-08 PROCEDURE — 36415 COLL VENOUS BLD VENIPUNCTURE: CPT

## 2021-06-08 PROCEDURE — 99215 OFFICE O/P EST HI 40 MIN: CPT | Performed by: INTERNAL MEDICINE

## 2021-06-08 PROCEDURE — 77427 RADIATION TX MANAGEMENT X5: CPT | Performed by: RADIOLOGY

## 2021-06-08 RX ORDER — FLUTICASONE PROPIONATE AND SALMETEROL XINAFOATE 115; 21 UG/1; UG/1
AEROSOL, METERED RESPIRATORY (INHALATION) EVERY 12 HOURS SCHEDULED
Status: ON HOLD | COMMUNITY
End: 2022-06-07

## 2021-06-08 RX ORDER — BUDESONIDE AND FORMOTEROL FUMARATE DIHYDRATE 160; 4.5 UG/1; UG/1
2 AEROSOL RESPIRATORY (INHALATION)
COMMUNITY

## 2021-06-08 RX ORDER — ALBUTEROL SULFATE 90 UG/1
2 AEROSOL, METERED RESPIRATORY (INHALATION) EVERY 4 HOURS PRN
COMMUNITY

## 2021-06-09 ENCOUNTER — RADIATION ONCOLOGY WEEKLY ASSESSMENT (OUTPATIENT)
Dept: RADIATION ONCOLOGY | Facility: HOSPITAL | Age: 56
End: 2021-06-09

## 2021-06-09 ENCOUNTER — HOSPITAL ENCOUNTER (OUTPATIENT)
Dept: RADIATION ONCOLOGY | Facility: HOSPITAL | Age: 56
Discharge: HOME | End: 2021-06-09

## 2021-06-09 VITALS
TEMPERATURE: 97.5 F | HEIGHT: 68 IN | WEIGHT: 218 LBS | BODY MASS INDEX: 33.04 KG/M2 | DIASTOLIC BLOOD PRESSURE: 87 MMHG | SYSTOLIC BLOOD PRESSURE: 129 MMHG | OXYGEN SATURATION: 98 % | RESPIRATION RATE: 18 BRPM | HEART RATE: 91 BPM

## 2021-06-09 DIAGNOSIS — C55 MALIGNANT NEOPLASM OF UTERUS, UNSPECIFIED SITE (HCC): Primary | ICD-10-CM

## 2021-06-09 PROCEDURE — FACE2FACE: Performed by: RADIOLOGY

## 2021-06-09 PROCEDURE — 77386: CPT | Performed by: RADIOLOGY

## 2021-06-09 PROCEDURE — 77014 CHG CT GUIDANCE RADIATION THERAPY FLDS PLACEMENT: CPT | Performed by: RADIOLOGY

## 2021-06-09 NOTE — PROGRESS NOTES
"Patient Name: Xin Nj Date: 2021       : 1965        MRN #: [id number] Diagnosis:   Encounter Diagnosis   Name Primary?   • Malignant neoplasm of uterus, unspecified site (CMS/HCC) Yes               RADIATION ON TREATMENT VISIT NOTE - PELVIS    Treatment Summary      Treatment Site Ref. ID Energy Dose/Fx (cGy) #Fx Dose Correction (cGy) Total Dose (cGy) Start Date End Date Elapsed Days   Pelvis Init Pelvis 6X 180  0 1,260 2021 8     [id number], FIGO IB, carcinosarcoma of the uterus  [facility] chemo-XRT-chemo is the plan  Did ok with first part of chemo  General:           Review of Systems    [] No new complaints [] Nocturia [] Slow urinary stream  [] Difficulty initiating urination [] Dysuria [] Vaginal discharge  [] Increased frequency of urination [] Soft/loose stool [] Diarrhea  [] Rectal burning  [] Skin soreness, location:   [x] Fatigue,  severity: 1 Relief w/ Rest  [x] Pain,  severity: 6, location: Stomach  Bladder medication regimen: NONE   Pain medication regimen: NONE   Bowel regimen: NONE   Skin regimen: NONE     Comments/Notes:  Has imodium for diarrhea  Pain is chronic since chemo; has nausea meds    KPS: 80%       Vital Signs: /87   Pulse 91   Temp 97.5 °F (36.4 °C) (Oral)   Resp 18   Ht 172.7 cm (68\")   Wt 98.9 kg (218 lb)   SpO2 98%   BMI 33.15 kg/m²     Weight:   Wt Readings from Last 3 Encounters:   21 98.9 kg (218 lb)   21 98.5 kg (217 lb 3.2 oz)   21 101 kg (222 lb 3.2 oz)       Medication:   Current Outpatient Medications:   •  albuterol sulfate HFA (Ventolin HFA) 108 (90 Base) MCG/ACT inhaler, Every 4 (Four) Hours., Disp: , Rfl:   •  ALPRAZolam XR 0.5 MG 24 hr tablet, TAKE ONE TABLET BY MOUTH EVERY MORNING AS NEEDED, Disp: , Rfl:   •  budesonide-formoterol (Symbicort) 160-4.5 MCG/ACT inhaler, Every 12 (Twelve) Hours., Disp: , Rfl:   •  calcium carbonate-vitamin d 600-400 MG-UNIT per tablet, calcium carbonate 600 mg (1,500 " mg)-vitamin D3 400 unit tablet  TAKE 1 TABLET BY MOUTH EVERY DAY WITH FOOD, Disp: , Rfl:   •  cholecalciferol (VITAMIN D3) 25 MCG (1000 UT) tablet, Take 1,000 Units by mouth Daily., Disp: , Rfl:   •  dexamethasone (DECADRON) 4 MG tablet, Take 2 tablets in the morning daily on days 2, 3 & 4.  Take with food., Disp: 6 tablet, Rfl: 5  •  diclofenac sodium (VOTAREN XR) 100 MG 24 hr tablet, diclofenac  mg tablet,extended release 24 hr, Disp: , Rfl:   •  fluticasone-salmeterol (Advair HFA) 115-21 MCG/ACT inhaler, Every 12 (Twelve) Hours., Disp: , Rfl:   •  gabapentin (NEURONTIN) 800 MG tablet, Take 800 mg by mouth 3 (Three) Times a Day., Disp: , Rfl:   •  HYDROcodone-acetaminophen (NORCO)  MG per tablet, TAKE ONE TABLET BY MOUTH EVERY 8 HOURS AS NEEDED FOR 7 DAYS, Disp: , Rfl:   •  lidocaine-prilocaine (EMLA) 2.5-2.5 % cream, Apply  topically to the appropriate area as directed As Needed (prior to accessing port)., Disp: 30 g, Rfl: 2  •  TiZANidine (ZANAFLEX) 4 MG capsule, Take 4 mg by mouth 3 (Three) Times a Day As Needed., Disp: , Rfl:   •  vitamin B-6 (PYRIDOXINE) 100 MG tablet, Take 1 tablet by mouth 2 (two) times a day., Disp: 60 tablet, Rfl: 5  •  cloNIDine (CATAPRES) 0.2 MG tablet, clonidine HCl 0.2 mg tablet, Disp: , Rfl:   •  dicyclomine (BENTYL) 20 MG tablet, Take 20 mg by mouth 3 (Three) Times a Day., Disp: , Rfl:   •  DULoxetine (CYMBALTA) 60 MG capsule, Take 60 mg by mouth Daily., Disp: , Rfl:   •  loratadine (CLARITIN) 10 MG tablet, Take 10 mg by mouth Daily., Disp: , Rfl:   •  omeprazole (priLOSEC) 40 MG capsule, Take 1 capsule by mouth Daily., Disp: 30 capsule, Rfl: 6  •  ondansetron (ZOFRAN) 8 MG tablet, Take 1 tablet by mouth 3 (Three) Times a Day As Needed for Nausea or Vomiting., Disp: 30 tablet, Rfl: 1  •  promethazine (PHENERGAN) 12.5 MG tablet, Take 12.5 mg by mouth Every 12 (Twelve) Hours As Needed., Disp: , Rfl:   •  sennosides-docusate (PERICOLACE) 8.6-50 MG per tablet, , Disp: , Rfl:         LABS (Reviewed):  Hematology WBC   Date Value Ref Range Status   06/08/2021 5.41 3.40 - 10.80 10*3/mm3 Final     RBC   Date Value Ref Range Status   06/08/2021 3.59 (L) 3.77 - 5.28 10*6/mm3 Final     Hemoglobin   Date Value Ref Range Status   06/08/2021 12.7 12.0 - 15.9 g/dL Final     Hematocrit   Date Value Ref Range Status   06/08/2021 36.4 34.0 - 46.6 % Final     Platelets   Date Value Ref Range Status   06/08/2021 194 140 - 450 10*3/mm3 Final      Chemistry   Lab Results   Component Value Date    GLUCOSE 100 (H) 04/30/2021    BUN 16 04/30/2021    CREATININE 0.70 04/30/2021    EGFRIFNONA 86 04/30/2021    BCR 22.9 04/30/2021    K 3.8 04/30/2021    CO2 27.0 04/30/2021    CALCIUM 8.7 04/30/2021    ALBUMIN 3.60 04/30/2021    AST 13 04/30/2021    ALT 13 04/30/2021         Physical Exam:         Abdomen:  [x] Soft   [x] Bowel sounds  GYN:   [x] No Vaginal discharge  Extremities:  [] Edema  GI:   [] Diarrhea  [] Enteritis     [] Proctitis  [] Vomiting  Renal/Genitourinary: [] Bladder spasms [] Cystitis     [] Incontinence  Skin:   [x] stGstrstastdstest:st st1st Comments/Notes: CTAb  RRR    [x] Review of labs, images, dosimetry, dose delivered, & treatment parameters.    Comments:     [x] Patient treatment setup reviewed.    Comments:     Recommendations: continue XRT and supportive measures  Discussed pain meds and anti-emetics    [x] Continue RT  [] Change RT Plan [] Hold RT, length:        Approved Electronically By:  Kristofer Glover MD, 6/10/2021, 08:06 EDT          Confidentiality of this medical record shall be maintained except when use or disclosure is required or permitted by law, regulation or written authorization by the patient.

## 2021-06-10 PROCEDURE — 77014 CHG CT GUIDANCE RADIATION THERAPY FLDS PLACEMENT: CPT | Performed by: RADIOLOGY

## 2021-06-10 PROCEDURE — 77386: CPT | Performed by: RADIOLOGY

## 2021-06-10 PROCEDURE — 77336 RADIATION PHYSICS CONSULT: CPT | Performed by: RADIOLOGY

## 2021-06-11 ENCOUNTER — HOSPITAL ENCOUNTER (OUTPATIENT)
Dept: RADIATION ONCOLOGY | Facility: HOSPITAL | Age: 56
Discharge: HOME | End: 2021-06-11

## 2021-06-11 PROCEDURE — 77386: CPT | Performed by: RADIOLOGY

## 2021-06-11 PROCEDURE — 77014 CHG CT GUIDANCE RADIATION THERAPY FLDS PLACEMENT: CPT | Performed by: RADIOLOGY

## 2021-06-14 ENCOUNTER — HOSPITAL ENCOUNTER (OUTPATIENT)
Dept: RADIATION ONCOLOGY | Facility: HOSPITAL | Age: 56
Discharge: HOME | End: 2021-06-14

## 2021-06-14 ENCOUNTER — APPOINTMENT (OUTPATIENT)
Dept: LAB | Facility: HOSPITAL | Age: 56
End: 2021-06-14

## 2021-06-14 PROCEDURE — 77014 CHG CT GUIDANCE RADIATION THERAPY FLDS PLACEMENT: CPT | Performed by: RADIOLOGY

## 2021-06-14 PROCEDURE — 77386: CPT | Performed by: RADIOLOGY

## 2021-06-15 ENCOUNTER — HOSPITAL ENCOUNTER (OUTPATIENT)
Dept: RADIATION ONCOLOGY | Facility: HOSPITAL | Age: 56
Discharge: HOME | End: 2021-06-15

## 2021-06-15 PROCEDURE — 77386: CPT | Performed by: RADIOLOGY

## 2021-06-15 PROCEDURE — 77014 CHG CT GUIDANCE RADIATION THERAPY FLDS PLACEMENT: CPT | Performed by: RADIOLOGY

## 2021-06-15 PROCEDURE — 77427 RADIATION TX MANAGEMENT X5: CPT | Performed by: RADIOLOGY

## 2021-06-16 ENCOUNTER — HOSPITAL ENCOUNTER (OUTPATIENT)
Dept: RADIATION ONCOLOGY | Facility: HOSPITAL | Age: 56
Discharge: HOME | End: 2021-06-16

## 2021-06-16 PROCEDURE — 77014 CHG CT GUIDANCE RADIATION THERAPY FLDS PLACEMENT: CPT | Performed by: RADIOLOGY

## 2021-06-16 PROCEDURE — 77386: CPT | Performed by: RADIOLOGY

## 2021-06-17 PROCEDURE — 77336 RADIATION PHYSICS CONSULT: CPT | Performed by: RADIOLOGY

## 2021-06-17 PROCEDURE — 77014 CHG CT GUIDANCE RADIATION THERAPY FLDS PLACEMENT: CPT | Performed by: RADIOLOGY

## 2021-06-17 PROCEDURE — 77386: CPT | Performed by: RADIOLOGY

## 2021-06-18 PROCEDURE — 77386: CPT | Performed by: RADIOLOGY

## 2021-06-18 PROCEDURE — 77014 CHG CT GUIDANCE RADIATION THERAPY FLDS PLACEMENT: CPT | Performed by: RADIOLOGY

## 2021-06-21 ENCOUNTER — HOSPITAL ENCOUNTER (OUTPATIENT)
Dept: RADIATION ONCOLOGY | Facility: HOSPITAL | Age: 56
Discharge: HOME | End: 2021-06-21

## 2021-06-21 PROCEDURE — 77386: CPT | Performed by: RADIOLOGY

## 2021-06-21 PROCEDURE — 77014 CHG CT GUIDANCE RADIATION THERAPY FLDS PLACEMENT: CPT | Performed by: RADIOLOGY

## 2021-06-22 PROCEDURE — 77014 CHG CT GUIDANCE RADIATION THERAPY FLDS PLACEMENT: CPT | Performed by: RADIOLOGY

## 2021-06-22 PROCEDURE — 77386: CPT | Performed by: RADIOLOGY

## 2021-06-22 PROCEDURE — 77427 RADIATION TX MANAGEMENT X5: CPT | Performed by: RADIOLOGY

## 2021-06-23 ENCOUNTER — HOSPITAL ENCOUNTER (OUTPATIENT)
Dept: RADIATION ONCOLOGY | Facility: HOSPITAL | Age: 56
Discharge: HOME | End: 2021-06-23

## 2021-06-23 ENCOUNTER — RADIATION ONCOLOGY WEEKLY ASSESSMENT (OUTPATIENT)
Dept: RADIATION ONCOLOGY | Facility: HOSPITAL | Age: 56
End: 2021-06-23

## 2021-06-23 VITALS
RESPIRATION RATE: 19 BRPM | BODY MASS INDEX: 32.89 KG/M2 | WEIGHT: 217 LBS | HEART RATE: 98 BPM | TEMPERATURE: 97.9 F | HEIGHT: 68 IN | SYSTOLIC BLOOD PRESSURE: 142 MMHG | OXYGEN SATURATION: 97 % | DIASTOLIC BLOOD PRESSURE: 86 MMHG

## 2021-06-23 DIAGNOSIS — C55 MALIGNANT NEOPLASM OF UTERUS, UNSPECIFIED SITE (HCC): Primary | ICD-10-CM

## 2021-06-23 PROCEDURE — FACE2FACE: Performed by: RADIOLOGY

## 2021-06-23 PROCEDURE — 77014 CHG CT GUIDANCE RADIATION THERAPY FLDS PLACEMENT: CPT | Performed by: RADIOLOGY

## 2021-06-23 PROCEDURE — 77336 RADIATION PHYSICS CONSULT: CPT | Performed by: RADIOLOGY

## 2021-06-23 PROCEDURE — 77386: CPT | Performed by: RADIOLOGY

## 2021-06-23 NOTE — PROGRESS NOTES
Hematology/Oncology Outpatient Follow Up    PATIENT NAME:Xin Nj  :1965  MRN: [id number]  PRIMARY CARE PHYSICIAN: Park Dubose MD  REFERRING PHYSICIAN: Park Dubose,*    Chief Complaint   Patient presents with   • Follow-up     Malignant neoplasm of endometrium (CMS/HCC)        HISTORY OF PRESENT ILLNESS:     This is a 55-year-old female who developed postmenopausal vaginal bleeding for approximately one and half years.  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  On 2020-10-31 patient had CT scan of the abdomen and pelvis with contrast there were no acute findings in the abdomen or pelvis.  Stable hypodense lesion in the liver   Patient was then referred to Dr. Jama with [facility] and on 11/10/2020 patient underwent   total robotic hysterectomy, bilateral salpingo-oophorectomy and sentinel lymphadenectomy.  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  There was no significant pathology identified in the right or left ovaries.  One left pelvic sentinel lymph node was benign for malignancy.  On the right pelvic  7 benign lymph nodes we are removed.  The mass measured 7 cm in size.  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  There was evidence of lymphovascular invasion.  Pathology stage is pT1bN0 FIGO 1B     Dr. Jama and team have recommended adjuvant chemotherapy with carboplatinum and Taxol sandwiched with external beam radiation and vaginal cuff brachytherapy.  Total of 6 cycles of chemotherapy with carboplatinum and Taxol has been recommended with 3 cycles prior to radiation and subsequent 3 cycles after radiation in a sandwich fashion.     Patient has a history of right breast cancer that was diagnosed in .  She underwent right lumpectomy followed by chemotherapy for 4  cycles.  Adjuvant radiation was recommended but she declined radiation treatments.  Patient states that her tumor was infiltrated on her right chest wall and for that reason she aborted all future treatments.  She has been without any evidence of relapsed disease.  Patient still has her port from 2003 which has not been flushed or cared for since then.     She is accompanied today by 2 of her daughters one by phone for this visit      · 2/23/2021 patient was initiated on adjuvant chemotherapy with carboplatinum and Taxol  · 3/18/2021: Patient had cycle 2 chemotherapy with carboplatinum only.  Taxol was held  · Taxol was held with second cycle of chemotherapy due to significant neuropathy.  This has now improved.  She is currently on Neurontin 600 mg 3 times a day.  We discussed that neuropathy is irreversible  · 4/30/2021 patient received cycle 3 of chemotherapy with carboplatinum and Taxol  · Patient started adjuvant pelvic radiation 2 weeks ago  Past Medical History:   Diagnosis Date   • Anxiety    • COPD (chronic obstructive pulmonary disease) (CMS/HCC)    • COPD (chronic obstructive pulmonary disease) (CMS/HCC)    • Depression    • Encounter for antineoplastic radiation therapy    • Endometrial cancer (CMS/HCC)    • History of right breast cancer    • Osteoporosis        Past Surgical History:   Procedure Laterality Date   • BREAST LUMPECTOMY Right    • HYSTERECTOMY  2020         Current Outpatient Medications:   •  albuterol sulfate HFA (Ventolin HFA) 108 (90 Base) MCG/ACT inhaler, Every 4 (Four) Hours., Disp: , Rfl:   •  ALPRAZolam XR 0.5 MG 24 hr tablet, TAKE ONE TABLET BY MOUTH EVERY MORNING AS NEEDED, Disp: , Rfl:   •  budesonide-formoterol (Symbicort) 160-4.5 MCG/ACT inhaler, Every 12 (Twelve) Hours., Disp: , Rfl:   •  calcium carbonate-vitamin d 600-400 MG-UNIT per tablet, calcium carbonate 600 mg (1,500 mg)-vitamin D3 400 unit tablet  TAKE 1 TABLET BY MOUTH EVERY DAY WITH FOOD, Disp: , Rfl:   •   cholecalciferol (VITAMIN D3) 25 MCG (1000 UT) tablet, Take 1,000 Units by mouth Daily., Disp: , Rfl:   •  cloNIDine (CATAPRES) 0.2 MG tablet, clonidine HCl 0.2 mg tablet, Disp: , Rfl:   •  dexamethasone (DECADRON) 4 MG tablet, Take 2 tablets in the morning daily on days 2, 3 & 4.  Take with food., Disp: 6 tablet, Rfl: 5  •  diclofenac sodium (VOTAREN XR) 100 MG 24 hr tablet, diclofenac  mg tablet,extended release 24 hr, Disp: , Rfl:   •  dicyclomine (BENTYL) 20 MG tablet, Take 20 mg by mouth 3 (Three) Times a Day., Disp: , Rfl:   •  DULoxetine (CYMBALTA) 60 MG capsule, Take 60 mg by mouth Daily., Disp: , Rfl:   •  fluticasone-salmeterol (Advair HFA) 115-21 MCG/ACT inhaler, Every 12 (Twelve) Hours., Disp: , Rfl:   •  gabapentin (NEURONTIN) 800 MG tablet, Take 800 mg by mouth 3 (Three) Times a Day., Disp: , Rfl:   •  HYDROcodone-acetaminophen (NORCO)  MG per tablet, TAKE ONE TABLET BY MOUTH EVERY 8 HOURS AS NEEDED FOR 7 DAYS, Disp: , Rfl:   •  lidocaine-prilocaine (EMLA) 2.5-2.5 % cream, Apply  topically to the appropriate area as directed As Needed (prior to accessing port)., Disp: 30 g, Rfl: 2  •  loratadine (CLARITIN) 10 MG tablet, Take 10 mg by mouth Daily., Disp: , Rfl:   •  omeprazole (priLOSEC) 40 MG capsule, Take 1 capsule by mouth Daily., Disp: 30 capsule, Rfl: 6  •  ondansetron (ZOFRAN) 8 MG tablet, Take 1 tablet by mouth Every 8 (Eight) Hours As Needed for Nausea or Vomiting., Disp: 30 tablet, Rfl: 3  •  promethazine (PHENERGAN) 12.5 MG tablet, Take 12.5 mg by mouth Every 12 (Twelve) Hours As Needed., Disp: , Rfl:   •  sennosides-docusate (PERICOLACE) 8.6-50 MG per tablet, , Disp: , Rfl:   •  TiZANidine (ZANAFLEX) 4 MG capsule, Take 4 mg by mouth 3 (Three) Times a Day As Needed., Disp: , Rfl:   •  vitamin B-6 (PYRIDOXINE) 100 MG tablet, Take 1 tablet by mouth 2 (two) times a day., Disp: 60 tablet, Rfl: 5  No current facility-administered medications for this visit.    Facility-Administered  Medications Ordered in Other Visits:   •  heparin injection 500 Units, 500 Units, Intravenous, PRN, Juliana Neal MD, 500 Units at 06/29/21 1453  •  sodium chloride 0.9 % flush 10 mL, 10 mL, Intravenous, PRNVal Ifeoma Roseline, MD, 30 mL at 06/29/21 1330    No Known Allergies    Family History   Problem Relation Age of Onset   • Stroke Mother    • Lung cancer Brother        Cancer-related family history includes Lung cancer in her brother.    Social History     Tobacco Use   • Smoking status: Current Every Day Smoker   • Smokeless tobacco: Never Used   • Tobacco comment: down to 1 ppd   Vaping Use   • Vaping Use: Never used   Substance Use Topics   • Alcohol use: Not Currently   • Drug use: Never       HPI, ROS and PFSH have been reviewed and confirmed on 6/29/2021.     SUBJECTIVE:    She is here today for follow-up.  Her neuropathy has improved.  Complains of fatigue.  She has mild diarrhea with radiation        REVIEW OF SYSTEMS:  Review of Systems   Constitutional: Negative for chills and fever.   HENT: Negative for ear pain, mouth sores, nosebleeds and sore throat.    Eyes: Negative for photophobia and visual disturbance.   Respiratory: Negative for wheezing and stridor.    Cardiovascular: Negative for chest pain and palpitations.   Gastrointestinal: Negative for abdominal pain, diarrhea, nausea and vomiting.   Endocrine: Negative for cold intolerance and heat intolerance.   Genitourinary: Negative for dysuria and hematuria.   Musculoskeletal: Negative for joint swelling and neck stiffness.   Skin: Negative for color change and rash.   Neurological: Negative for seizures and syncope.   Hematological: Negative for adenopathy.        No obvious bleeding   Psychiatric/Behavioral: Negative for agitation, confusion and hallucinations.       OBJECTIVE:    Vitals:    06/29/21 1350   BP: 117/77   Pulse: 71   Temp: 98.7 °F (37.1 °C)   Weight: 98.5 kg (217 lb 3.2 oz)   PainSc:   6   PainLoc: Shoulder      Body mass index is 33.03 kg/m².    ECOG  (0) Fully active, able to carry on all predisease performance without restriction    Physical Exam  Vitals and nursing note reviewed.   Constitutional:       General: She is not in acute distress.     Appearance: She is not diaphoretic.   HENT:      Head: Normocephalic and atraumatic.   Eyes:      General: No scleral icterus.        Right eye: No discharge.         Left eye: No discharge.      Conjunctiva/sclera: Conjunctivae normal.   Neck:      Thyroid: No thyromegaly.   Cardiovascular:      Rate and Rhythm: Normal rate and regular rhythm.      Heart sounds: Normal heart sounds. No friction rub. No gallop.    Pulmonary:      Effort: Pulmonary effort is normal. No respiratory distress.      Breath sounds: No stridor. No wheezing.   Abdominal:      General: Bowel sounds are normal.      Palpations: Abdomen is soft. There is no mass.      Tenderness: There is no abdominal tenderness. There is no guarding or rebound.   Musculoskeletal:         General: No tenderness. Normal range of motion.      Cervical back: Normal range of motion and neck supple.   Lymphadenopathy:      Cervical: No cervical adenopathy.   Skin:     General: Skin is warm.      Findings: No erythema or rash.   Neurological:      Mental Status: She is alert and oriented to person, place, and time.      Motor: No abnormal muscle tone.   Psychiatric:         Behavior: Behavior normal.     I have reexamined the patient and the results are consistent with the previously documented exam. Juliana Neal MD     RECENT LABS  WBC   Date Value Ref Range Status   06/29/2021 4.80 3.40 - 10.80 10*3/mm3 Final     RBC   Date Value Ref Range Status   06/29/2021 3.44 (L) 3.77 - 5.28 10*6/mm3 Final     Hemoglobin   Date Value Ref Range Status   06/29/2021 11.7 (L) 12.0 - 15.9 g/dL Final     Hematocrit   Date Value Ref Range Status   06/29/2021 36.8 34.0 - 46.6 % Final     MCV   Date Value Ref Range Status    06/29/2021 107.0 (H) 79.0 - 97.0 fL Final     MCH   Date Value Ref Range Status   06/29/2021 34.0 (H) 26.6 - 33.0 pg Final     MCHC   Date Value Ref Range Status   06/29/2021 31.8 31.5 - 35.7 g/dL Final     RDW   Date Value Ref Range Status   06/29/2021 12.8 12.3 - 15.4 % Final     RDW-SD   Date Value Ref Range Status   06/29/2021 49.3 37.0 - 54.0 fl Final     MPV   Date Value Ref Range Status   06/29/2021 8.9 6.0 - 12.0 fL Final     Platelets   Date Value Ref Range Status   06/29/2021 154 140 - 450 10*3/mm3 Final     Neutrophil %   Date Value Ref Range Status   06/29/2021 74.2 42.7 - 76.0 % Final     Lymphocyte %   Date Value Ref Range Status   06/29/2021 14.8 (L) 19.6 - 45.3 % Final     Monocyte %   Date Value Ref Range Status   06/29/2021 7.9 5.0 - 12.0 % Final     Eosinophil %   Date Value Ref Range Status   06/29/2021 2.9 0.3 - 6.2 % Final     Basophil %   Date Value Ref Range Status   06/29/2021 0.2 0.0 - 1.5 % Final     Neutrophils, Absolute   Date Value Ref Range Status   06/29/2021 3.56 1.70 - 7.00 10*3/mm3 Final     Lymphocytes, Absolute   Date Value Ref Range Status   06/29/2021 0.71 0.70 - 3.10 10*3/mm3 Final     Monocytes, Absolute   Date Value Ref Range Status   06/29/2021 0.38 0.10 - 0.90 10*3/mm3 Final     Eosinophils, Absolute   Date Value Ref Range Status   06/29/2021 0.14 0.00 - 0.40 10*3/mm3 Final     Basophils, Absolute   Date Value Ref Range Status   06/29/2021 0.01 0.00 - 0.20 10*3/mm3 Final     nRBC   Date Value Ref Range Status   03/31/2021 0.0 0.0 - 0.2 /100 WBC Final       Lab Results   Component Value Date    GLUCOSE 100 (H) 04/30/2021    BUN 16 04/30/2021    CREATININE 0.70 04/30/2021    EGFRIFNONA 86 04/30/2021    BCR 22.9 04/30/2021    K 3.8 04/30/2021    CO2 27.0 04/30/2021    CALCIUM 8.7 04/30/2021    ALBUMIN 3.60 04/30/2021    AST 13 04/30/2021    ALT 13 04/30/2021         Assessment/Plan     Malignant neoplasm of endometrium (CMS/HCC)  - CBC & Differential  - ondansetron (ZOFRAN)  8 MG tablet    Thrombocytopenia (CMS/HCC)  - CBC & Differential      ASSESSMENT:       Malignant neoplasm of uterus, unspecified site (CMS/HCC)  - CBC & Differential  - Comprehensive Metabolic Panel  -   - IR Port Placement  - Ambulatory Referral to ONC Social Work  - MRSA Screen Culture (Outpatient) - Swab, Nares  - IR Removal Tunnel CV With Port Different Site     Malignant neoplasm of endometrium (CMS/HCC)   -   - IR Port Placement  - Ambulatory Referral to ONC Social Work     Depression, unspecified depression type  - IR Port Placement  - Ambulatory Referral to ONC Social Work     Anxiety  - IR Port Placement  - Ambulatory Referral to ONC Social Work        1. 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 Adjuvant chemotherapy and radiation has been recommended by Dr. Jama with combination of carboplatinum and Taxol with external beam radiation and vaginal brachytherapy in sandwiched fashion.  3. Status post 3 cycles of adjuvant chemotherapy with carboplatinum and Taxol   4. Currently receiving adjuvant pelvic radiation.  Patient to finish adjuvant radiation on 7/15/2021  5. Radiation induced diarrhea: Mild  6. Nausea: We will refill Zofran  7. Chemotherapy and radiation induced fatigue  8. Mild peripheral neuropathy grade 2  9. Pancytopenia secondary to chemotherapy: Improved  10. Dyspepsia: On Prilosec: She will continue the same  11. Chronic pain and anxiety management per PCP Dr. Dubose: Patient will continue to follow-up with Dr. Dubose for pain management and anxiety management.  12. History of invasive right breast cancer, status post right lumpectomy followed by chemotherapy for 4 cycles.  [facility]  13. Left Mediport since 2003: Examined by interventional radiologist and port is functional.  Port was therefore not removed     Discussion     I have reviewed her  imaging studies, path report and clinical history.  I have also fully examined patient.  She has completely resected carcinosarcoma of the endometrium.  She understand that she has a high risk malignancy with high risk for relapse without additional adjuvant treatments.  Patient will receive combination of carboplatinum and Taxol every 21 days for 3 cycles followed by WPRT and brachytherapy, followed by additional 3 cycles of chemotherapy in a sandwich fashion.  Patient has an appointment to see Dr. Glover this afternoon.  I reviewed the side effects of chemotherapy with carboplatinum and Taxol with her to include but not limited to:     Chemotherapy side effects include, but not limited to, nausea, vomiting, bone marrow suppression, which can result in blood, platelet transfusion. There is also risk of permanent bone marrow destruction, which can cause myelodysplastic syndrome or leukemia years down the line. There is risk of infection which can result in hospitalization and even death. There is also risk of fatigue, asthenia, alopecia which could become permanent. Chemo will help to reduce risk of relapse of cancer, but does not eliminate risk completely.     I also discussed that specifically Taxol chemotherapy can lead to hypersensitivity reaction, peripheral neuropathy, there is also a risk of permanent alopecia less than 1%, fluid retention, myalgias.     Carboplatinum can also lead to significant thrombocytopenia which may require platelet transfusions     Patient has probable nonfunctioning port on the left chest wall, I have recommended removal and insertion of a new port for chemotherapy                    Plans:     · Continue with chemotherapy but dose reduce carboplatin to AUC of 5 with the rest of the cycles, dose reduce Taxol by 25% with the rest of her remaining cycles.  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   Patient states that her neuropathy has improved to current grade 1 at this time  · Continue pelvic radiation  · Refill Zofran  · Imodium as needed for diarrhea.  Signs and symptoms for her to notify us for should they occur discussed  ·  to help with transportation needs.  · Restart chemotherapy once completed XRT  · Schedule monthly port flushes for now  · Patient given instructions to call for problems  · Continue Prilosec 40 mg p.o. daily  · Continue Vitamin B6 100 mg p.o. twice a day for neuropathy  · Continue supportive care medications  · EMLA cream  · Reviewed her labs today  · Follow-up 4 weeks  · All questions answered.        Thank you very much for allowing me to participate in the care of Leidy, I will keep you updated on her progress        I spent 30 total minutes, face-to-face, caring for Xin today.  80% of this time involved counseling and/or coordination of care as documented within this note.

## 2021-06-23 NOTE — PROGRESS NOTES
"Patient Name: Xin Nj Date: 2021       : 1965        MRN #: [id number] Diagnosis:   Encounter Diagnosis   Name Primary?   • Malignant neoplasm of uterus, unspecified site (CMS/HCC) Yes                     RADIATION ON TREATMENT VISIT NOTE - PELVIS    Treatment Summary      Treatment Site Ref. ID Energy Dose/Fx (cGy) #Fx Dose Correction (cGy) Total Dose (cGy) Start Date End Date Elapsed Days   Pelvis Init Pelvis 6X 180  0 3,060 2021 22     [id number], FIGO IB, carcinosarcoma of the uterus  [place] chemo-XRT-chemo is the plan  Did ok with first part of chemo  General:           Review of Systems    [] No new complaints [] Nocturia [] Slow urinary stream  [] Difficulty initiating urination [] Dysuria [] Vaginal discharge  [] Increased frequency of urination [x] Soft/loose stool [x] Diarrhea--much less, hasn't needed imodium recently  [] Rectal burning  [] Skin soreness, location:   [x] Fatigue,  severity: 1 Relief w/ Rest  [x] Pain,  severity: 6, location: abdomen  Bladder medication regimen: NONE   Pain medication regimen: Hydrocodone   Bowel regimen: Imodium   Skin regimen: Aquaphor     Comments/Notes:  Pain meds help  Discussed nausea meds    KPS: 90%       Vital Signs: /86   Pulse 98   Temp 97.9 °F (36.6 °C) (Oral)   Resp 19   Ht 172.7 cm (68\")   Wt 98.4 kg (217 lb)   SpO2 97%   BMI 32.99 kg/m²     Weight:   Wt Readings from Last 3 Encounters:   21 98.4 kg (217 lb)   21 99.8 kg (220 lb)   21 98.9 kg (218 lb)       Medication:   Current Outpatient Medications:   •  albuterol sulfate HFA (Ventolin HFA) 108 (90 Base) MCG/ACT inhaler, Every 4 (Four) Hours., Disp: , Rfl:   •  ALPRAZolam XR 0.5 MG 24 hr tablet, TAKE ONE TABLET BY MOUTH EVERY MORNING AS NEEDED, Disp: , Rfl:   •  budesonide-formoterol (Symbicort) 160-4.5 MCG/ACT inhaler, Every 12 (Twelve) Hours., Disp: , Rfl:   •  calcium carbonate-vitamin d 600-400 MG-UNIT per tablet, calcium carbonate " 600 mg (1,500 mg)-vitamin D3 400 unit tablet  TAKE 1 TABLET BY MOUTH EVERY DAY WITH FOOD, Disp: , Rfl:   •  cholecalciferol (VITAMIN D3) 25 MCG (1000 UT) tablet, Take 1,000 Units by mouth Daily., Disp: , Rfl:   •  cloNIDine (CATAPRES) 0.2 MG tablet, clonidine HCl 0.2 mg tablet, Disp: , Rfl:   •  dexamethasone (DECADRON) 4 MG tablet, Take 2 tablets in the morning daily on days 2, 3 & 4.  Take with food., Disp: 6 tablet, Rfl: 5  •  diclofenac sodium (VOTAREN XR) 100 MG 24 hr tablet, diclofenac  mg tablet,extended release 24 hr, Disp: , Rfl:   •  dicyclomine (BENTYL) 20 MG tablet, Take 20 mg by mouth 3 (Three) Times a Day., Disp: , Rfl:   •  DULoxetine (CYMBALTA) 60 MG capsule, Take 60 mg by mouth Daily., Disp: , Rfl:   •  fluticasone-salmeterol (Advair HFA) 115-21 MCG/ACT inhaler, Every 12 (Twelve) Hours., Disp: , Rfl:   •  gabapentin (NEURONTIN) 800 MG tablet, Take 800 mg by mouth 3 (Three) Times a Day., Disp: , Rfl:   •  HYDROcodone-acetaminophen (NORCO)  MG per tablet, TAKE ONE TABLET BY MOUTH EVERY 8 HOURS AS NEEDED FOR 7 DAYS, Disp: , Rfl:   •  lidocaine-prilocaine (EMLA) 2.5-2.5 % cream, Apply  topically to the appropriate area as directed As Needed (prior to accessing port)., Disp: 30 g, Rfl: 2  •  loratadine (CLARITIN) 10 MG tablet, Take 10 mg by mouth Daily., Disp: , Rfl:   •  omeprazole (priLOSEC) 40 MG capsule, Take 1 capsule by mouth Daily., Disp: 30 capsule, Rfl: 6  •  ondansetron (ZOFRAN) 8 MG tablet, Take 1 tablet by mouth 3 (Three) Times a Day As Needed for Nausea or Vomiting., Disp: 30 tablet, Rfl: 1  •  promethazine (PHENERGAN) 12.5 MG tablet, Take 12.5 mg by mouth Every 12 (Twelve) Hours As Needed., Disp: , Rfl:   •  sennosides-docusate (PERICOLACE) 8.6-50 MG per tablet, , Disp: , Rfl:   •  TiZANidine (ZANAFLEX) 4 MG capsule, Take 4 mg by mouth 3 (Three) Times a Day As Needed., Disp: , Rfl:   •  vitamin B-6 (PYRIDOXINE) 100 MG tablet, Take 1 tablet by mouth 2 (two) times a day., Disp: 60  tablet, Rfl: 5       LABS (Reviewed):  Hematology WBC   Date Value Ref Range Status   06/08/2021 5.41 3.40 - 10.80 10*3/mm3 Final     RBC   Date Value Ref Range Status   06/08/2021 3.59 (L) 3.77 - 5.28 10*6/mm3 Final     Hemoglobin   Date Value Ref Range Status   06/08/2021 12.7 12.0 - 15.9 g/dL Final     Hematocrit   Date Value Ref Range Status   06/08/2021 36.4 34.0 - 46.6 % Final     Platelets   Date Value Ref Range Status   06/08/2021 194 140 - 450 10*3/mm3 Final      Chemistry   Lab Results   Component Value Date    GLUCOSE 100 (H) 04/30/2021    BUN 16 04/30/2021    CREATININE 0.70 04/30/2021    EGFRIFNONA 86 04/30/2021    BCR 22.9 04/30/2021    K 3.8 04/30/2021    CO2 27.0 04/30/2021    CALCIUM 8.7 04/30/2021    ALBUMIN 3.60 04/30/2021    AST 13 04/30/2021    ALT 13 04/30/2021         Physical Exam:         Abdomen:  [x] Soft   [x] Bowel sounds  GYN:   [x] No Vaginal discharge  Extremities:  [] Edema  GI:   [x] Diarrhea--improved  [] Enteritis     [] Proctitis  [] Vomiting  Renal/Genitourinary: [] Bladder spasms [] Cystitis     [] Incontinence  Skin:   [x] stGstrstastdstest:st st1st Comments/Notes:     [x] Review of labs, images, dosimetry, dose delivered, & treatment parameters.    Comments:     [x] Patient treatment setup reviewed.    Comments:     Recommendations:continue XRT and supportive measures  Discussed adding zofran 30 minutes pre-XRT to see if helps.    [x] Continue RT  [] Change RT Plan [] Hold RT, length:        Approved Electronically By:  Kristofer Glover MD, 6/23/2021, 13:21 EDT          Confidentiality of this medical record shall be maintained except when use or disclosure is required or permitted by law, regulation or written authorization by the patient.

## 2021-06-25 PROCEDURE — 77386: CPT | Performed by: RADIOLOGY

## 2021-06-25 PROCEDURE — 77014 CHG CT GUIDANCE RADIATION THERAPY FLDS PLACEMENT: CPT | Performed by: RADIOLOGY

## 2021-06-28 ENCOUNTER — HOSPITAL ENCOUNTER (OUTPATIENT)
Dept: RADIATION ONCOLOGY | Facility: HOSPITAL | Age: 56
Discharge: HOME | End: 2021-06-28

## 2021-06-28 PROCEDURE — 77014 CHG CT GUIDANCE RADIATION THERAPY FLDS PLACEMENT: CPT | Performed by: RADIOLOGY

## 2021-06-28 PROCEDURE — 77386: CPT | Performed by: RADIOLOGY

## 2021-06-29 ENCOUNTER — HOSPITAL ENCOUNTER (OUTPATIENT)
Dept: ONCOLOGY | Facility: HOSPITAL | Age: 56
Setting detail: INFUSION SERIES
Discharge: HOME | End: 2021-06-29

## 2021-06-29 ENCOUNTER — OFFICE VISIT (OUTPATIENT)
Dept: ONCOLOGY | Facility: CLINIC | Age: 56
End: 2021-06-29

## 2021-06-29 VITALS
HEART RATE: 71 BPM | BODY MASS INDEX: 33.03 KG/M2 | DIASTOLIC BLOOD PRESSURE: 77 MMHG | SYSTOLIC BLOOD PRESSURE: 117 MMHG | TEMPERATURE: 98.7 F | WEIGHT: 217.2 LBS

## 2021-06-29 DIAGNOSIS — T45.1X5A CHEMOTHERAPY INDUCED NEUTROPENIA (HCC): ICD-10-CM

## 2021-06-29 DIAGNOSIS — D70.1 CHEMOTHERAPY INDUCED NEUTROPENIA (HCC): ICD-10-CM

## 2021-06-29 DIAGNOSIS — C54.1 MALIGNANT NEOPLASM OF ENDOMETRIUM (HCC): Primary | ICD-10-CM

## 2021-06-29 DIAGNOSIS — D69.6 THROMBOCYTOPENIA (HCC): ICD-10-CM

## 2021-06-29 LAB
BASOPHILS # BLD AUTO: 0.01 10*3/MM3 (ref 0–0.2)
BASOPHILS NFR BLD AUTO: 0.2 % (ref 0–1.5)
DEPRECATED RDW RBC AUTO: 49.3 FL (ref 37–54)
EOSINOPHIL # BLD AUTO: 0.14 10*3/MM3 (ref 0–0.4)
EOSINOPHIL NFR BLD AUTO: 2.9 % (ref 0.3–6.2)
ERYTHROCYTE [DISTWIDTH] IN BLOOD BY AUTOMATED COUNT: 12.8 % (ref 12.3–15.4)
HCT VFR BLD AUTO: 36.8 % (ref 34–46.6)
HGB BLD-MCNC: 11.7 G/DL (ref 12–15.9)
LYMPHOCYTES # BLD AUTO: 0.71 10*3/MM3 (ref 0.7–3.1)
LYMPHOCYTES NFR BLD AUTO: 14.8 % (ref 19.6–45.3)
MCH RBC QN AUTO: 34 PG (ref 26.6–33)
MCHC RBC AUTO-ENTMCNC: 31.8 G/DL (ref 31.5–35.7)
MCV RBC AUTO: 107 FL (ref 79–97)
MONOCYTES # BLD AUTO: 0.38 10*3/MM3 (ref 0.1–0.9)
MONOCYTES NFR BLD AUTO: 7.9 % (ref 5–12)
NEUTROPHILS NFR BLD AUTO: 3.56 10*3/MM3 (ref 1.7–7)
NEUTROPHILS NFR BLD AUTO: 74.2 % (ref 42.7–76)
PLATELET # BLD AUTO: 154 10*3/MM3 (ref 140–450)
PMV BLD AUTO: 8.9 FL (ref 6–12)
RBC # BLD AUTO: 3.44 10*6/MM3 (ref 3.77–5.28)
WBC # BLD AUTO: 4.8 10*3/MM3 (ref 3.4–10.8)

## 2021-06-29 PROCEDURE — 85025 COMPLETE CBC W/AUTO DIFF WBC: CPT | Performed by: INTERNAL MEDICINE

## 2021-06-29 PROCEDURE — 36591 DRAW BLOOD OFF VENOUS DEVICE: CPT

## 2021-06-29 PROCEDURE — 99214 OFFICE O/P EST MOD 30 MIN: CPT | Performed by: INTERNAL MEDICINE

## 2021-06-29 PROCEDURE — 25010000002 HEPARIN LOCK FLUSH PER 10 UNITS: Performed by: INTERNAL MEDICINE

## 2021-06-29 PROCEDURE — 77427 RADIATION TX MANAGEMENT X5: CPT | Performed by: RADIOLOGY

## 2021-06-29 RX ORDER — SODIUM CHLORIDE 0.9 % (FLUSH) 0.9 %
10 SYRINGE (ML) INJECTION AS NEEDED
Status: DISCONTINUED | OUTPATIENT
Start: 2021-06-29 | End: 2021-06-30 | Stop reason: HOSPADM

## 2021-06-29 RX ORDER — ONDANSETRON HYDROCHLORIDE 8 MG/1
8 TABLET, FILM COATED ORAL EVERY 8 HOURS PRN
Qty: 30 TABLET | Refills: 3 | Status: SHIPPED | OUTPATIENT
Start: 2021-06-29 | End: 2021-11-12

## 2021-06-29 RX ORDER — SODIUM CHLORIDE 0.9 % (FLUSH) 0.9 %
10 SYRINGE (ML) INJECTION AS NEEDED
OUTPATIENT
Start: 2021-06-29

## 2021-06-29 RX ORDER — HEPARIN SODIUM (PORCINE) LOCK FLUSH IV SOLN 100 UNIT/ML 100 UNIT/ML
500 SOLUTION INTRAVENOUS AS NEEDED
OUTPATIENT
Start: 2021-06-29

## 2021-06-29 RX ORDER — HEPARIN SODIUM (PORCINE) LOCK FLUSH IV SOLN 100 UNIT/ML 100 UNIT/ML
500 SOLUTION INTRAVENOUS AS NEEDED
Status: DISCONTINUED | OUTPATIENT
Start: 2021-06-29 | End: 2021-06-30 | Stop reason: HOSPADM

## 2021-06-29 RX ADMIN — Medication 30 ML: at 13:30

## 2021-06-29 RX ADMIN — HEPARIN 500 UNITS: 100 SYRINGE at 14:53

## 2021-06-30 ENCOUNTER — RADIATION ONCOLOGY WEEKLY ASSESSMENT (OUTPATIENT)
Dept: RADIATION ONCOLOGY | Facility: HOSPITAL | Age: 56
End: 2021-06-30

## 2021-06-30 ENCOUNTER — HOSPITAL ENCOUNTER (OUTPATIENT)
Dept: RADIATION ONCOLOGY | Facility: HOSPITAL | Age: 56
Discharge: HOME | End: 2021-06-30

## 2021-06-30 VITALS
HEIGHT: 68 IN | OXYGEN SATURATION: 99 % | SYSTOLIC BLOOD PRESSURE: 120 MMHG | HEART RATE: 74 BPM | BODY MASS INDEX: 32.74 KG/M2 | TEMPERATURE: 98.2 F | RESPIRATION RATE: 18 BRPM | WEIGHT: 216 LBS | DIASTOLIC BLOOD PRESSURE: 86 MMHG

## 2021-06-30 DIAGNOSIS — C55 MALIGNANT NEOPLASM OF UTERUS, UNSPECIFIED SITE (HCC): Primary | ICD-10-CM

## 2021-06-30 PROCEDURE — FACE2FACE: Performed by: RADIOLOGY

## 2021-06-30 PROCEDURE — 77014 CHG CT GUIDANCE RADIATION THERAPY FLDS PLACEMENT: CPT | Performed by: RADIOLOGY

## 2021-06-30 PROCEDURE — 77386: CPT | Performed by: RADIOLOGY

## 2021-06-30 NOTE — PROGRESS NOTES
"Patient Name: Xin Nj Date: 2021       : 1965        MRN #: [id number] Diagnosis:   Encounter Diagnosis   Name Primary?   • Malignant neoplasm of uterus, unspecified site (CMS/HCC) Yes              RADIATION ON TREATMENT VISIT NOTE - PELVIS    Treatment Summary      Treatment Site Ref. ID Energy Dose/Fx (cGy) #Fx Dose Correction (cGy) Total Dose (cGy) Start Date End Date Elapsed Days   Pelvis Init Pelvis 6X 180  0 3,600 2021 29     [id number], FIGO IB, carcinosarcoma of the uterus  [place] chemo-XRT-chemo is the plan  Did ok with first part of chemo  General:           Review of Systems    [] No new complaints [] Nocturia [] Slow urinary stream  [] Difficulty initiating urination [] Dysuria [] Vaginal discharge  [] Increased frequency of urination [x] Soft/loose stool [x] Diarrhea  [] Rectal burning  [] Skin soreness, location:   [x] Fatigue,  severity: 1 Relief w/ Rest  [x] Pain,  severity: 6, location: Lower abdomen  Bladder medication regimen: NONE   Pain medication regimen: Hydrocodone   Bowel regimen: NONE   Skin regimen: Aquaphor      Comments/Notes:  Hasn't needed imodium  Looser stools--up to 3 times per day, not liquid.    KPS: 80%       Vital Signs: /86   Pulse 74   Temp 98.2 °F (36.8 °C) (Oral)   Resp 18   Ht 172.7 cm (68\")   Wt 98 kg (216 lb)   SpO2 99%   BMI 32.84 kg/m²     Weight:   Wt Readings from Last 3 Encounters:   21 98 kg (216 lb)   21 98.5 kg (217 lb 3.2 oz)   21 98.4 kg (217 lb)       Medication:   Current Outpatient Medications:   •  albuterol sulfate HFA (Ventolin HFA) 108 (90 Base) MCG/ACT inhaler, Every 4 (Four) Hours., Disp: , Rfl:   •  ALPRAZolam XR 0.5 MG 24 hr tablet, TAKE ONE TABLET BY MOUTH EVERY MORNING AS NEEDED, Disp: , Rfl:   •  budesonide-formoterol (Symbicort) 160-4.5 MCG/ACT inhaler, Every 12 (Twelve) Hours., Disp: , Rfl:   •  calcium carbonate-vitamin d 600-400 MG-UNIT per tablet, calcium carbonate 600 mg " (1,500 mg)-vitamin D3 400 unit tablet  TAKE 1 TABLET BY MOUTH EVERY DAY WITH FOOD, Disp: , Rfl:   •  cholecalciferol (VITAMIN D3) 25 MCG (1000 UT) tablet, Take 1,000 Units by mouth Daily., Disp: , Rfl:   •  cloNIDine (CATAPRES) 0.2 MG tablet, clonidine HCl 0.2 mg tablet, Disp: , Rfl:   •  dexamethasone (DECADRON) 4 MG tablet, Take 2 tablets in the morning daily on days 2, 3 & 4.  Take with food., Disp: 6 tablet, Rfl: 5  •  diclofenac sodium (VOTAREN XR) 100 MG 24 hr tablet, diclofenac  mg tablet,extended release 24 hr, Disp: , Rfl:   •  dicyclomine (BENTYL) 20 MG tablet, Take 20 mg by mouth 3 (Three) Times a Day., Disp: , Rfl:   •  DULoxetine (CYMBALTA) 60 MG capsule, Take 60 mg by mouth Daily., Disp: , Rfl:   •  fluticasone-salmeterol (Advair HFA) 115-21 MCG/ACT inhaler, Every 12 (Twelve) Hours., Disp: , Rfl:   •  gabapentin (NEURONTIN) 800 MG tablet, Take 800 mg by mouth 3 (Three) Times a Day., Disp: , Rfl:   •  HYDROcodone-acetaminophen (NORCO)  MG per tablet, TAKE ONE TABLET BY MOUTH EVERY 8 HOURS AS NEEDED FOR 7 DAYS, Disp: , Rfl:   •  lidocaine-prilocaine (EMLA) 2.5-2.5 % cream, Apply  topically to the appropriate area as directed As Needed (prior to accessing port)., Disp: 30 g, Rfl: 2  •  loratadine (CLARITIN) 10 MG tablet, Take 10 mg by mouth Daily., Disp: , Rfl:   •  omeprazole (priLOSEC) 40 MG capsule, Take 1 capsule by mouth Daily., Disp: 30 capsule, Rfl: 6  •  ondansetron (ZOFRAN) 8 MG tablet, Take 1 tablet by mouth Every 8 (Eight) Hours As Needed for Nausea or Vomiting., Disp: 30 tablet, Rfl: 3  •  promethazine (PHENERGAN) 12.5 MG tablet, Take 12.5 mg by mouth Every 12 (Twelve) Hours As Needed., Disp: , Rfl:   •  sennosides-docusate (PERICOLACE) 8.6-50 MG per tablet, , Disp: , Rfl:   •  TiZANidine (ZANAFLEX) 4 MG capsule, Take 4 mg by mouth 3 (Three) Times a Day As Needed., Disp: , Rfl:   •  vitamin B-6 (PYRIDOXINE) 100 MG tablet, Take 1 tablet by mouth 2 (two) times a day., Disp: 60  tablet, Rfl: 5  No current facility-administered medications for this visit.       LABS (Reviewed):  Hematology WBC   Date Value Ref Range Status   06/29/2021 4.80 3.40 - 10.80 10*3/mm3 Final     RBC   Date Value Ref Range Status   06/29/2021 3.44 (L) 3.77 - 5.28 10*6/mm3 Final     Hemoglobin   Date Value Ref Range Status   06/29/2021 11.7 (L) 12.0 - 15.9 g/dL Final     Hematocrit   Date Value Ref Range Status   06/29/2021 36.8 34.0 - 46.6 % Final     Platelets   Date Value Ref Range Status   06/29/2021 154 140 - 450 10*3/mm3 Final      Chemistry   Lab Results   Component Value Date    GLUCOSE 100 (H) 04/30/2021    BUN 16 04/30/2021    CREATININE 0.70 04/30/2021    EGFRIFNONA 86 04/30/2021    BCR 22.9 04/30/2021    K 3.8 04/30/2021    CO2 27.0 04/30/2021    CALCIUM 8.7 04/30/2021    ALBUMIN 3.60 04/30/2021    AST 13 04/30/2021    ALT 13 04/30/2021         Physical Exam:         Abdomen:  [x] Soft   [x] Bowel sounds  GYN:   [x] No Vaginal bleeding or discharge  Extremities:  [] Edema  GI:   [] Diarrhea  [] Enteritis     [] Proctitis  [] Vomiting  Renal/Genitourinary: [] Bladder spasms [] Cystitis     [] Incontinence  Skin:   [x] ndGndrndanddndend:nd nd2nd Comments/Notes:     [x] Review of labs, images, dosimetry, dose delivered, & treatment parameters.    Comments:     [x] Patient treatment setup reviewed.    Comments:     Recommendations: Will contact PCP about pain medication needs; current dose is controlling pain.  Imodium discussed.    [x] Continue RT  [] Change RT Plan [] Hold RT, length:        Approved Electronically By:  Kristofer Glover MD, 6/30/2021, 13:51 EDT          Confidentiality of this medical record shall be maintained except when use or disclosure is required or permitted by law, regulation or written authorization by the patient.

## 2021-07-01 ENCOUNTER — HOSPITAL ENCOUNTER (OUTPATIENT)
Dept: RADIATION ONCOLOGY | Facility: HOSPITAL | Age: 56
Setting detail: RADIATION/ONCOLOGY SERIES
End: 2021-07-01

## 2021-07-01 ENCOUNTER — HOSPITAL ENCOUNTER (OUTPATIENT)
Dept: RADIATION ONCOLOGY | Facility: HOSPITAL | Age: 56
Setting detail: RADIATION/ONCOLOGY SERIES
Discharge: HOME | End: 2021-07-01

## 2021-07-01 ENCOUNTER — DOCUMENTATION (OUTPATIENT)
Dept: RADIATION ONCOLOGY | Facility: HOSPITAL | Age: 56
End: 2021-07-01

## 2021-07-01 PROCEDURE — 77386: CPT | Performed by: RADIOLOGY

## 2021-07-01 PROCEDURE — 77014 CHG CT GUIDANCE RADIATION THERAPY FLDS PLACEMENT: CPT | Performed by: RADIOLOGY

## 2021-07-01 NOTE — PROGRESS NOTES
Radiation Oncology Nurse Note    Patient stated during OTV with Dr. Glover yesterday 6/30/21 that her PCP was not going to refill her pain medication due to patient missing her scheduled pill count on Tuesday 6/29/21. Patient said she was unable to answer their phone call because she was in her appointment with Dr. Neal. Patient said she only has enough pain medication to last her through Sunday.     Called and spoke with office staff yesterday at Dr. Dubose's office regarding patient's pain medication refill. Was told they attempted to contact patient twice on Tuesday but there was no answer and patient's VM wasn't set up. They stressed the importance of being able to contact patient for those appointments to prevent unnecessary calls after hours or on weekends. Verbalized understanding but stressed the importance of this patient being able to receive pain medication in light of her cancer diagnosis and current treatments. Staff member said she would address with PCP and reach out to the patient. Provided return call back number in case they had further questions or concerns.    Today 7/1/21 patient stated she had not heard from PCPs office either yesterday or today regarding her pain medication. Stated I would reach out to them again and ask about the status. Attempted to contact their office this afternoon at 2:47PM but received a message that I was calling after hours. Returned call to patient and let her know I was not able to address the issue this afternoon but would call their office in the morning. Patient verbalized understanding and agreed with plan.    Fela Garcia RN, BSN  Radiation Oncology Department  [facility]  [phone]  Office  [phone]  Fax

## 2021-07-02 ENCOUNTER — HOSPITAL ENCOUNTER (OUTPATIENT)
Dept: RADIATION ONCOLOGY | Facility: HOSPITAL | Age: 56
Discharge: HOME | End: 2021-07-02

## 2021-07-02 DIAGNOSIS — C54.1 MALIGNANT NEOPLASM OF ENDOMETRIUM (HCC): Primary | ICD-10-CM

## 2021-07-02 PROCEDURE — 77386: CPT | Performed by: RADIOLOGY

## 2021-07-02 PROCEDURE — 77014 CHG CT GUIDANCE RADIATION THERAPY FLDS PLACEMENT: CPT | Performed by: RADIOLOGY

## 2021-07-02 RX ORDER — HYDROCODONE BITARTRATE AND ACETAMINOPHEN 10; 325 MG/1; MG/1
1 TABLET ORAL EVERY 6 HOURS PRN
Qty: 28 TABLET | Refills: 0 | Status: SHIPPED | OUTPATIENT
Start: 2021-07-02 | End: 2021-07-09 | Stop reason: SDUPTHER

## 2021-07-06 PROCEDURE — 77014 CHG CT GUIDANCE RADIATION THERAPY FLDS PLACEMENT: CPT | Performed by: RADIOLOGY

## 2021-07-06 PROCEDURE — 77386: CPT | Performed by: RADIOLOGY

## 2021-07-07 ENCOUNTER — HOSPITAL ENCOUNTER (OUTPATIENT)
Dept: RADIATION ONCOLOGY | Facility: HOSPITAL | Age: 56
Discharge: HOME | End: 2021-07-07

## 2021-07-07 ENCOUNTER — RADIATION ONCOLOGY WEEKLY ASSESSMENT (OUTPATIENT)
Dept: RADIATION ONCOLOGY | Facility: HOSPITAL | Age: 56
End: 2021-07-07

## 2021-07-07 VITALS
OXYGEN SATURATION: 99 % | BODY MASS INDEX: 33.37 KG/M2 | SYSTOLIC BLOOD PRESSURE: 127 MMHG | DIASTOLIC BLOOD PRESSURE: 88 MMHG | TEMPERATURE: 98 F | RESPIRATION RATE: 18 BRPM | HEIGHT: 68 IN | HEART RATE: 86 BPM | WEIGHT: 220.2 LBS

## 2021-07-07 DIAGNOSIS — C55 MALIGNANT NEOPLASM OF UTERUS, UNSPECIFIED SITE (HCC): Primary | ICD-10-CM

## 2021-07-07 DIAGNOSIS — R30.0 DYSURIA: ICD-10-CM

## 2021-07-07 PROCEDURE — 77386: CPT | Performed by: RADIOLOGY

## 2021-07-07 PROCEDURE — 77014 CHG CT GUIDANCE RADIATION THERAPY FLDS PLACEMENT: CPT | Performed by: RADIOLOGY

## 2021-07-07 PROCEDURE — FACE2FACE: Performed by: RADIOLOGY

## 2021-07-07 RX ORDER — GABAPENTIN 600 MG/1
600 TABLET ORAL 3 TIMES DAILY
COMMUNITY
Start: 2021-06-29

## 2021-07-07 NOTE — PROGRESS NOTES
"Patient Name: Xin Nj Date: 2021       : 1965        MRN #: [id number] Diagnosis:   Encounter Diagnosis   Name Primary?   • Malignant neoplasm of uterus, unspecified site (CMS/HCC) Yes                 RADIATION ON TREATMENT VISIT NOTE - PELVIS    Treatment Summary      Treatment Site Ref. ID Energy Dose/Fx (cGy) #Fx Dose Correction (cGy) Total Dose (cGy) Start Date End Date Elapsed Days   Pelvis Init Pelvis 6X 180 24 / 25 0 4,320 2021 36     [id number], FIGO IB, carcinosarcoma of the uterus  [place] chemo-XRT-chemo is the plan  Did ok with first part of chemo  3 fraction boost is finishing planning; total fractions will be 28.  General:           Review of Systems    [] No new complaints [] Nocturia [] Slow urinary stream  [] Difficulty initiating urination [] Dysuria [] Vaginal discharge  [] Increased frequency of urination [x] Soft/loose stool [x] Diarrhea  [] Rectal burning  [] Skin soreness, location:   [x] Fatigue,  severity: 1 Relief w/ Rest  [x] Pain,  severity:  , location: 8/10 lower abdominal pain  Bladder medication regimen: NONE   Pain medication regimen: Hydrocodone Norco 10mg  Bowel regimen: Imodium   Skin regimen: Aquaphor     Comments/Notes:  Patient to keep getting pain meds through our office during our treatments; then will be back with Dr. Dubose.    KPS: 90%       Vital Signs: /88   Pulse 86   Temp 98 °F (36.7 °C) (Oral)   Resp 18   Ht 172.7 cm (68\")   Wt 99.9 kg (220 lb 3.2 oz)   SpO2 99%   BMI 33.48 kg/m²     Weight:   Wt Readings from Last 3 Encounters:   21 99.9 kg (220 lb 3.2 oz)   21 98 kg (216 lb)   21 98.5 kg (217 lb 3.2 oz)       Medication:   Current Outpatient Medications:   •  albuterol sulfate HFA (Ventolin HFA) 108 (90 Base) MCG/ACT inhaler, Every 4 (Four) Hours., Disp: , Rfl:   •  ALPRAZolam XR 0.5 MG 24 hr tablet, TAKE ONE TABLET BY MOUTH EVERY MORNING AS NEEDED, Disp: , Rfl:   •  budesonide-formoterol (Symbicort) " 160-4.5 MCG/ACT inhaler, Every 12 (Twelve) Hours., Disp: , Rfl:   •  calcium carbonate-vitamin d 600-400 MG-UNIT per tablet, calcium carbonate 600 mg (1,500 mg)-vitamin D3 400 unit tablet  TAKE 1 TABLET BY MOUTH EVERY DAY WITH FOOD, Disp: , Rfl:   •  cholecalciferol (VITAMIN D3) 25 MCG (1000 UT) tablet, Take 1,000 Units by mouth Daily., Disp: , Rfl:   •  cloNIDine (CATAPRES) 0.2 MG tablet, clonidine HCl 0.2 mg tablet, Disp: , Rfl:   •  dexamethasone (DECADRON) 4 MG tablet, Take 2 tablets in the morning daily on days 2, 3 & 4.  Take with food., Disp: 6 tablet, Rfl: 5  •  diclofenac sodium (VOTAREN XR) 100 MG 24 hr tablet, diclofenac  mg tablet,extended release 24 hr, Disp: , Rfl:   •  dicyclomine (BENTYL) 20 MG tablet, Take 20 mg by mouth 3 (Three) Times a Day., Disp: , Rfl:   •  DULoxetine (CYMBALTA) 60 MG capsule, Take 60 mg by mouth Daily., Disp: , Rfl:   •  fluticasone-salmeterol (Advair HFA) 115-21 MCG/ACT inhaler, Every 12 (Twelve) Hours., Disp: , Rfl:   •  gabapentin (NEURONTIN) 600 MG tablet, Take 600 mg by mouth 3 (Three) Times a Day., Disp: , Rfl:   •  gabapentin (NEURONTIN) 800 MG tablet, Take 800 mg by mouth 3 (Three) Times a Day., Disp: , Rfl:   •  HYDROcodone-acetaminophen (NORCO)  MG per tablet, Take 1 tablet by mouth Every 6 (Six) Hours As Needed for Moderate Pain  for up to 7 days., Disp: 28 tablet, Rfl: 0  •  lidocaine-prilocaine (EMLA) 2.5-2.5 % cream, Apply  topically to the appropriate area as directed As Needed (prior to accessing port)., Disp: 30 g, Rfl: 2  •  loratadine (CLARITIN) 10 MG tablet, Take 10 mg by mouth Daily., Disp: , Rfl:   •  omeprazole (priLOSEC) 40 MG capsule, Take 1 capsule by mouth Daily., Disp: 30 capsule, Rfl: 6  •  ondansetron (ZOFRAN) 8 MG tablet, Take 1 tablet by mouth Every 8 (Eight) Hours As Needed for Nausea or Vomiting., Disp: 30 tablet, Rfl: 3  •  promethazine (PHENERGAN) 12.5 MG tablet, Take 12.5 mg by mouth Every 12 (Twelve) Hours As Needed., Disp: ,  Rfl:   •  sennosides-docusate (PERICOLACE) 8.6-50 MG per tablet, , Disp: , Rfl:   •  TiZANidine (ZANAFLEX) 4 MG capsule, Take 4 mg by mouth 3 (Three) Times a Day As Needed., Disp: , Rfl:   •  vitamin B-6 (PYRIDOXINE) 100 MG tablet, Take 1 tablet by mouth 2 (two) times a day., Disp: 60 tablet, Rfl: 5       LABS (Reviewed):  Hematology WBC   Date Value Ref Range Status   06/29/2021 4.80 3.40 - 10.80 10*3/mm3 Final     RBC   Date Value Ref Range Status   06/29/2021 3.44 (L) 3.77 - 5.28 10*6/mm3 Final     Hemoglobin   Date Value Ref Range Status   06/29/2021 11.7 (L) 12.0 - 15.9 g/dL Final     Hematocrit   Date Value Ref Range Status   06/29/2021 36.8 34.0 - 46.6 % Final     Platelets   Date Value Ref Range Status   06/29/2021 154 140 - 450 10*3/mm3 Final      Chemistry   Lab Results   Component Value Date    GLUCOSE 100 (H) 04/30/2021    BUN 16 04/30/2021    CREATININE 0.70 04/30/2021    EGFRIFNONA 86 04/30/2021    BCR 22.9 04/30/2021    K 3.8 04/30/2021    CO2 27.0 04/30/2021    CALCIUM 8.7 04/30/2021    ALBUMIN 3.60 04/30/2021    AST 13 04/30/2021    ALT 13 04/30/2021         Physical Exam:         Abdomen:  [x] Soft   [x] Bowel sounds  GYN:   [x] No Vaginal discharge  Extremities:  [] Edema  GI:   [x] Diarrhea--imodium helps  [] Enteritis     [] Proctitis  [] Vomiting  Renal/Genitourinary: [] Bladder spasms [] Cystitis     [] Incontinence  Skin:   [x] ndGndrndanddndend:nd nd2nd Comments/Notes: + dysuria and urinary frequency    [x] Review of labs, images, dosimetry, dose delivered, & treatment parameters.    Comments:     [x] Patient treatment setup reviewed.    Comments:     Recommendations: UA and reflex culture  Pain meds did help     [x] Continue RT  [] Change RT Plan [] Hold RT, length:        Approved Electronically By:  Kristofer Glover MD, 7/7/2021, 11:25 EDT          Confidentiality of this medical record shall be maintained except when use or disclosure is required or permitted by law, regulation or written authorization by the  patient.

## 2021-07-08 ENCOUNTER — HOSPITAL ENCOUNTER (OUTPATIENT)
Dept: RADIATION ONCOLOGY | Facility: HOSPITAL | Age: 56
Discharge: HOME | End: 2021-07-08

## 2021-07-08 ENCOUNTER — LAB (OUTPATIENT)
Dept: LAB | Facility: HOSPITAL | Age: 56
End: 2021-07-08

## 2021-07-08 DIAGNOSIS — R30.0 DYSURIA: ICD-10-CM

## 2021-07-08 DIAGNOSIS — C55 MALIGNANT NEOPLASM OF UTERUS, UNSPECIFIED SITE (HCC): ICD-10-CM

## 2021-07-08 LAB
BACTERIA UR QL AUTO: NORMAL /HPF
BILIRUB UR QL STRIP: NEGATIVE
CLARITY UR: CLEAR
COLOR UR: YELLOW
GLUCOSE UR STRIP-MCNC: NEGATIVE MG/DL
HGB UR QL STRIP.AUTO: ABNORMAL
HYALINE CASTS UR QL AUTO: NORMAL /LPF
KETONES UR QL STRIP: NEGATIVE
LEUKOCYTE ESTERASE UR QL STRIP.AUTO: NEGATIVE
NITRITE UR QL STRIP: NEGATIVE
PH UR STRIP.AUTO: 7 [PH] (ref 5–8)
PROT UR QL STRIP: NEGATIVE
RBC # UR: NORMAL /HPF
REF LAB TEST METHOD: NORMAL
SP GR UR STRIP: 1.01 (ref 1–1.03)
SQUAMOUS #/AREA URNS HPF: NORMAL /HPF
UROBILINOGEN UR QL STRIP: ABNORMAL
WBC UR QL AUTO: NORMAL /HPF

## 2021-07-08 PROCEDURE — 77338 DESIGN MLC DEVICE FOR IMRT: CPT | Performed by: RADIOLOGY

## 2021-07-08 PROCEDURE — 77427 RADIATION TX MANAGEMENT X5: CPT | Performed by: RADIOLOGY

## 2021-07-08 PROCEDURE — 77014 CHG CT GUIDANCE RADIATION THERAPY FLDS PLACEMENT: CPT | Performed by: RADIOLOGY

## 2021-07-08 PROCEDURE — 77386: CPT | Performed by: RADIOLOGY

## 2021-07-08 PROCEDURE — 77300 RADIATION THERAPY DOSE PLAN: CPT | Performed by: RADIOLOGY

## 2021-07-08 PROCEDURE — 77336 RADIATION PHYSICS CONSULT: CPT | Performed by: RADIOLOGY

## 2021-07-08 PROCEDURE — 81001 URINALYSIS AUTO W/SCOPE: CPT

## 2021-07-09 DIAGNOSIS — C54.1 MALIGNANT NEOPLASM OF ENDOMETRIUM (HCC): ICD-10-CM

## 2021-07-09 PROCEDURE — 77336 RADIATION PHYSICS CONSULT: CPT | Performed by: RADIOLOGY

## 2021-07-09 PROCEDURE — 77014 CHG CT GUIDANCE RADIATION THERAPY FLDS PLACEMENT: CPT | Performed by: RADIOLOGY

## 2021-07-09 PROCEDURE — 77386: CPT | Performed by: RADIOLOGY

## 2021-07-09 RX ORDER — HYDROCODONE BITARTRATE AND ACETAMINOPHEN 10; 325 MG/1; MG/1
1 TABLET ORAL EVERY 6 HOURS PRN
Qty: 28 TABLET | Refills: 0 | Status: SHIPPED | OUTPATIENT
Start: 2021-07-09 | End: 2021-07-16 | Stop reason: SDUPTHER

## 2021-07-12 ENCOUNTER — HOSPITAL ENCOUNTER (OUTPATIENT)
Dept: RADIATION ONCOLOGY | Facility: HOSPITAL | Age: 56
Discharge: HOME | End: 2021-07-12

## 2021-07-12 PROCEDURE — 77386: CPT | Performed by: RADIOLOGY

## 2021-07-12 PROCEDURE — 77014 CHG CT GUIDANCE RADIATION THERAPY FLDS PLACEMENT: CPT | Performed by: RADIOLOGY

## 2021-07-13 ENCOUNTER — RADIATION ONCOLOGY WEEKLY ASSESSMENT (OUTPATIENT)
Dept: RADIATION ONCOLOGY | Facility: HOSPITAL | Age: 56
End: 2021-07-13

## 2021-07-13 VITALS
BODY MASS INDEX: 33.04 KG/M2 | DIASTOLIC BLOOD PRESSURE: 72 MMHG | SYSTOLIC BLOOD PRESSURE: 137 MMHG | HEIGHT: 68 IN | WEIGHT: 218 LBS | TEMPERATURE: 98.2 F | HEART RATE: 79 BPM | RESPIRATION RATE: 18 BRPM | OXYGEN SATURATION: 100 %

## 2021-07-13 DIAGNOSIS — C55 MALIGNANT NEOPLASM OF UTERUS, UNSPECIFIED SITE (HCC): Primary | ICD-10-CM

## 2021-07-13 PROCEDURE — 77014 CHG CT GUIDANCE RADIATION THERAPY FLDS PLACEMENT: CPT | Performed by: RADIOLOGY

## 2021-07-13 PROCEDURE — 77427 RADIATION TX MANAGEMENT X5: CPT | Performed by: RADIOLOGY

## 2021-07-13 PROCEDURE — FACE2FACE: Performed by: RADIOLOGY

## 2021-07-13 PROCEDURE — 77386: CPT | Performed by: RADIOLOGY

## 2021-07-13 NOTE — PROGRESS NOTES
RADIATION THERAPY DISCHARGE INSTRUCTIONS    Xin Nj completed 28/28 radiation treatments for treatment of uterine cancer. The following instructions were provided to the patient and/or family in their printed after visit summary (AVS) as well as discussed in-person with the radiation oncology nurse. The patient and/or family member had the opportunity to ask questions and verbalized their questions were adequately answered. Patient is scheduled for one-month follow-up appointment with Dr. Kristofer Glover on 8/16/21 at 1:30PM.     DIET  [x]  Eat a regular, well balanced diet that is high in protein such as meat, eggs, cheese, and nut butters  [x]  Drink 48 to 64 ounces of fluid daily  []  Drink lots of fluids to help decrease thick oral secretions  [x]  Use nutritional supplements if you are not able to eat full meals  [x]  Monitor your weight and report continued weight loss to your doctor    MEDICATIONS  [x]  Use Tylenol as needed to decrease discomfort and irritation to treatment area  []  Use Tylenol as needed to decrease breast discomfort and irritation due to swelling and skin reaction  []  Take pain medications only as prescribed  [x]  Take a laxative or stool softener as needed to prevent constipation due to pain medications  []  Use a synthetic saliva product (such as Salivart®, Glandosane®, or MouthKote®) as needed to alleviate dry mouth or throat  []  Use MuGard® or other oral pain relief medication before meals and before taking medications as needed to ease the discomfort of swallowing  []  Use an over-the-counter decongestant (such as Sudafed®) if your ears feel plugged  [x]  Use Imodium (up to 8 pills per day) as needed for loose stools    SKIN CARE  [x]  Wash treated skin gently with your hands using a mild, non-drying soap such as Dove® or Aveeno® until skin returns to normal  [x]  Pat skin dry - do not rub  [x]  Keep treated skin moist with frequent applications of Aquaphor® or unscented lotion  (such as Eucerin)®  [x]  Always protect your treated skin when outdoors by wearing protective clothing and applying sunscreen SPF 15 or higher at least 30 minutes before going outdoors and reapply frequently  []  Resume use of deodorant to the armpit of the affected arm when skin reactions improve    ORAL CARE  []  Continue oral rinses as directed until mouth soreness goes away  []  Avoid using commercial mouthwash that contains alcohol  []  Rinse mouth with salt and soda solution: 2 teaspoons salt and 2 teaspoons baking soda dissolved in 8 ounces warm water  []  Perform oral care twice daily and after each meal using a soft toothbrush  []  Continue regular visits to your dentist and follow guidelines to care for your teeth    ACTIVITY  [x]  Fatigue is a normal side effect of radiation therapy and should improve over time  [x]  Alternate rest and activity  [x]  Exercise such as walking may help to improve your fatigue    FOLLOW-UP  [x]  Continue follow-up appointments with all other doctors as necessary  []  Continue to have regular mammograms as ordered by your physician  []  Ensure you have a PSA level drawn at [facility] laboratory (no appointment necessary) at least two days prior to your one month follow-up appointment with Dr. Glover  [x]  Call your radiation oncology doctor if you are concerned with any side effects you are experiencing    SPECIAL INSTRUCTIONS  []  Continue all range of motion and mobility exercise as instructed  []  Never allow blood draws or blood pressures to be taken on your affected arm unless in an emergency situation  []  Practice careful nail care and avoid open skin to your affected arm and hand  []  Call your physician if you notice swelling of your affected arm or hand that is unusual or doesn't go away  []  Do not stop taking your steroid medication suddenly; your medical or radiation oncologist will slowly decrease your dose to prevent any adverse effects.  []  Do not  drive until your doctor has said it's okay to do so  [x]  Reintroduce fiber into your diet slowly so you will know which foods cause loose stools or cramps  [x]  Use sitz baths as directed  [x]  Side effects should subside in a couple weeks; tell your doctor if you have increased burning, frequency, or blood in your urine or stool  _______________________________________________________________________    Completed by: Fela Garcia RN BSN, Radiation Oncology Nurse on 07/13/2021  at 14:21 EDT

## 2021-07-13 NOTE — PATIENT INSTRUCTIONS
RADIATION THERAPY DISCHARGE INSTRUCTIONS  Pelvis    CONGRATULATIONS! You completed 28 radiation treatments for treatment of your uterine cancer. These discharge instructions are important for you to follow until your one-month follow up appointment with Dr. Kristofer Glover. Please make sure to review these instructions and call the Radiation Oncology Department if you have any questions or concerns with symptoms you may experience. Thank you for trusting us with your cancer treatment!    DIET  • Eat a regular, well balanced diet that is high in protein such as meat, eggs, cheese, and nut butters  • Drink 48 to 64 ounces of fluid daily  • Use nutritional supplements if you are not able to eat full meals  • Monitor your weight and report continued weight loss to your doctor    MEDICATIONS  • Use Tylenol as needed to decrease discomfort and irritation to treatment area  • Take pain medications only as prescribed  • Take a laxative or stool softener as needed to prevent constipation due to pain medications  • Use Imodium (up to 8 pills per day) as needed for loose stools    SKIN CARE  • Wash treated skin gently with your hands using a mild, non-drying soap such as Dove® or Aveeno® until skin returns to normal  • Pat skin dry - do not rub  • Keep treated skin moist with frequent applications of Aquaphor® or unscented lotion (such as Eucerin)®  • Always protect your treated skin when outdoors by wearing protective clothing and applying sunscreen SPF 15 or higher at least 30 minutes before going outdoors and reapply frequently    ACTIVITY  Fatigue is a normal side effect of radiation therapy and should improve over time  • Alternate rest and activity  • Exercise such as walking may help to improve your fatigue    FOLLOW-UP  • Continue follow-up appointments with all other doctors as necessary  • Call your radiation oncology doctor if you are concerned with any side effects you are experiencing: [phone]    SPECIAL  INSTRUCTIONS  • Reintroduce fiber into your diet slowly so you will know which foods cause loose stools or cramps  • Use sitz baths as directed  • Side effects should subside in a couple weeks; tell your doctor if you have increased burning, frequency, or blood in your urine or stool    _______________________________________________________________________    Completed by: Fela Garcia RN BSN, Radiation Oncology Nurse on 07/13/2021  at 07:48 EDT

## 2021-07-13 NOTE — PROGRESS NOTES
"Patient Name: Xin Nj Date: 2021       : 1965        MRN #: [id number] Diagnosis:   Encounter Diagnosis   Name Primary?   • Malignant neoplasm of uterus, unspecified site (CMS/HCC) Yes                   RADIATION ON TREATMENT VISIT NOTE - PELVIS    Treatment Summary    Treatment Site Ref. ID Energy Dose/Fx (cGy) #Fx Dose Correction (cGy) Total Dose (cGy) Start Date End Date Elapsed Days   Pelvis Init Pelvis 6X 180  / 25 0 4,500 2021 37   Pelvis Bst Pelvis Bst 6X 180 3 / 3 0 540 2021 4     [id number], FIGO IB, carcinosarcoma of the uterus  [place] chemo-XRT-chemo   Did ok with chemo  Completed radiation therapy today.  General:           Review of Systems    [] No new complaints [] Nocturia [] Slow urinary stream  [] Difficulty initiating urination [] Dysuria [] Vaginal discharge  [] Increased frequency of urination [x] Soft/loose stool [x] Diarrhea  [] Rectal burning  [] Skin soreness, location:   [x] Fatigue,  severity: 1 Relief w/ Rest  [x] Pain,  severity: 4, location: pain meds through use for PCP last two weeks; restarts with PCP on Friday   Bladder medication regimen: NONE   Pain medication regimen: Hydrocodone   Bowel regimen: Imodium   Skin regimen: Aquaphor     Comments/Notes:      KPS: 80%       Vital Signs: /72   Pulse 79   Temp 98.2 °F (36.8 °C) (Oral)   Resp 18   Ht 172.7 cm (68\")   Wt 98.9 kg (218 lb)   SpO2 100%   BMI 33.15 kg/m²     Weight:   Wt Readings from Last 3 Encounters:   21 98.9 kg (218 lb)   21 99.9 kg (220 lb 3.2 oz)   21 98 kg (216 lb)       Medication:   Current Outpatient Medications:   •  albuterol sulfate HFA (Ventolin HFA) 108 (90 Base) MCG/ACT inhaler, Every 4 (Four) Hours., Disp: , Rfl:   •  ALPRAZolam XR 0.5 MG 24 hr tablet, TAKE ONE TABLET BY MOUTH EVERY MORNING AS NEEDED, Disp: , Rfl:   •  budesonide-formoterol (Symbicort) 160-4.5 MCG/ACT inhaler, Every 12 (Twelve) Hours., Disp: , Rfl:   •  calcium " carbonate-vitamin d 600-400 MG-UNIT per tablet, calcium carbonate 600 mg (1,500 mg)-vitamin D3 400 unit tablet  TAKE 1 TABLET BY MOUTH EVERY DAY WITH FOOD, Disp: , Rfl:   •  cholecalciferol (VITAMIN D3) 25 MCG (1000 UT) tablet, Take 1,000 Units by mouth Daily., Disp: , Rfl:   •  cloNIDine (CATAPRES) 0.2 MG tablet, clonidine HCl 0.2 mg tablet, Disp: , Rfl:   •  dexamethasone (DECADRON) 4 MG tablet, Take 2 tablets in the morning daily on days 2, 3 & 4.  Take with food., Disp: 6 tablet, Rfl: 5  •  diclofenac sodium (VOTAREN XR) 100 MG 24 hr tablet, diclofenac  mg tablet,extended release 24 hr, Disp: , Rfl:   •  dicyclomine (BENTYL) 20 MG tablet, Take 20 mg by mouth 3 (Three) Times a Day., Disp: , Rfl:   •  DULoxetine (CYMBALTA) 60 MG capsule, Take 60 mg by mouth Daily., Disp: , Rfl:   •  fluticasone-salmeterol (Advair HFA) 115-21 MCG/ACT inhaler, Every 12 (Twelve) Hours., Disp: , Rfl:   •  gabapentin (NEURONTIN) 600 MG tablet, Take 600 mg by mouth 3 (Three) Times a Day., Disp: , Rfl:   •  gabapentin (NEURONTIN) 800 MG tablet, Take 800 mg by mouth 3 (Three) Times a Day., Disp: , Rfl:   •  HYDROcodone-acetaminophen (NORCO)  MG per tablet, Take 1 tablet by mouth Every 6 (Six) Hours As Needed for Moderate Pain  for up to 7 days., Disp: 28 tablet, Rfl: 0  •  lidocaine-prilocaine (EMLA) 2.5-2.5 % cream, Apply  topically to the appropriate area as directed As Needed (prior to accessing port)., Disp: 30 g, Rfl: 2  •  loratadine (CLARITIN) 10 MG tablet, Take 10 mg by mouth Daily., Disp: , Rfl:   •  omeprazole (priLOSEC) 40 MG capsule, Take 1 capsule by mouth Daily., Disp: 30 capsule, Rfl: 6  •  ondansetron (ZOFRAN) 8 MG tablet, Take 1 tablet by mouth Every 8 (Eight) Hours As Needed for Nausea or Vomiting., Disp: 30 tablet, Rfl: 3  •  promethazine (PHENERGAN) 12.5 MG tablet, Take 12.5 mg by mouth Every 12 (Twelve) Hours As Needed., Disp: , Rfl:   •  sennosides-docusate (PERICOLACE) 8.6-50 MG per tablet, , Disp: ,  Rfl:   •  TiZANidine (ZANAFLEX) 4 MG capsule, Take 4 mg by mouth 3 (Three) Times a Day As Needed., Disp: , Rfl:   •  vitamin B-6 (PYRIDOXINE) 100 MG tablet, Take 1 tablet by mouth 2 (two) times a day., Disp: 60 tablet, Rfl: 5       LABS (Reviewed):  Hematology WBC   Date Value Ref Range Status   06/29/2021 4.80 3.40 - 10.80 10*3/mm3 Final     RBC   Date Value Ref Range Status   06/29/2021 3.44 (L) 3.77 - 5.28 10*6/mm3 Final     Hemoglobin   Date Value Ref Range Status   06/29/2021 11.7 (L) 12.0 - 15.9 g/dL Final     Hematocrit   Date Value Ref Range Status   06/29/2021 36.8 34.0 - 46.6 % Final     Platelets   Date Value Ref Range Status   06/29/2021 154 140 - 450 10*3/mm3 Final      Chemistry   Lab Results   Component Value Date    GLUCOSE 100 (H) 04/30/2021    BUN 16 04/30/2021    CREATININE 0.70 04/30/2021    EGFRIFNONA 86 04/30/2021    BCR 22.9 04/30/2021    K 3.8 04/30/2021    CO2 27.0 04/30/2021    CALCIUM 8.7 04/30/2021    ALBUMIN 3.60 04/30/2021    AST 13 04/30/2021    ALT 13 04/30/2021         Physical Exam:         Abdomen:  [x] Soft   [x] Bowel sounds  GYN:   [x] No Vaginal discharge or bleeding.  Extremities:  [] Edema  GI:   [x] Diarrhea  [] Enteritis     [] Proctitis  [] Vomiting  Renal/Genitourinary: [] Bladder spasms [] Cystitis     [] Incontinence  Skin:   [x] Grade: 1no desquamation.  Comments/Notes:     [x] Review of labs, images, dosimetry, dose delivered, & treatment parameters.    Comments:     [x] Patient treatment setup reviewed.    Comments:     Recommendations:Referring back to med/onc for next steps (7/29/2021)  Continue supportive measures.  PCP f/u on 7/16/2021 who will be resuming pain management.    [] Continue RT  [] Change RT Plan [] Hold RT, length:        Approved Electronically By:  Kristofer Glover MD, 7/13/2021, 13:59 EDT          Confidentiality of this medical record shall be maintained except when use or disclosure is required or permitted by law, regulation or written  authorization by the patient.

## 2021-07-16 DIAGNOSIS — C54.1 MALIGNANT NEOPLASM OF ENDOMETRIUM (HCC): ICD-10-CM

## 2021-07-16 RX ORDER — HYDROCODONE BITARTRATE AND ACETAMINOPHEN 10; 325 MG/1; MG/1
1 TABLET ORAL EVERY 6 HOURS PRN
Qty: 28 TABLET | Refills: 0 | Status: SHIPPED | OUTPATIENT
Start: 2021-07-16 | End: 2021-07-16 | Stop reason: SDUPTHER

## 2021-07-16 RX ORDER — HYDROCODONE BITARTRATE AND ACETAMINOPHEN 10; 325 MG/1; MG/1
1 TABLET ORAL EVERY 6 HOURS PRN
Qty: 28 TABLET | Refills: 0 | Status: SHIPPED | OUTPATIENT
Start: 2021-07-16

## 2021-07-30 NOTE — PROGRESS NOTES
Patient Name: Xin Nj              Date: 2021  : 1965       MRN: [id number]     Referring Physician: Juliana Neal MD  DX:   Encounter Diagnosis   Name Primary?   • Malignant neoplasm of uterus, unspecified site (CMS/HCC) Yes        COMPLETION NOTE      Primary Radiation Oncologist: Kristofer Glover MD    PRIMARY SITE AND HISTOPATHOLOGY:   Carcinosarcoma of the Uterus      STAGE: [id number], FIGO IB      SIGNIFICANT LAB AND X-RAY FINDINGS: Planned [place] chemo-XRT-chemo      PLAN OF TREATMENT: adjuvant as part of curative intent      DATE STARTED: 2021   DATE COMPLETED:  2021      TOTAL DOSE: 50.4 Gy in 28 fractions   DOSE/FRACTION: 1.8 Gy per fraction  ENERGY: 6X IMRT/IGRT course   STATUS OF TUMOR/PATIENT AT COMPLETION OF RADIOTHERAPY: No unexpected toxicities or treatment breaks      TOLERANCE: Grade I diarrhea, grade I fatigue.  XRT related pain/cramping.  Pains meds have helped.  Working with PCP for her to take back over her pain med Rx.      DISPOSITION: 1 month FU here; to restart final chemotherapy leg of adjuvant course.      Current Outpatient Medications:   •  albuterol sulfate HFA (Ventolin HFA) 108 (90 Base) MCG/ACT inhaler, Every 4 (Four) Hours., Disp: , Rfl:   •  ALPRAZolam XR 0.5 MG 24 hr tablet, TAKE ONE TABLET BY MOUTH EVERY MORNING AS NEEDED, Disp: , Rfl:   •  budesonide-formoterol (Symbicort) 160-4.5 MCG/ACT inhaler, Every 12 (Twelve) Hours., Disp: , Rfl:   •  calcium carbonate-vitamin d 600-400 MG-UNIT per tablet, calcium carbonate 600 mg (1,500 mg)-vitamin D3 400 unit tablet  TAKE 1 TABLET BY MOUTH EVERY DAY WITH FOOD, Disp: , Rfl:   •  cholecalciferol (VITAMIN D3) 25 MCG (1000 UT) tablet, Take 1,000 Units by mouth Daily., Disp: , Rfl:   •  cloNIDine (CATAPRES) 0.2 MG tablet, clonidine HCl 0.2 mg tablet, Disp: , Rfl:   •  dexamethasone (DECADRON) 4 MG tablet, Take 2 tablets in the morning daily on days 2, 3 & 4.  Take with food., Disp: 6 tablet, Rfl: 5  •   diclofenac sodium (VOTAREN XR) 100 MG 24 hr tablet, diclofenac  mg tablet,extended release 24 hr, Disp: , Rfl:   •  dicyclomine (BENTYL) 20 MG tablet, Take 20 mg by mouth 3 (Three) Times a Day., Disp: , Rfl:   •  DULoxetine (CYMBALTA) 60 MG capsule, Take 60 mg by mouth Daily., Disp: , Rfl:   •  fluticasone-salmeterol (Advair HFA) 115-21 MCG/ACT inhaler, Every 12 (Twelve) Hours., Disp: , Rfl:   •  gabapentin (NEURONTIN) 600 MG tablet, Take 600 mg by mouth 3 (Three) Times a Day., Disp: , Rfl:   •  gabapentin (NEURONTIN) 800 MG tablet, Take 800 mg by mouth 3 (Three) Times a Day., Disp: , Rfl:   •  HYDROcodone-acetaminophen (NORCO)  MG per tablet, Take 1 tablet by mouth Every 6 (Six) Hours As Needed for Moderate Pain ., Disp: 28 tablet, Rfl: 0  •  lidocaine-prilocaine (EMLA) 2.5-2.5 % cream, Apply  topically to the appropriate area as directed As Needed (prior to accessing port)., Disp: 30 g, Rfl: 2  •  loratadine (CLARITIN) 10 MG tablet, Take 10 mg by mouth Daily., Disp: , Rfl:   •  omeprazole (priLOSEC) 40 MG capsule, Take 1 capsule by mouth Daily., Disp: 30 capsule, Rfl: 6  •  ondansetron (ZOFRAN) 8 MG tablet, Take 1 tablet by mouth Every 8 (Eight) Hours As Needed for Nausea or Vomiting., Disp: 30 tablet, Rfl: 3  •  promethazine (PHENERGAN) 12.5 MG tablet, Take 12.5 mg by mouth Every 12 (Twelve) Hours As Needed., Disp: , Rfl:   •  sennosides-docusate (PERICOLACE) 8.6-50 MG per tablet, , Disp: , Rfl:   •  TiZANidine (ZANAFLEX) 4 MG capsule, Take 4 mg by mouth 3 (Three) Times a Day As Needed., Disp: , Rfl:   •  vitamin B-6 (PYRIDOXINE) 100 MG tablet, Take 1 tablet by mouth 2 (two) times a day., Disp: 60 tablet, Rfl: 5    KPS: 80            Attending Radiation Oncologist’s Note: PCP appt on 7/16 and Med/onc appt on 07/29/2021 for next steps.     This document was entered by: Kristofer Glover MD     Approved Electronically By:  Kristofer Glover MD, 7/30/2021, 07:57 EDT                              Confidentiality of  this medical record shall be maintained except when use or disclosure is required or permitted by law, regulation or written authorization by the patient.

## 2021-08-12 ENCOUNTER — HOSPITAL ENCOUNTER (OUTPATIENT)
Dept: RADIATION ONCOLOGY | Facility: HOSPITAL | Age: 56
Setting detail: RADIATION/ONCOLOGY SERIES
End: 2021-08-12

## 2021-08-25 ENCOUNTER — HOSPITAL ENCOUNTER (OUTPATIENT)
Dept: RADIATION ONCOLOGY | Facility: HOSPITAL | Age: 56
Setting detail: RADIATION/ONCOLOGY SERIES
End: 2021-08-25

## 2021-09-15 ENCOUNTER — TELEPHONE (OUTPATIENT)
Dept: RADIATION ONCOLOGY | Facility: HOSPITAL | Age: 56
End: 2021-09-15

## 2021-09-15 ENCOUNTER — HOSPITAL ENCOUNTER (OUTPATIENT)
Dept: RADIATION ONCOLOGY | Facility: HOSPITAL | Age: 56
Setting detail: RADIATION/ONCOLOGY SERIES
End: 2021-09-15

## 2021-09-15 NOTE — TELEPHONE ENCOUNTER
No message left. Mailbox is full. Calling as a reminder for her follow up appt with Dr Glover on 9/16/21

## 2021-09-24 ENCOUNTER — HOSPITAL ENCOUNTER (OUTPATIENT)
Dept: RADIATION ONCOLOGY | Facility: HOSPITAL | Age: 56
Setting detail: RADIATION/ONCOLOGY SERIES
End: 2021-09-24

## 2021-09-27 ENCOUNTER — TELEPHONE (OUTPATIENT)
Dept: RADIATION ONCOLOGY | Facility: HOSPITAL | Age: 56
End: 2021-09-27

## 2021-10-06 ENCOUNTER — HOSPITAL ENCOUNTER (OUTPATIENT)
Dept: RADIATION ONCOLOGY | Facility: HOSPITAL | Age: 56
Setting detail: RADIATION/ONCOLOGY SERIES
End: 2021-10-06

## 2021-11-02 ENCOUNTER — HOSPITAL ENCOUNTER (OUTPATIENT)
Dept: RADIATION ONCOLOGY | Facility: HOSPITAL | Age: 56
Setting detail: RADIATION/ONCOLOGY SERIES
End: 2021-11-02

## 2021-11-04 ENCOUNTER — TELEPHONE (OUTPATIENT)
Dept: RADIATION ONCOLOGY | Facility: HOSPITAL | Age: 56
End: 2021-11-04

## 2021-11-09 ENCOUNTER — HOSPITAL ENCOUNTER (OUTPATIENT)
Dept: RADIATION ONCOLOGY | Facility: HOSPITAL | Age: 56
Setting detail: RADIATION/ONCOLOGY SERIES
End: 2021-11-09

## 2021-11-12 ENCOUNTER — OFFICE VISIT (OUTPATIENT)
Dept: RADIATION ONCOLOGY | Facility: HOSPITAL | Age: 56
End: 2021-11-12

## 2021-11-12 VITALS
HEIGHT: 68 IN | TEMPERATURE: 97.1 F | HEART RATE: 87 BPM | BODY MASS INDEX: 34.1 KG/M2 | SYSTOLIC BLOOD PRESSURE: 164 MMHG | WEIGHT: 225 LBS | OXYGEN SATURATION: 98 % | RESPIRATION RATE: 18 BRPM | DIASTOLIC BLOOD PRESSURE: 87 MMHG

## 2021-11-12 DIAGNOSIS — C55 MALIGNANT NEOPLASM OF UTERUS, UNSPECIFIED SITE (HCC): Primary | ICD-10-CM

## 2021-11-12 DIAGNOSIS — C54.9 MALIGNANT NEOPLASM OF CORPUS UTERI, UNSPECIFIED (HCC): ICD-10-CM

## 2021-11-12 PROCEDURE — 99213 OFFICE O/P EST LOW 20 MIN: CPT | Performed by: RADIOLOGY

## 2021-11-12 PROCEDURE — G0463 HOSPITAL OUTPT CLINIC VISIT: HCPCS | Performed by: RADIOLOGY

## 2021-11-23 NOTE — PROGRESS NOTES
FOLLOW-UP NOTE    Name: Xin Nj  YOB: 1965  MRN #: [id number]  Date of Service: 11/12/2021  Primary Care Provider: Park Dubose MD    DIAGNOSIS: Carcinosarcoma of the uterus, [id number], FIGO IB  1. Malignant neoplasm of uterus, unspecified site (HCC)      REASON FOR VISIT: Uterine cancer f/u    RADIATION TREATMENT COURSE:50.4 Gy in 28 fractions, completed 07/13/2021    HISTORY OF PRESENT ILLNESS: The patient is a 55 y.o. year old female who completed XRT 4 months ago and has not kept adjuvant follow-up appts.    She last saw Dr. Neal on 06/29/2021  She completed her 3rd cycle on 4/30/2021---she was to go back for sandwich chemo resumption but did not.    She has had no interval symptoms or complaints.  She specifically denied weight loss, pelvic pain, vaginal bleeding or acute issues.  She denied vaginal discharge or new symptoms.     The following portions of the patient's history were reviewed and updated as appropriate: allergies, current medications, past family history, past medical history, past social history, past surgical history and problem list. Reviewed with the patient and remain unchanged.    PAST MEDICAL HISTORY:  she  has a past medical history of Anxiety, COPD (chronic obstructive pulmonary disease) (CMS/HCC), COPD (chronic obstructive pulmonary disease) (CMS/HCC), Depression, Encounter for antineoplastic radiation therapy, Endometrial cancer (CMS/[facility]), History of right breast cancer, and Osteoporosis.  MEDICATIONS:   Current Outpatient Medications:   •  albuterol sulfate HFA (Ventolin HFA) 108 (90 Base) MCG/ACT inhaler, Every 4 (Four) Hours., Disp: , Rfl:   •  budesonide-formoterol (Symbicort) 160-4.5 MCG/ACT inhaler, Every 12 (Twelve) Hours., Disp: , Rfl:   •  calcium carbonate-vitamin d 600-400 MG-UNIT per tablet, calcium carbonate 600 mg (1,500 mg)-vitamin D3 400 unit tablet  TAKE 1 TABLET BY MOUTH EVERY DAY WITH FOOD, Disp: , Rfl:   •  cholecalciferol (VITAMIN  "D3) 25 MCG (1000 UT) tablet, Take 1,000 Units by mouth Daily., Disp: , Rfl:   •  cloNIDine (CATAPRES) 0.2 MG tablet, clonidine HCl 0.2 mg tablet, Disp: , Rfl:   •  DULoxetine (CYMBALTA) 60 MG capsule, Take 60 mg by mouth Daily., Disp: , Rfl:   •  fluticasone-salmeterol (Advair HFA) 115-21 MCG/ACT inhaler, Every 12 (Twelve) Hours., Disp: , Rfl:   •  gabapentin (NEURONTIN) 600 MG tablet, Take 600 mg by mouth 3 (Three) Times a Day., Disp: , Rfl:   •  gabapentin (NEURONTIN) 800 MG tablet, Take 800 mg by mouth 3 (Three) Times a Day., Disp: , Rfl:   •  HYDROcodone-acetaminophen (NORCO)  MG per tablet, Take 1 tablet by mouth Every 6 (Six) Hours As Needed for Moderate Pain ., Disp: 28 tablet, Rfl: 0  •  lidocaine-prilocaine (EMLA) 2.5-2.5 % cream, Apply  topically to the appropriate area as directed As Needed (prior to accessing port)., Disp: 30 g, Rfl: 2  ALLERGIES: No Known Allergies  PAST SURGICAL HISTORY: she has a past surgical history that includes Breast lumpectomy (Right) and Hysterectomy (2020).  PREVIOUS RADIOTHERAPY OR CHEMOTHERAPY: as noted; she wants to enter surveillance and hold further systemic therapy only in case of progression.  FAMILY HISTORY: her family history includes Lung cancer in her brother; Stroke in her mother.  SOCIAL HISTORY: she  reports that she has been smoking. She has never used smokeless tobacco. She reports previous alcohol use. She reports that she does not use drugs.  PAIN AND PAIN MANAGEMENT: chronic hip mariscal.  Xin Nj reports a pain score of 8.  Given her pain assessment as noted, treatment options were discussed and the following options were decided upon as a follow-up plan to address the patient's pain: continuation of current treatment plan for pain.    Vitals:    11/12/21 1140   BP: 164/87   Pulse: 87   Resp: 18   Temp: 97.1 °F (36.2 °C)   TempSrc: Oral   SpO2: 98%   Weight: 102 kg (225 lb)   Height: 172.7 cm (68\")   PainSc:   8   PainLoc: Hip     NUTRITIONAL STATUS:  " "  no issues  KPS: 90:  Minor signs or symptoms        Review of Systems:       General: No fevers, chills, weight change, or drenching night sweats. Skin: No rashes or jaundice.  HEENT: No change in vision or hearing, no headaches.  Neck: No dysphagia or masses.  Heme/Lymph: No easy bruising or bleeding.  Respiratory System: No shortness of breath or cough.  Cardiovascular: No chest pain, palpitations, or dyspnea on exertion.  - Pacemaker. GI: No nausea, vomiting, diarrhea, melena, or hematochezia.  : No dysuria or hematuria.  Endocrine: No heat or cold intolerance. Musculoskeletal: No myalgias or arthralgias.  Neuro: No weakness, numbness, syncope, or seizures. Psych: No mood changes or depression. Ext: Denies swelling.        Objective     Vitals:  Vitals:    11/12/21 1140   BP: 164/87   Pulse: 87   Resp: 18   Temp: 97.1 °F (36.2 °C)   TempSrc: Oral   SpO2: 98%   Weight: 102 kg (225 lb)   Height: 172.7 cm (68\")   PainSc:   8   PainLoc: Hip       PHYSICAL EXAM:  GENERAL: in no apparent distress, sitting comfortably in room.    HEENT: normocephalic, atraumatic. Pupils are equal, round, reactive to light. Sclera anicteric. Conjunctiva not injected. Oropharynx without erythema, ulcerations or thrush.   NECK: Supple with no masses.  LYMPHATIC: no cervical, supraclavicular or axillary adenopathy appreciated bilaterally.   CARDIOVASCULAR: S1 & S2 detected; no murmurs, rubs or gallops.  CHEST: clear to auscultation bilaterally; no wheezes, crackles or rubs. Work of breathing normal.  ABDOMEN: bowel sounds present. Abdomen is soft, nontender, nondistended.   MUSCULOSKELETAL: no tenderness to palpation along the spine or scapulae. Normal range of motion.  EXTREMITIES: no clubbing, cyanosis, edema.  SKIN: no erythema, rashes, ulcerations noted.   NEUROLOGIC: cranial nerves II-XII grossly intact bilaterally. No focal neurologic deficits.  PSYCHIATRIC:  alert, aware, and appropriate.  GYN/RECT: No evidence of " recurrence      PERTINENT IMAGING/PATHOLOGY/LABS (Medical Decision Making):     COORDINATION OF CARE: A copy of this note is sent to the referring provider.    PATHOLOGY (Reviewed):     IMAGING (Reviewed):     LABS (Reviewed):  Hematology WBC   Date Value Ref Range Status   06/29/2021 4.80 3.40 - 10.80 10*3/mm3 Final     RBC   Date Value Ref Range Status   06/29/2021 3.44 (L) 3.77 - 5.28 10*6/mm3 Final     Hemoglobin   Date Value Ref Range Status   06/29/2021 11.7 (L) 12.0 - 15.9 g/dL Final     Hematocrit   Date Value Ref Range Status   06/29/2021 36.8 34.0 - 46.6 % Final     Platelets   Date Value Ref Range Status   06/29/2021 154 140 - 450 10*3/mm3 Final      Chemistry   Lab Results   Component Value Date    GLUCOSE 100 (H) 04/30/2021    BUN 16 04/30/2021    CREATININE 0.70 04/30/2021    EGFRIFNONA 86 04/30/2021    BCR 22.9 04/30/2021    K 3.8 04/30/2021    CO2 27.0 04/30/2021    CALCIUM 8.7 04/30/2021    ALBUMIN 3.60 04/30/2021    AST 13 04/30/2021    ALT 13 04/30/2021         Assessment/Plan     ASSESSMENT AND PLAN:    1. Malignant neoplasm of uterus, unspecified site (HCC)       -Non-adherent to f/u and further therapy.  -Discussed CTs and CA-125  Will f/u here every 4 months for exam    Will ask her to re-establish with Dr. Neal.    Ordering imaging and labs.  Imaging given her clinical findings/hip pain.      This assessment comes from my review of the imaging, pathology, physician notes and other pertinent information as mentioned.    DISPOSITION: 4 month f/u or earlier as indicated.        TIME SPENT WITH PATIENT:   I spent 25 minutes caring for Xin on this date of service. This time includes time spent by me in the following activities: preparing for the visit, reviewing tests, obtaining and/or reviewing a separately obtained history, performing a medically appropriate examination and/or evaluation, counseling and educating the patient/family/caregiver, documenting information in the medical record and  care coordination    CC: MD Park Urrutia MD John A Cox, MD  11/23/2021  4:35 PM EST

## 2021-12-16 ENCOUNTER — APPOINTMENT (OUTPATIENT)
Dept: PET IMAGING | Facility: HOSPITAL | Age: 56
End: 2021-12-16

## 2022-01-05 ENCOUNTER — TELEPHONE (OUTPATIENT)
Dept: RADIATION ONCOLOGY | Facility: HOSPITAL | Age: 57
End: 2022-01-05

## 2022-01-05 NOTE — TELEPHONE ENCOUNTER
Left message for a return call to reschedule her CT Scans and schedule a follow up appt with Dr Glover.

## 2022-01-18 ENCOUNTER — APPOINTMENT (OUTPATIENT)
Dept: PET IMAGING | Facility: HOSPITAL | Age: 57
End: 2022-01-18

## 2022-02-09 DIAGNOSIS — C54.1 MALIGNANT NEOPLASM OF ENDOMETRIUM: Primary | ICD-10-CM

## 2022-02-10 ENCOUNTER — LAB (OUTPATIENT)
Dept: LAB | Facility: HOSPITAL | Age: 57
End: 2022-02-10

## 2022-02-10 ENCOUNTER — HOSPITAL ENCOUNTER (OUTPATIENT)
Dept: PET IMAGING | Facility: HOSPITAL | Age: 57
Discharge: HOME | End: 2022-02-10
Admitting: RADIOLOGY

## 2022-02-10 DIAGNOSIS — C55 MALIGNANT NEOPLASM OF UTERUS, UNSPECIFIED SITE: ICD-10-CM

## 2022-02-10 DIAGNOSIS — C54.9 MALIGNANT NEOPLASM OF CORPUS UTERI, UNSPECIFIED: ICD-10-CM

## 2022-02-10 DIAGNOSIS — C54.1 MALIGNANT NEOPLASM OF ENDOMETRIUM: ICD-10-CM

## 2022-02-10 LAB — CANCER AG125 SERPL QL: 6.9 U/ML (ref 0–38.1)

## 2022-02-10 PROCEDURE — 86304 IMMUNOASSAY TUMOR CA 125: CPT

## 2022-02-10 PROCEDURE — 0 IOPAMIDOL PER 1 ML: Performed by: RADIOLOGY

## 2022-02-10 PROCEDURE — 74177 CT ABD & PELVIS W/CONTRAST: CPT

## 2022-02-10 PROCEDURE — 71260 CT THORAX DX C+: CPT

## 2022-02-10 RX ADMIN — IOPAMIDOL 100 ML: 755 INJECTION, SOLUTION INTRAVENOUS at 14:08

## 2022-03-09 ENCOUNTER — HOSPITAL ENCOUNTER (OUTPATIENT)
Dept: RADIATION ONCOLOGY | Facility: HOSPITAL | Age: 57
Setting detail: RADIATION/ONCOLOGY SERIES
End: 2022-03-09

## 2022-03-10 ENCOUNTER — TELEPHONE (OUTPATIENT)
Dept: RADIATION ONCOLOGY | Facility: HOSPITAL | Age: 57
End: 2022-03-10

## 2022-03-16 ENCOUNTER — HOSPITAL ENCOUNTER (OUTPATIENT)
Dept: RADIATION ONCOLOGY | Facility: HOSPITAL | Age: 57
Setting detail: RADIATION/ONCOLOGY SERIES
End: 2022-03-16

## 2022-03-21 ENCOUNTER — TELEPHONE (OUTPATIENT)
Dept: RADIATION ONCOLOGY | Facility: HOSPITAL | Age: 57
End: 2022-03-21

## 2022-06-07 ENCOUNTER — APPOINTMENT (OUTPATIENT)
Dept: GENERAL RADIOLOGY | Facility: HOSPITAL | Age: 57
End: 2022-06-07

## 2022-06-07 ENCOUNTER — HOSPITAL ENCOUNTER (INPATIENT)
Facility: HOSPITAL | Age: 57
LOS: 4 days | Discharge: HOME | End: 2022-06-13
Attending: FAMILY MEDICINE | Admitting: FAMILY MEDICINE

## 2022-06-07 PROBLEM — J18.9 PNEUMONIA: Status: ACTIVE | Noted: 2022-06-07

## 2022-06-07 LAB
ALBUMIN SERPL-MCNC: 3.7 G/DL (ref 3.5–5.2)
ALBUMIN/GLOB SERPL: 1.4 G/DL
ALP SERPL-CCNC: 83 U/L (ref 39–117)
ALT SERPL W P-5'-P-CCNC: 26 U/L (ref 1–33)
ANION GAP SERPL CALCULATED.3IONS-SCNC: 10 MMOL/L (ref 5–15)
AST SERPL-CCNC: 46 U/L (ref 1–32)
BASOPHILS # BLD AUTO: 0 10*3/MM3 (ref 0–0.2)
BASOPHILS NFR BLD AUTO: 0 % (ref 0–1.5)
BILIRUB SERPL-MCNC: 0.2 MG/DL (ref 0–1.2)
BUN SERPL-MCNC: 13 MG/DL (ref 6–20)
BUN/CREAT SERPL: 28.3 (ref 7–25)
CALCIUM SPEC-SCNC: 8.8 MG/DL (ref 8.6–10.5)
CHLORIDE SERPL-SCNC: 105 MMOL/L (ref 98–107)
CO2 SERPL-SCNC: 30 MMOL/L (ref 22–29)
CREAT SERPL-MCNC: 0.46 MG/DL (ref 0.57–1)
D-LACTATE SERPL-SCNC: 1.2 MMOL/L (ref 0.5–2)
DEPRECATED RDW RBC AUTO: 46.8 FL (ref 37–54)
EGFRCR SERPLBLD CKD-EPI 2021: 111.1 ML/MIN/1.73
EOSINOPHIL # BLD AUTO: 0 10*3/MM3 (ref 0–0.4)
EOSINOPHIL NFR BLD AUTO: 0 % (ref 0.3–6.2)
ERYTHROCYTE [DISTWIDTH] IN BLOOD BY AUTOMATED COUNT: 13.9 % (ref 12.3–15.4)
GLOBULIN UR ELPH-MCNC: 2.7 GM/DL
GLUCOSE SERPL-MCNC: 140 MG/DL (ref 65–99)
HCT VFR BLD AUTO: 41.4 % (ref 34–46.6)
HGB BLD-MCNC: 13.5 G/DL (ref 12–15.9)
LYMPHOCYTES # BLD AUTO: 0.3 10*3/MM3 (ref 0.7–3.1)
LYMPHOCYTES NFR BLD AUTO: 3 % (ref 19.6–45.3)
MCH RBC QN AUTO: 30.8 PG (ref 26.6–33)
MCHC RBC AUTO-ENTMCNC: 32.7 G/DL (ref 31.5–35.7)
MCV RBC AUTO: 94.3 FL (ref 79–97)
MONOCYTES # BLD AUTO: 0.2 10*3/MM3 (ref 0.1–0.9)
MONOCYTES NFR BLD AUTO: 2.3 % (ref 5–12)
NEUTROPHILS NFR BLD AUTO: 9 10*3/MM3 (ref 1.7–7)
NEUTROPHILS NFR BLD AUTO: 94.7 % (ref 42.7–76)
NRBC BLD AUTO-RTO: 0 /100 WBC (ref 0–0.2)
PLATELET # BLD AUTO: 195 10*3/MM3 (ref 140–450)
PMV BLD AUTO: 7.3 FL (ref 6–12)
POTASSIUM SERPL-SCNC: 3.7 MMOL/L (ref 3.5–5.2)
PROT SERPL-MCNC: 6.4 G/DL (ref 6–8.5)
RBC # BLD AUTO: 4.38 10*6/MM3 (ref 3.77–5.28)
SODIUM SERPL-SCNC: 145 MMOL/L (ref 136–145)
TROPONIN T SERPL-MCNC: <0.01 NG/ML (ref 0–0.03)
WBC NRBC COR # BLD: 9.5 10*3/MM3 (ref 3.4–10.8)

## 2022-06-07 PROCEDURE — 25010000002 METHYLPREDNISOLONE PER 125 MG: Performed by: FAMILY MEDICINE

## 2022-06-07 PROCEDURE — 80053 COMPREHEN METABOLIC PANEL: CPT | Performed by: FAMILY MEDICINE

## 2022-06-07 PROCEDURE — 94799 UNLISTED PULMONARY SVC/PX: CPT

## 2022-06-07 PROCEDURE — 94640 AIRWAY INHALATION TREATMENT: CPT

## 2022-06-07 PROCEDURE — 83605 ASSAY OF LACTIC ACID: CPT | Performed by: FAMILY MEDICINE

## 2022-06-07 PROCEDURE — 84484 ASSAY OF TROPONIN QUANT: CPT | Performed by: INTERNAL MEDICINE

## 2022-06-07 PROCEDURE — 85025 COMPLETE CBC W/AUTO DIFF WBC: CPT | Performed by: FAMILY MEDICINE

## 2022-06-07 PROCEDURE — 87040 BLOOD CULTURE FOR BACTERIA: CPT | Performed by: FAMILY MEDICINE

## 2022-06-07 PROCEDURE — 71046 X-RAY EXAM CHEST 2 VIEWS: CPT

## 2022-06-07 PROCEDURE — 25010000002 HYDRALAZINE PER 20 MG: Performed by: FAMILY MEDICINE

## 2022-06-07 RX ORDER — ENOXAPARIN SODIUM 100 MG/ML
40 INJECTION SUBCUTANEOUS DAILY
Status: DISCONTINUED | OUTPATIENT
Start: 2022-06-08 | End: 2022-06-13 | Stop reason: HOSPADM

## 2022-06-07 RX ORDER — NITROGLYCERIN 0.4 MG/1
0.4 TABLET SUBLINGUAL
Status: DISCONTINUED | OUTPATIENT
Start: 2022-06-07 | End: 2022-06-13 | Stop reason: HOSPADM

## 2022-06-07 RX ORDER — ALPRAZOLAM 0.5 MG/1
0.5 TABLET ORAL EVERY 6 HOURS PRN
Status: DISCONTINUED | OUTPATIENT
Start: 2022-06-07 | End: 2022-06-13 | Stop reason: HOSPADM

## 2022-06-07 RX ORDER — ALUMINA, MAGNESIA, AND SIMETHICONE 2400; 2400; 240 MG/30ML; MG/30ML; MG/30ML
15 SUSPENSION ORAL EVERY 6 HOURS PRN
Status: DISCONTINUED | OUTPATIENT
Start: 2022-06-07 | End: 2022-06-13 | Stop reason: HOSPADM

## 2022-06-07 RX ORDER — METHYLPREDNISOLONE SODIUM SUCCINATE 125 MG/2ML
60 INJECTION, POWDER, LYOPHILIZED, FOR SOLUTION INTRAMUSCULAR; INTRAVENOUS EVERY 8 HOURS
Status: DISCONTINUED | OUTPATIENT
Start: 2022-06-07 | End: 2022-06-10

## 2022-06-07 RX ORDER — ALBUTEROL SULFATE 2.5 MG/3ML
2.5 SOLUTION RESPIRATORY (INHALATION) EVERY 6 HOURS PRN
Status: DISCONTINUED | OUTPATIENT
Start: 2022-06-07 | End: 2022-06-07

## 2022-06-07 RX ORDER — MELATONIN
1000 DAILY
Status: DISCONTINUED | OUTPATIENT
Start: 2022-06-07 | End: 2022-06-13 | Stop reason: HOSPADM

## 2022-06-07 RX ORDER — SODIUM CHLORIDE 0.9 % (FLUSH) 0.9 %
3-10 SYRINGE (ML) INJECTION AS NEEDED
Status: DISCONTINUED | OUTPATIENT
Start: 2022-06-07 | End: 2022-06-13 | Stop reason: HOSPADM

## 2022-06-07 RX ORDER — IPRATROPIUM BROMIDE AND ALBUTEROL SULFATE 2.5; .5 MG/3ML; MG/3ML
3 SOLUTION RESPIRATORY (INHALATION) EVERY 6 HOURS PRN
COMMUNITY

## 2022-06-07 RX ORDER — METOPROLOL TARTRATE 5 MG/5ML
INJECTION INTRAVENOUS
Status: DISPENSED
Start: 2022-06-07 | End: 2022-06-08

## 2022-06-07 RX ORDER — SODIUM CHLORIDE 0.9 % (FLUSH) 0.9 %
3 SYRINGE (ML) INJECTION EVERY 12 HOURS SCHEDULED
Status: DISCONTINUED | OUTPATIENT
Start: 2022-06-07 | End: 2022-06-13 | Stop reason: HOSPADM

## 2022-06-07 RX ORDER — BUDESONIDE, GLYCOPYRROLATE, AND FORMOTEROL FUMARATE 160; 9; 4.8 UG/1; UG/1; UG/1
2 AEROSOL, METERED RESPIRATORY (INHALATION) 2 TIMES DAILY
Status: ON HOLD | COMMUNITY
End: 2022-06-07

## 2022-06-07 RX ORDER — ONDANSETRON 2 MG/ML
4 INJECTION INTRAMUSCULAR; INTRAVENOUS EVERY 6 HOURS PRN
Status: DISCONTINUED | OUTPATIENT
Start: 2022-06-07 | End: 2022-06-13 | Stop reason: HOSPADM

## 2022-06-07 RX ORDER — METOPROLOL TARTRATE 5 MG/5ML
5 INJECTION INTRAVENOUS EVERY 4 HOURS PRN
Status: DISCONTINUED | OUTPATIENT
Start: 2022-06-07 | End: 2022-06-13 | Stop reason: HOSPADM

## 2022-06-07 RX ORDER — HYDROCODONE BITARTRATE AND ACETAMINOPHEN 10; 325 MG/1; MG/1
1 TABLET ORAL EVERY 6 HOURS PRN
Status: DISCONTINUED | OUTPATIENT
Start: 2022-06-07 | End: 2022-06-13 | Stop reason: HOSPADM

## 2022-06-07 RX ORDER — GABAPENTIN 600 MG/1
600 TABLET ORAL 3 TIMES DAILY
Status: DISCONTINUED | OUTPATIENT
Start: 2022-06-08 | End: 2022-06-13 | Stop reason: HOSPADM

## 2022-06-07 RX ORDER — FAMOTIDINE 20 MG/1
40 TABLET, FILM COATED ORAL DAILY
Status: DISCONTINUED | OUTPATIENT
Start: 2022-06-07 | End: 2022-06-10

## 2022-06-07 RX ORDER — IPRATROPIUM BROMIDE AND ALBUTEROL SULFATE 2.5; .5 MG/3ML; MG/3ML
3 SOLUTION RESPIRATORY (INHALATION) EVERY 6 HOURS PRN
Status: DISCONTINUED | OUTPATIENT
Start: 2022-06-07 | End: 2022-06-13 | Stop reason: HOSPADM

## 2022-06-07 RX ORDER — HYDRALAZINE HYDROCHLORIDE 20 MG/ML
10 INJECTION INTRAMUSCULAR; INTRAVENOUS EVERY 6 HOURS PRN
Status: DISCONTINUED | OUTPATIENT
Start: 2022-06-07 | End: 2022-06-13 | Stop reason: HOSPADM

## 2022-06-07 RX ORDER — BUDESONIDE 0.5 MG/2ML
0.5 INHALANT ORAL
Status: DISCONTINUED | OUTPATIENT
Start: 2022-06-07 | End: 2022-06-13 | Stop reason: HOSPADM

## 2022-06-07 RX ORDER — IPRATROPIUM BROMIDE AND ALBUTEROL SULFATE 2.5; .5 MG/3ML; MG/3ML
3 SOLUTION RESPIRATORY (INHALATION)
Status: DISCONTINUED | OUTPATIENT
Start: 2022-06-07 | End: 2022-06-08

## 2022-06-07 RX ADMIN — CALCIUM CARBONATE-VITAMIN D TAB 500 MG-200 UNIT 1 TABLET: 500-200 TAB at 19:27

## 2022-06-07 RX ADMIN — Medication 1000 UNITS: at 19:27

## 2022-06-07 RX ADMIN — ALPRAZOLAM 0.5 MG: 0.5 TABLET ORAL at 22:48

## 2022-06-07 RX ADMIN — BUDESONIDE 0.5 MG: 0.5 INHALANT RESPIRATORY (INHALATION) at 21:08

## 2022-06-07 RX ADMIN — METOPROLOL TARTRATE 5 MG: 1 INJECTION, SOLUTION INTRAVENOUS at 22:29

## 2022-06-07 RX ADMIN — FAMOTIDINE 40 MG: 20 TABLET ORAL at 19:27

## 2022-06-07 RX ADMIN — IPRATROPIUM BROMIDE AND ALBUTEROL SULFATE 3 ML: .5; 3 SOLUTION RESPIRATORY (INHALATION) at 21:08

## 2022-06-07 RX ADMIN — METHYLPREDNISOLONE SODIUM SUCCINATE 60 MG: 125 INJECTION, POWDER, FOR SOLUTION INTRAMUSCULAR; INTRAVENOUS at 19:26

## 2022-06-07 RX ADMIN — Medication 3 ML: at 19:27

## 2022-06-07 RX ADMIN — HYDROCODONE BITARTRATE AND ACETAMINOPHEN 1 TABLET: 10; 325 TABLET ORAL at 18:36

## 2022-06-07 RX ADMIN — HYDRALAZINE HYDROCHLORIDE 10 MG: 20 INJECTION INTRAMUSCULAR; INTRAVENOUS at 20:06

## 2022-06-07 NOTE — CONSULTS
[facility]: [facility]         CONSULT NOTE    Patient Identification:  Xin Nj  56 y.o.  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        Past Medical History:  Past Medical History:   Diagnosis Date   • Anxiety    • COPD (chronic obstructive pulmonary disease) (CMS/HCC)    • COPD (chronic obstructive pulmonary disease) (CMS/HCC)    • Depression    • Encounter for antineoplastic radiation therapy    • Endometrial cancer (CMS/HCC)    • History of right breast cancer    • Osteoporosis        Past Surgical History:  Past Surgical History:   Procedure Laterality Date   • BREAST LUMPECTOMY Right    • HYSTERECTOMY  2020        Home Meds:  Medications Prior to Admission   Medication Sig Dispense Refill Last Dose   • albuterol sulfate  (90 Base) MCG/ACT inhaler Inhale 2 puffs Every 4 (Four) Hours As Needed.      • budesonide-formoterol (SYMBICORT) 160-4.5 MCG/ACT inhaler Inhale 2 puffs 2 (Two) Times a Day.      • calcium carbonate-vitamin d 600-400 MG-UNIT per tablet Take 1 tablet by  "mouth Daily.      • cholecalciferol (VITAMIN D3) 25 MCG (1000 UT) tablet Take 1,000 Units by mouth Daily.      • gabapentin (NEURONTIN) 600 MG tablet Take 600 mg by mouth 3 (Three) Times a Day.      • HYDROcodone-acetaminophen (NORCO)  MG per tablet Take 1 tablet by mouth Every 6 (Six) Hours As Needed for Moderate Pain . 28 tablet 0    • ipratropium-albuterol (DUO-NEB) 0.5-2.5 mg/3 ml nebulizer Take 3 mL by nebulization Every 6 (Six) Hours As Needed for Wheezing.          Allergies:  No Known Allergies    Social History:   Social History     Socioeconomic History   • Marital status:    Tobacco Use   • Smoking status: Current Every Day Smoker   • Smokeless tobacco: Never Used   • Tobacco comment: down to 1 ppd   Vaping Use   • Vaping Use: Never used   Substance and Sexual Activity   • Alcohol use: Not Currently   • Drug use: Never   • Sexual activity: Defer       Family History:  Family History   Problem Relation Age of Onset   • Stroke Mother    • Lung cancer Brother        Physical Exam:  BP (!) 169/101 (BP Location: Right arm, Patient Position: Lying)   Pulse 93   Temp 97 °F (36.1 °C) (Oral)   Resp 20   Ht 172.7 cm (68\")   Wt 93 kg (205 lb)   SpO2 95%   BMI 31.17 kg/m²  Body mass index is 31.17 kg/m². 95% 93 kg (205 lb)  Physical Exam  Cardiovascular:      Heart sounds: Murmur heard.   Pulmonary:      Breath sounds: Rhonchi and rales present.         LABS:  Lab Results   Component Value Date    CALCIUM 8.7 04/30/2021       No results found for: CKTOTAL, CKMB, CKMBINDEX, TROPONINI, TROPONINT                              No results found for: TSH  CrCl cannot be calculated (Patient's most recent lab result is older than the maximum 30 days allowed.).         Imaging:  Imaging Results (Last 24 Hours)     Procedure Component Value Units Date/Time    XR Chest PA & Lateral [[id number]] Resulted: 06/07/22 1900     Updated: 06/07/22 1901            Current Meds:   SCHEDULE  budesonide, 0.5 mg, " Nebulization, BID - RT  calcium 500 mg vitamin D 5 mcg (200 UT), 1 tablet, Oral, Daily  cholecalciferol, 1,000 Units, Oral, Daily  enoxaparin, 40 mg, Subcutaneous, Q24H  famotidine, 40 mg, Oral, Daily  gabapentin, 600 mg, Oral, TID  ipratropium-albuterol, 3 mL, Nebulization, 4x Daily - RT  methylPREDNISolone sodium succinate, 60 mg, Intravenous, Q8H  piperacillin-tazobactam, 3.375 g, Intravenous, Once  [START ON 6/8/2022] piperacillin-tazobactam, 3.375 g, Intravenous, Q8H  sodium chloride, 3 mL, Intravenous, Q12H      Infusions     PRNs  •  aluminum-magnesium hydroxide-simethicone  •  HYDROcodone-acetaminophen  •  ipratropium-albuterol  •  nitroglycerin  •  ondansetron  •  sodium chloride        Frankie Younger MD  6/7/2022  19:21 EDT      Much of this encounter note is an electronic transcription/translation of spoken language to printed text using Dragon Software.

## 2022-06-08 ENCOUNTER — APPOINTMENT (OUTPATIENT)
Dept: CARDIOLOGY | Facility: HOSPITAL | Age: 57
End: 2022-06-08

## 2022-06-08 PROBLEM — J44.9 COPD (CHRONIC OBSTRUCTIVE PULMONARY DISEASE) (HCC): Status: ACTIVE | Noted: 2022-06-08

## 2022-06-08 LAB
ALBUMIN SERPL-MCNC: 3.1 G/DL (ref 3.5–5.2)
ALBUMIN/GLOB SERPL: 1.1 G/DL
ALP SERPL-CCNC: 74 U/L (ref 39–117)
ALT SERPL W P-5'-P-CCNC: 26 U/L (ref 1–33)
ANION GAP SERPL CALCULATED.3IONS-SCNC: 9 MMOL/L (ref 5–15)
AST SERPL-CCNC: 42 U/L (ref 1–32)
BASOPHILS # BLD AUTO: 0 10*3/MM3 (ref 0–0.2)
BASOPHILS NFR BLD AUTO: 0.4 % (ref 0–1.5)
BH CV ECHO MEAS - ACS: 1.57 CM
BH CV ECHO MEAS - AO MAX PG: 6.2 MMHG
BH CV ECHO MEAS - AO MEAN PG: 3.2 MMHG
BH CV ECHO MEAS - AO ROOT DIAM: 2.9 CM
BH CV ECHO MEAS - AO V2 MAX: 124.2 CM/SEC
BH CV ECHO MEAS - AO V2 VTI: 25.8 CM
BH CV ECHO MEAS - AVA(I,D): 2.35 CM2
BH CV ECHO MEAS - EDV(CUBED): 84.4 ML
BH CV ECHO MEAS - EDV(MOD-SP4): 87.4 ML
BH CV ECHO MEAS - EF(MOD-BP): 55 %
BH CV ECHO MEAS - EF(MOD-SP4): 54.6 %
BH CV ECHO MEAS - ESV(CUBED): 41.2 ML
BH CV ECHO MEAS - ESV(MOD-SP4): 39.7 ML
BH CV ECHO MEAS - FS: 21.3 %
BH CV ECHO MEAS - IVS/LVPW: 0.86 CM
BH CV ECHO MEAS - IVSD: 0.86 CM
BH CV ECHO MEAS - LA A2CS (ATRIAL LENGTH): 3.3 CM
BH CV ECHO MEAS - LV DIASTOLIC VOL/BSA (35-75): 41.5 CM2
BH CV ECHO MEAS - LV MASS(C)D: 132.6 GRAMS
BH CV ECHO MEAS - LV MAX PG: 2.38 MMHG
BH CV ECHO MEAS - LV MEAN PG: 1.35 MMHG
BH CV ECHO MEAS - LV SYSTOLIC VOL/BSA (12-30): 18.8 CM2
BH CV ECHO MEAS - LV V1 MAX: 77.2 CM/SEC
BH CV ECHO MEAS - LV V1 VTI: 17.3 CM
BH CV ECHO MEAS - LVIDD: 4.4 CM
BH CV ECHO MEAS - LVIDS: 3.5 CM
BH CV ECHO MEAS - LVOT AREA: 3.5 CM2
BH CV ECHO MEAS - LVOT DIAM: 2.11 CM
BH CV ECHO MEAS - LVPWD: 0.99 CM
BH CV ECHO MEAS - MV A MAX VEL: 59.9 CM/SEC
BH CV ECHO MEAS - MV DEC SLOPE: 431.8 CM/SEC2
BH CV ECHO MEAS - MV DEC TIME: 0.15 MSEC
BH CV ECHO MEAS - MV E MAX VEL: 66.4 CM/SEC
BH CV ECHO MEAS - MV E/A: 1.11
BH CV ECHO MEAS - MV MAX PG: 3.7 MMHG
BH CV ECHO MEAS - MV MEAN PG: 2.01 MMHG
BH CV ECHO MEAS - MV V2 VTI: 17.9 CM
BH CV ECHO MEAS - MVA(VTI): 3.4 CM2
BH CV ECHO MEAS - PA V2 MAX: 116.1 CM/SEC
BH CV ECHO MEAS - RV MAX PG: 3.8 MMHG
BH CV ECHO MEAS - RV V1 MAX: 97.6 CM/SEC
BH CV ECHO MEAS - RV V1 VTI: 22.1 CM
BH CV ECHO MEAS - RVDD: 3.1 CM
BH CV ECHO MEAS - SI(MOD-SP4): 22.6 ML/M2
BH CV ECHO MEAS - SV(LVOT): 60.7 ML
BH CV ECHO MEAS - SV(MOD-SP4): 47.7 ML
BILIRUB SERPL-MCNC: 0.2 MG/DL (ref 0–1.2)
BUN SERPL-MCNC: 15 MG/DL (ref 6–20)
BUN/CREAT SERPL: 35.7 (ref 7–25)
CALCIUM SPEC-SCNC: 8.5 MG/DL (ref 8.6–10.5)
CHLORIDE SERPL-SCNC: 104 MMOL/L (ref 98–107)
CO2 SERPL-SCNC: 30 MMOL/L (ref 22–29)
CREAT SERPL-MCNC: 0.42 MG/DL (ref 0.57–1)
DEPRECATED RDW RBC AUTO: 45.9 FL (ref 37–54)
EGFRCR SERPLBLD CKD-EPI 2021: 113.5 ML/MIN/1.73
EOSINOPHIL # BLD AUTO: 0 10*3/MM3 (ref 0–0.4)
EOSINOPHIL NFR BLD AUTO: 0.1 % (ref 0.3–6.2)
ERYTHROCYTE [DISTWIDTH] IN BLOOD BY AUTOMATED COUNT: 13.5 % (ref 12.3–15.4)
GLOBULIN UR ELPH-MCNC: 2.7 GM/DL
GLUCOSE SERPL-MCNC: 120 MG/DL (ref 65–99)
HCT VFR BLD AUTO: 38.9 % (ref 34–46.6)
HGB BLD-MCNC: 12.8 G/DL (ref 12–15.9)
LYMPHOCYTES # BLD AUTO: 0.4 10*3/MM3 (ref 0.7–3.1)
LYMPHOCYTES NFR BLD AUTO: 5.4 % (ref 19.6–45.3)
MAXIMAL PREDICTED HEART RATE: 162 BPM
MCH RBC QN AUTO: 31 PG (ref 26.6–33)
MCHC RBC AUTO-ENTMCNC: 32.8 G/DL (ref 31.5–35.7)
MCV RBC AUTO: 94.7 FL (ref 79–97)
MONOCYTES # BLD AUTO: 0.3 10*3/MM3 (ref 0.1–0.9)
MONOCYTES NFR BLD AUTO: 4.2 % (ref 5–12)
NEUTROPHILS NFR BLD AUTO: 7.1 10*3/MM3 (ref 1.7–7)
NEUTROPHILS NFR BLD AUTO: 89.9 % (ref 42.7–76)
NRBC BLD AUTO-RTO: 0.1 /100 WBC (ref 0–0.2)
NT-PROBNP SERPL-MCNC: 1157 PG/ML (ref 0–900)
PLATELET # BLD AUTO: 164 10*3/MM3 (ref 140–450)
PMV BLD AUTO: 7.5 FL (ref 6–12)
POTASSIUM SERPL-SCNC: 3.9 MMOL/L (ref 3.5–5.2)
PROT SERPL-MCNC: 5.8 G/DL (ref 6–8.5)
RBC # BLD AUTO: 4.11 10*6/MM3 (ref 3.77–5.28)
SODIUM SERPL-SCNC: 143 MMOL/L (ref 136–145)
STRESS TARGET HR: 138 BPM
WBC NRBC COR # BLD: 7.9 10*3/MM3 (ref 3.4–10.8)

## 2022-06-08 PROCEDURE — 80053 COMPREHEN METABOLIC PANEL: CPT | Performed by: FAMILY MEDICINE

## 2022-06-08 PROCEDURE — 94799 UNLISTED PULMONARY SVC/PX: CPT

## 2022-06-08 PROCEDURE — 97161 PT EVAL LOW COMPLEX 20 MIN: CPT

## 2022-06-08 PROCEDURE — 25010000002 PIPERACILLIN SOD-TAZOBACTAM PER 1 G: Performed by: FAMILY MEDICINE

## 2022-06-08 PROCEDURE — 93306 TTE W/DOPPLER COMPLETE: CPT | Performed by: INTERNAL MEDICINE

## 2022-06-08 PROCEDURE — 93306 TTE W/DOPPLER COMPLETE: CPT

## 2022-06-08 PROCEDURE — 83880 ASSAY OF NATRIURETIC PEPTIDE: CPT | Performed by: FAMILY MEDICINE

## 2022-06-08 PROCEDURE — 25010000002 HYDRALAZINE PER 20 MG: Performed by: FAMILY MEDICINE

## 2022-06-08 PROCEDURE — 87040 BLOOD CULTURE FOR BACTERIA: CPT | Performed by: FAMILY MEDICINE

## 2022-06-08 PROCEDURE — 25010000002 METHYLPREDNISOLONE PER 125 MG: Performed by: FAMILY MEDICINE

## 2022-06-08 PROCEDURE — 97166 OT EVAL MOD COMPLEX 45 MIN: CPT

## 2022-06-08 PROCEDURE — 25010000002 ENOXAPARIN PER 10 MG: Performed by: FAMILY MEDICINE

## 2022-06-08 PROCEDURE — 94760 N-INVAS EAR/PLS OXIMETRY 1: CPT

## 2022-06-08 PROCEDURE — 94664 DEMO&/EVAL PT USE INHALER: CPT

## 2022-06-08 PROCEDURE — G0378 HOSPITAL OBSERVATION PER HR: HCPCS

## 2022-06-08 PROCEDURE — 85025 COMPLETE CBC W/AUTO DIFF WBC: CPT | Performed by: FAMILY MEDICINE

## 2022-06-08 RX ORDER — IPRATROPIUM BROMIDE AND ALBUTEROL SULFATE 2.5; .5 MG/3ML; MG/3ML
3 SOLUTION RESPIRATORY (INHALATION)
Status: DISCONTINUED | OUTPATIENT
Start: 2022-06-08 | End: 2022-06-13 | Stop reason: HOSPADM

## 2022-06-08 RX ORDER — DOCUSATE SODIUM 100 MG/1
100 CAPSULE, LIQUID FILLED ORAL 2 TIMES DAILY
Status: DISCONTINUED | OUTPATIENT
Start: 2022-06-08 | End: 2022-06-13 | Stop reason: HOSPADM

## 2022-06-08 RX ORDER — NICOTINE 21 MG/24HR
1 PATCH, TRANSDERMAL 24 HOURS TRANSDERMAL
Status: DISCONTINUED | OUTPATIENT
Start: 2022-06-08 | End: 2022-06-09

## 2022-06-08 RX ADMIN — NICOTINE 1 PATCH: 14 PATCH, EXTENDED RELEASE TRANSDERMAL at 08:57

## 2022-06-08 RX ADMIN — METHYLPREDNISOLONE SODIUM SUCCINATE 60 MG: 125 INJECTION, POWDER, FOR SOLUTION INTRAMUSCULAR; INTRAVENOUS at 11:33

## 2022-06-08 RX ADMIN — HYDROCODONE BITARTRATE AND ACETAMINOPHEN 1 TABLET: 10; 325 TABLET ORAL at 17:35

## 2022-06-08 RX ADMIN — PIPERACILLIN AND TAZOBACTAM 3.38 G: 3; .375 INJECTION, POWDER, LYOPHILIZED, FOR SOLUTION INTRAVENOUS at 03:49

## 2022-06-08 RX ADMIN — IPRATROPIUM BROMIDE AND ALBUTEROL SULFATE 3 ML: .5; 3 SOLUTION RESPIRATORY (INHALATION) at 08:11

## 2022-06-08 RX ADMIN — Medication 3 ML: at 20:21

## 2022-06-08 RX ADMIN — METHYLPREDNISOLONE SODIUM SUCCINATE 60 MG: 125 INJECTION, POWDER, FOR SOLUTION INTRAMUSCULAR; INTRAVENOUS at 03:50

## 2022-06-08 RX ADMIN — IPRATROPIUM BROMIDE AND ALBUTEROL SULFATE 3 ML: .5; 3 SOLUTION RESPIRATORY (INHALATION) at 20:32

## 2022-06-08 RX ADMIN — METHYLPREDNISOLONE SODIUM SUCCINATE 60 MG: 125 INJECTION, POWDER, FOR SOLUTION INTRAMUSCULAR; INTRAVENOUS at 19:31

## 2022-06-08 RX ADMIN — GABAPENTIN 600 MG: 600 TABLET, FILM COATED ORAL at 20:21

## 2022-06-08 RX ADMIN — HYDROCODONE BITARTRATE AND ACETAMINOPHEN 1 TABLET: 10; 325 TABLET ORAL at 23:53

## 2022-06-08 RX ADMIN — BUDESONIDE 0.5 MG: 0.5 INHALANT RESPIRATORY (INHALATION) at 08:15

## 2022-06-08 RX ADMIN — HYDROCODONE BITARTRATE AND ACETAMINOPHEN 1 TABLET: 10; 325 TABLET ORAL at 11:34

## 2022-06-08 RX ADMIN — CALCIUM CARBONATE-VITAMIN D TAB 500 MG-200 UNIT 1 TABLET: 500-200 TAB at 08:56

## 2022-06-08 RX ADMIN — HYDROCODONE BITARTRATE AND ACETAMINOPHEN 1 TABLET: 10; 325 TABLET ORAL at 04:53

## 2022-06-08 RX ADMIN — ALPRAZOLAM 0.25 MG: 0.5 TABLET ORAL at 04:53

## 2022-06-08 RX ADMIN — GABAPENTIN 600 MG: 600 TABLET, FILM COATED ORAL at 01:45

## 2022-06-08 RX ADMIN — METOPROLOL TARTRATE 5 MG: 1 INJECTION, SOLUTION INTRAVENOUS at 12:11

## 2022-06-08 RX ADMIN — ALPRAZOLAM 0.25 MG: 0.5 TABLET ORAL at 17:42

## 2022-06-08 RX ADMIN — BUDESONIDE 0.5 MG: 0.5 INHALANT RESPIRATORY (INHALATION) at 20:32

## 2022-06-08 RX ADMIN — Medication 1000 UNITS: at 08:57

## 2022-06-08 RX ADMIN — DOCUSATE SODIUM 100 MG: 100 CAPSULE, LIQUID FILLED ORAL at 20:21

## 2022-06-08 RX ADMIN — GABAPENTIN 600 MG: 600 TABLET, FILM COATED ORAL at 17:35

## 2022-06-08 RX ADMIN — PIPERACILLIN AND TAZOBACTAM 3.38 G: 3; .375 INJECTION, POWDER, LYOPHILIZED, FOR SOLUTION INTRAVENOUS at 11:34

## 2022-06-08 RX ADMIN — ALPRAZOLAM 0.5 MG: 0.5 TABLET ORAL at 11:34

## 2022-06-08 RX ADMIN — HYDRALAZINE HYDROCHLORIDE 10 MG: 20 INJECTION INTRAMUSCULAR; INTRAVENOUS at 04:12

## 2022-06-08 RX ADMIN — PIPERACILLIN AND TAZOBACTAM 3.38 G: 3; .375 INJECTION, POWDER, LYOPHILIZED, FOR SOLUTION INTRAVENOUS at 17:35

## 2022-06-08 RX ADMIN — ENOXAPARIN SODIUM 40 MG: 100 INJECTION SUBCUTANEOUS at 17:35

## 2022-06-08 RX ADMIN — Medication 3 ML: at 08:57

## 2022-06-08 RX ADMIN — FAMOTIDINE 40 MG: 20 TABLET ORAL at 08:57

## 2022-06-08 RX ADMIN — ALPRAZOLAM 0.5 MG: 0.5 TABLET ORAL at 23:53

## 2022-06-08 NOTE — PLAN OF CARE
Goal Outcome Evaluation:  Plan of Care Reviewed With: patient           Outcome Evaluation: Pt admitted for COPD exacerbation, currently on 4L of O2 at this time sats @ 94%. IV abx and IV steroids given. Pt very anxious this morning gave PRN Xanax half dose per pt request. Pulmonology currently following, will continue to monitor.

## 2022-06-08 NOTE — PLAN OF CARE
Goal Outcome Evaluation:                   Patient is still very short of breath today. Patient is on IV steroids and antibiotics. Patient is from home, but requires IP rehab on discharge, waiting on choices.

## 2022-06-08 NOTE — PROGRESS NOTES
"PULMONARY CRITICAL CARE Progress  NOTE      PATIENT IDENTIFICATION:  Name: Xin Nj  MRN: [id number]  :  1965     Age: 56 y.o.  Sex: female    DATE OF Note:  2022   Referring Physician: Park Dubose MD                  Subjective:   Feeling a little better, on 4 L O2,  no SOB, no chest or abd pain, no bowel or bladder issues     Objective:  tMax 24 hrs: Temp (24hrs), Av.7 °F (36.5 °C), Min:97.5 °F (36.4 °C), Max:97.8 °F (36.6 °C)      Vitals Ranges:   Temp:  [97.5 °F (36.4 °C)-97.8 °F (36.6 °C)] 97.8 °F (36.6 °C)  Heart Rate:  [78-93] 93  Resp:  [18-22] 20  BP: (104-187)/() 104/64    Intake and Output Last 3 Shifts:   I/O last 3 completed shifts:  In: 960 [P.O.:960]  Out: 0     Exam:  /64 (BP Location: Right arm, Patient Position: Lying)   Pulse 93   Temp 97.8 °F (36.6 °C)   Resp 20   Ht 172.7 cm (68\")   Wt 97.5 kg (215 lb)   SpO2 92%   BMI 32.69 kg/m²     General Appearance:  Alert   HEENT:  Normocephalic, without obvious abnormality, Conjunctivae/corneas clear.  Normal external ear canals, nares normal, no drainage     Neck:  Supple, symmetrical, trachea midline. No JVD.  Lungs /Chest wall:   Bilateral basal rhonchi, respirations unlabored, symmetrical wall movement.     Heart:  Regular rate and rhythm, systolic murmur PMI left sternal border  Abdomen: Soft, nontender, no masses, no organomegaly.    Extremities: Trace edema, no clubbing or cyanosis        Medications:    Current Facility-Administered Medications:   •  ALPRAZolam (XANAX) tablet 0.5 mg, 0.5 mg, Oral, Q6H PRN, Park Dubose MD, 0.25 mg at 22 8232  •  aluminum-magnesium hydroxide-simethicone (MAALOX MAX) 400-400-40 MG/5ML suspension 15 mL, 15 mL, Oral, Q6H PRN, Park Dubose MD  •  budesonide (PULMICORT) nebulizer solution 0.5 mg, 0.5 mg, Nebulization, BID - RT, Park Dubose MD, 0.5 mg at 22 0815  •  calcium 500 mg vitamin D 5 mcg (200 UT) per tablet 1 " tablet, 1 tablet, Oral, Daily, Park Dubose MD, 1 tablet at 06/08/22 0856  •  cholecalciferol (VITAMIN D3) tablet 1,000 Units, 1,000 Units, Oral, Daily, Park Dubose MD, 1,000 Units at 06/08/22 0857  •  docusate sodium (COLACE) capsule 100 mg, 100 mg, Oral, BID, Park Dubose MD  •  Enoxaparin Sodium (LOVENOX) syringe 40 mg, 40 mg, Subcutaneous, Daily, Park Dubose MD, 40 mg at 06/08/22 1735  •  famotidine (PEPCID) tablet 40 mg, 40 mg, Oral, Daily, Park Dubose MD, 40 mg at 06/08/22 0857  •  gabapentin (NEURONTIN) tablet 600 mg, 600 mg, Oral, TID, Park Dubose MD, 600 mg at 06/08/22 1735  •  hydrALAZINE (APRESOLINE) injection 10 mg, 10 mg, Intravenous, Q6H PRN, Park Dubose MD, 10 mg at 06/08/22 0412  •  HYDROcodone-acetaminophen (NORCO)  MG per tablet 1 tablet, 1 tablet, Oral, Q6H PRN, Park Dubose MD, 1 tablet at 06/08/22 1735  •  ipratropium-albuterol (DUO-NEB) nebulizer solution 3 mL, 3 mL, Nebulization, Q6H PRN, Park Dubose MD  •  ipratropium-albuterol (DUO-NEB) nebulizer solution 3 mL, 3 mL, Nebulization, BID - RT, Park Dubose MD, 3 mL at 06/08/22 0811  •  methylPREDNISolone sodium succinate (SOLU-Medrol) injection 60 mg, 60 mg, Intravenous, Q8H, Park Dubose MD, 60 mg at 06/08/22 1931  •  metoprolol tartrate (LOPRESSOR) injection 5 mg, 5 mg, Intravenous, Q4H PRN, Park Dubose MD, 5 mg at 06/08/22 1211  •  nicotine (NICODERM CQ) 14 MG/24HR patch 1 patch, 1 patch, Transdermal, Q24H, Park Dubose MD, 1 patch at 06/08/22 0857  •  nitroglycerin (NITROSTAT) SL tablet 0.4 mg, 0.4 mg, Sublingual, Q5 Min PRN, Park Dubose MD  •  ondansetron (ZOFRAN) injection 4 mg, 4 mg, Intravenous, Q6H PRN, Park Dubose MD  •  piperacillin-tazobactam (ZOSYN) IVPB 3.375 g in 100 mL NS (CD), 3.375 g, Intravenous, Q8H, Park Dubose MD, 3.375 g at 06/08/22  1735  •  sodium chloride 0.9 % flush 3 mL, 3 mL, Intravenous, Q12H, Park Dubose MD, 3 mL at 06/08/22 0857  •  sodium chloride 0.9 % flush 3-10 mL, 3-10 mL, Intravenous, PRN, Park Dubose MD    Data Review:  All labs (24hrs):   Recent Results (from the past 24 hour(s))   Comprehensive Metabolic Panel    Collection Time: 06/07/22  9:01 PM    Specimen: Blood   Result Value Ref Range    Glucose 140 (H) 65 - 99 mg/dL    BUN 13 6 - 20 mg/dL    Creatinine 0.46 (L) 0.57 - 1.00 mg/dL    Sodium 145 136 - 145 mmol/L    Potassium 3.7 3.5 - 5.2 mmol/L    Chloride 105 98 - 107 mmol/L    CO2 30.0 (H) 22.0 - 29.0 mmol/L    Calcium 8.8 8.6 - 10.5 mg/dL    Total Protein 6.4 6.0 - 8.5 g/dL    Albumin 3.70 3.50 - 5.20 g/dL    ALT (SGPT) 26 1 - 33 U/L    AST (SGOT) 46 (H) 1 - 32 U/L    Alkaline Phosphatase 83 39 - 117 U/L    Total Bilirubin 0.2 0.0 - 1.2 mg/dL    Globulin 2.7 gm/dL    A/G Ratio 1.4 g/dL    BUN/Creatinine Ratio 28.3 (H) 7.0 - 25.0    Anion Gap 10.0 5.0 - 15.0 mmol/L    eGFR 111.1 >60.0 mL/min/1.73   Lactic Acid, Plasma    Collection Time: 06/07/22  9:01 PM    Specimen: Blood   Result Value Ref Range    Lactate 1.2 0.5 - 2.0 mmol/L   CBC Auto Differential    Collection Time: 06/07/22  9:01 PM    Specimen: Blood   Result Value Ref Range    WBC 9.50 3.40 - 10.80 10*3/mm3    RBC 4.38 3.77 - 5.28 10*6/mm3    Hemoglobin 13.5 12.0 - 15.9 g/dL    Hematocrit 41.4 34.0 - 46.6 %    MCV 94.3 79.0 - 97.0 fL    MCH 30.8 26.6 - 33.0 pg    MCHC 32.7 31.5 - 35.7 g/dL    RDW 13.9 12.3 - 15.4 %    RDW-SD 46.8 37.0 - 54.0 fl    MPV 7.3 6.0 - 12.0 fL    Platelets 195 140 - 450 10*3/mm3    Neutrophil % 94.7 (H) 42.7 - 76.0 %    Lymphocyte % 3.0 (L) 19.6 - 45.3 %    Monocyte % 2.3 (L) 5.0 - 12.0 %    Eosinophil % 0.0 (L) 0.3 - 6.2 %    Basophil % 0.0 0.0 - 1.5 %    Neutrophils, Absolute 9.00 (H) 1.70 - 7.00 10*3/mm3    Lymphocytes, Absolute 0.30 (L) 0.70 - 3.10 10*3/mm3    Monocytes, Absolute 0.20 0.10 - 0.90 10*3/mm3     Eosinophils, Absolute 0.00 0.00 - 0.40 10*3/mm3    Basophils, Absolute 0.00 0.00 - 0.20 10*3/mm3    nRBC 0.0 0.0 - 0.2 /100 WBC   Troponin    Collection Time: 06/07/22  9:01 PM    Specimen: Blood   Result Value Ref Range    Troponin T <0.010 0.000 - 0.030 ng/mL   Comprehensive Metabolic Panel    Collection Time: 06/08/22  3:48 AM    Specimen: Blood   Result Value Ref Range    Glucose 120 (H) 65 - 99 mg/dL    BUN 15 6 - 20 mg/dL    Creatinine 0.42 (L) 0.57 - 1.00 mg/dL    Sodium 143 136 - 145 mmol/L    Potassium 3.9 3.5 - 5.2 mmol/L    Chloride 104 98 - 107 mmol/L    CO2 30.0 (H) 22.0 - 29.0 mmol/L    Calcium 8.5 (L) 8.6 - 10.5 mg/dL    Total Protein 5.8 (L) 6.0 - 8.5 g/dL    Albumin 3.10 (L) 3.50 - 5.20 g/dL    ALT (SGPT) 26 1 - 33 U/L    AST (SGOT) 42 (H) 1 - 32 U/L    Alkaline Phosphatase 74 39 - 117 U/L    Total Bilirubin 0.2 0.0 - 1.2 mg/dL    Globulin 2.7 gm/dL    A/G Ratio 1.1 g/dL    BUN/Creatinine Ratio 35.7 (H) 7.0 - 25.0    Anion Gap 9.0 5.0 - 15.0 mmol/L    eGFR 113.5 >60.0 mL/min/1.73   CBC Auto Differential    Collection Time: 06/08/22  3:48 AM    Specimen: Blood   Result Value Ref Range    WBC 7.90 3.40 - 10.80 10*3/mm3    RBC 4.11 3.77 - 5.28 10*6/mm3    Hemoglobin 12.8 12.0 - 15.9 g/dL    Hematocrit 38.9 34.0 - 46.6 %    MCV 94.7 79.0 - 97.0 fL    MCH 31.0 26.6 - 33.0 pg    MCHC 32.8 31.5 - 35.7 g/dL    RDW 13.5 12.3 - 15.4 %    RDW-SD 45.9 37.0 - 54.0 fl    MPV 7.5 6.0 - 12.0 fL    Platelets 164 140 - 450 10*3/mm3    Neutrophil % 89.9 (H) 42.7 - 76.0 %    Lymphocyte % 5.4 (L) 19.6 - 45.3 %    Monocyte % 4.2 (L) 5.0 - 12.0 %    Eosinophil % 0.1 (L) 0.3 - 6.2 %    Basophil % 0.4 0.0 - 1.5 %    Neutrophils, Absolute 7.10 (H) 1.70 - 7.00 10*3/mm3    Lymphocytes, Absolute 0.40 (L) 0.70 - 3.10 10*3/mm3    Monocytes, Absolute 0.30 0.10 - 0.90 10*3/mm3    Eosinophils, Absolute 0.00 0.00 - 0.40 10*3/mm3    Basophils, Absolute 0.00 0.00 - 0.20 10*3/mm3    nRBC 0.1 0.0 - 0.2 /100 WBC   Adult Transthoracic Echo  Complete W/ Cont if Necessary Per Protocol    Collection Time: 06/08/22  8:44 AM   Result Value Ref Range    Target HR (85%) 138 bpm    Max. Pred. HR (100%) 162 bpm    ACS 1.57 cm    Ao root diam 2.9 cm    Ao pk licah 124.2 cm/sec    Ao V2 VTI 25.8 cm    JOE(I,D) 2.35 cm2    EDV(cubed) 84.4 ml    EDV(MOD-sp4) 87.4 ml    EF(MOD-bp) 55.0 %    EF(MOD-sp4) 54.6 %    ESV(cubed) 41.2 ml    ESV(MOD-sp4) 39.7 ml    IVS/LVPW 0.86 cm    LV mass(C)d 132.6 grams    LV V1 max PG 2.38 mmHg    LV V1 mean PG 1.35 mmHg    LV V1 max 77.2 cm/sec    LVPWd 0.99 cm    MV dec slope 431.8 cm/sec2    MV dec time 0.15 msec    MV V2 VTI 17.9 cm    MVA(VTI) 3.4 cm2    PA V2 max 116.1 cm/sec    RV V1 max PG 3.8 mmHg    RV V1 max 97.6 cm/sec    RV V1 VTI 22.1 cm    RVIDd 3.1 cm    SI(MOD-sp4) 22.6 ml/m2    SV(LVOT) 60.7 ml    SV(MOD-sp4) 47.7 ml    Ao max PG 6.2 mmHg    Ao mean PG 3.2 mmHg    FS 21.3 %    IVSd 0.86 cm    LV V1 VTI 17.3 cm    LVIDd 4.4 cm    LVIDs 3.5 cm    LVOT area 3.5 cm2    LVOT diam 2.11 cm    MV E/A 1.11     MV max PG 3.7 mmHg    MV mean PG 2.01 mmHg    MV A max licha 59.9 cm/sec    MV E max licha 66.4 cm/sec    LV Olsen Vol (BSA corrected) 41.5 cm2    LV Sys Vol (BSA corrected) 18.8 cm2    LA length (A2C) 3.3 cm        Imaging:  Adult Transthoracic Echo Complete W/ Cont if Necessary Per Protocol  · Estimated left ventricular EF was in agreement with the calculated left   ventricular EF. Left ventricular ejection fraction appears to be 56 - 60%.  · Left ventricular diastolic function is consistent with (grade II w/high   LAP) pseudonormalization.  · The study is technically difficult for diagnosis.          ASSESSMENT:  Pneumonia  COPD  History of carcinosarcoma of the endometrium s/p robotic radical hysterectomy   Hx invasive right breat cancer        PLAN:  Antibiotics  Bronchodilator  Inhaled corticosteroids  IV steroids  Viral panel   Titrate O2 as tolerated to keep sats > 88%  Electrolytes/ glycemic control  DVT and GI  prophylaxis    Discussed with Dr. Dakotah Senior, DILEEP   6/8/2022  19:37 EDT     I personally have examined  and interviewed the patient. I have reviewed the history, data, problems, assessment and plan with our NP.  Total Critical care time in direct medical management (   ) minutes, This time specifically excludes time spent performing procedures.    Jaswinder Claire MD   6/8/2022  21:56 EDT

## 2022-06-08 NOTE — CASE MANAGEMENT/SOCIAL WORK
Discharge Planning Assessment  [facility]     Patient Name: Xin Nj  MRN: [id number]  Today's Date: 6/8/2022    Admit Date: 6/7/2022     Discharge Needs Assessment     Row Name 06/08/22 1505       Living Environment    People in Home child(stoney), adult    Current Living Arrangements home    Primary Care Provided by self    Provides Primary Care For no one    Family Caregiver if Needed child(stoney), adult    Quality of Family Relationships supportive    Able to Return to Prior Arrangements yes       Resource/Environmental Concerns    Resource/Environmental Concerns none    Transportation Concerns none       Transition Planning    Patient/Family Anticipates Transition to inpatient rehabilitation facility    Patient/Family Anticipated Services at Transition none    Transportation Anticipated family or friend will provide       Discharge Needs Assessment    Readmission Within the Last 30 Days no previous admission in last 30 days    Equipment Currently Used at Home shower chair    Concerns to be Addressed discharge planning    Anticipated Changes Related to Illness none    Equipment Needed After Discharge none    Outpatient/Agency/Support Group Needs skilled nursing facility    Discharge Facility/Level of Care Needs nursing facility, skilled    Provided Post Acute Provider List? Yes    Post Acute Provider List Inpatient Rehab    Provided Post Acute Provider Quality & Resource List? Yes    Post Acute Provider Quality and Resource List Inpatient Rehab    Delivered To Patient;Support Person    Method of Delivery In person               Discharge Plan     Row Name 06/08/22 1507       Plan    Plan Rehab recommended. CM to follow-up for rehab choices. SNF list provided to patient and daughter to review. Precert required. PASRR per facility.    Patient/Family in Agreement with Plan yes    Plan Comments Met with pt and daughter in room.  Pt lives at home, is IADLs, and her daughter Socorro lives with her.  PCP and pharmacy  verified.  Pt denies trouble affording home medications.  Pt agreeable to IP rehab, list provided to her and her daughter.  Pt would like facility in [place] - reviewed with patient and daughter.  Followed up later in day in patient room to obtain rehab choices.  Pt wanted me to contact her daughter to make decision.  Attempted to call patient daughter with no answer, and full VM box.  CM to follow-up tomorrow for choices.  Barriers to d/c: IV steroids, IV abx, O2 @ 4L via n/c.              Continued Care and Services - Admitted Since 6/7/2022           Expected Discharge Date and Time     Expected Discharge Date Expected Discharge Time    Jun 13, 2022          Demographic Summary     Row Name 06/08/22 1503       General Information    Admission Type inpatient    Arrived From emergency department    Referral Source admission list    Reason for Consult discharge planning    Preferred Language English               Functional Status     Row Name 06/08/22 1505       Functional Status    Usual Activity Tolerance good    Current Activity Tolerance moderate       Functional Status, IADL    Medications independent    Meal Preparation assistive equipment and person    Housekeeping assistive equipment and person    Laundry assistive equipment and person    Shopping assistive equipment and person       Mental Status    General Appearance WDL WDL       Mental Status Summary    Recent Changes in Mental Status/Cognitive Functioning no changes                      Megan Buitrago RN

## 2022-06-08 NOTE — PLAN OF CARE
Goal Outcome Evaluation:  Plan of Care Reviewed With: patient           Outcome Evaluation: 57 y/o female presenting to [facility] on 6/7 due to COPD exacerbation. Pt reports independent PLOF. At time of evaluation pt required CGA for transfers and grooming at sink. Pt min A for functional mobility seconary to needing A with RW. Pt far from functional baseline and will benefit from short IP rehab stay to increase endurance and activity tolerance for safe d/c home.

## 2022-06-08 NOTE — THERAPY EVALUATION
Patient Name: Xin Nj  : 1965    MRN: [id number]                              Today's Date: 2022       Admit Date: 2022    Visit Dx: No diagnosis found.  Patient Active Problem List   Diagnosis   • Malignant neoplasm of endometrium (HCC)   • Chemotherapy induced neutropenia (HCC)   • Arthritis   • Chronic obstructive pulmonary disease (HCC)   • Fibromyalgia   • Knee pain   • Low back pain   • Malignant neoplasm of breast (HCC)   • Malignant neoplasm of uterus (HCC)   • Obesity   • Pneumonia     Past Medical History:   Diagnosis Date   • Anxiety    • COPD (chronic obstructive pulmonary disease) (CMS/HCC)    • COPD (chronic obstructive pulmonary disease) (CMS/HCC)    • Depression    • Encounter for antineoplastic radiation therapy    • Endometrial cancer (CMS/HCC)    • History of right breast cancer    • Osteoporosis      Past Surgical History:   Procedure Laterality Date   • BREAST LUMPECTOMY Right    • HYSTERECTOMY        General Information     Row Name 22          Physical Therapy Time and Intention    Document Type evaluation  -CR     Mode of Treatment physical therapy  -CR     Row Name 22          General Information    Patient Profile Reviewed yes  -CR     Prior Level of Function independent:;all household mobility;ADL's  uses cane and has shower chair  -CR     Barriers to Rehab ineffective coping  -CR     Row Name 22 09          Living Environment    People in Home child(stoney), adult  children able to assist but works during day  -CR     Row Name 22          Cognition    Orientation Status (Cognition) oriented x 4  -CR     Row Name 22 09          Safety Issues, Functional Mobility    Safety Issues Affecting Function (Mobility) safety precautions follow-through/compliance  -CR     Impairments Affecting Function (Mobility) endurance/activity tolerance;shortness of breath;postural/trunk control  -CR           User Key  (r) = Recorded By,  (t) = Taken By, (c) = Cosigned By    Initials Name Provider Type    CR Reyes, Carmela, PT Physical Therapist               Mobility     Row Name 06/08/22 0905          Bed Mobility    Comment, (Bed Mobility) sitting EOB  -CR     Row Name 06/08/22 0905          Sit-Stand Transfer    Sit-Stand Barnes (Transfers) contact guard  -CR     Assistive Device (Sit-Stand Transfers) walker, front-wheeled  -CR     Row Name 06/08/22 0905          Gait/Stairs (Locomotion)    Barnes Level (Gait) contact guard  -CR     Assistive Device (Gait) walker, front-wheeled  -CR     Distance in Feet (Gait) 20  -CR     Deviations/Abnormal Patterns (Gait) gait speed decreased  -CR     Bilateral Gait Deviations forward flexed posture  -CR           User Key  (r) = Recorded By, (t) = Taken By, (c) = Cosigned By    Initials Name Provider Type    CR Reyes, Carmela, PT Physical Therapist               Obj/Interventions     Row Name 06/08/22 0905          Range of Motion Comprehensive    General Range of Motion bilateral lower extremity ROM WFL  -CR     Row Name 06/08/22 0905          Strength Comprehensive (MMT)    Comment, General Manual Muscle Testing (MMT) Assessment BLE grossly wfl  -CR     Row Name 06/08/22 0905          Balance    Balance Assessment sitting static balance;sitting dynamic balance;standing static balance;standing dynamic balance  -CR     Static Sitting Balance independent  -CR     Dynamic Sitting Balance independent  -CR     Position, Sitting Balance sitting edge of bed  -CR     Static Standing Balance standby assist  -CR     Dynamic Standing Balance contact guard  -CR     Position/Device Used, Standing Balance walker, front-wheeled  -CR     Row Name 06/08/22 0905          Sensory Assessment (Somatosensory)    Sensory Assessment (Somatosensory) LE sensation intact  -CR           User Key  (r) = Recorded By, (t) = Taken By, (c) = Cosigned By    Initials Name Provider Type    CR Reyes, Carmela, PT Physical Therapist                Goals/Plan     Row Name 06/08/22 0951          Bed Mobility Goal 1 (PT)    Activity/Assistive Device (Bed Mobility Goal 1, PT) sit to supine/supine to sit  -CR     Obion Level/Cues Needed (Bed Mobility Goal 1, PT) modified independence  -CR     Time Frame (Bed Mobility Goal 1, PT) 1 week  -CR     Row Name 06/08/22 0951          Gait Training Goal 1 (PT)    Activity/Assistive Device (Gait Training Goal 1, PT) gait (walking locomotion);assistive device use;increase endurance/gait distance;improve balance and speed;walker, rolling  -CR     Obion Level (Gait Training Goal 1, PT) standby assist  -CR     Distance (Gait Training Goal 1, PT) 50 ft x 2 with RPE 5/10  -CR     Time Frame (Gait Training Goal 1, PT) 1 week  -CR     Row Name 06/08/22 0951          Patient Education Goal (PT)    Activity (Patient Education Goal, PT) HEP with RPE 2/10  -CR     Obion/Cues/Accuracy (Memory Goal 2, PT) demonstrates adequately  -CR     Time Frame (Patient Education Goal, PT) 1 week  -CR     Row Name 06/08/22 0951          Therapy Assessment/Plan (PT)    Planned Therapy Interventions (PT) gait training;home exercise program;patient/family education;strengthening;bed mobility training  -CR           User Key  (r) = Recorded By, (t) = Taken By, (c) = Cosigned By    Initials Name Provider Type    CR Reyes, Carmela, PT Physical Therapist               Clinical Impression     Row Name 06/08/22 0906          Pain    Pretreatment Pain Rating 0/10 - no pain  -CR     Posttreatment Pain Rating 0/10 - no pain  -CR     Row Name 06/08/22 0906          Plan of Care Review    Plan of Care Reviewed With patient;daughter  -CR     Outcome Evaluation 57 y/o female admitted on 6/7 due to COPD exacerbation and currently on 4l/nc. Patient normally not on supplemental O2 at home and independent with mobility and household activities using cane. At time of eval, patient able to ambulate 20 ft with rw and CGA. Chris Scale at rest  5/10 and 9/10 at end of gait. Patient is below her reported baseline level of function and will benefit from short IP rehab prior to return home.  -CR     Row Name 06/08/22 0906          Therapy Assessment/Plan (PT)    Patient/Family Therapy Goals Statement (PT) get better, home  -CR     Rehab Potential (PT) good, to achieve stated therapy goals  -CR     Criteria for Skilled Interventions Met (PT) yes;skilled treatment is necessary  -CR     Therapy Frequency (PT) 3 times/wk  -CR     Predicted Duration of Therapy Intervention (PT) dc  -CR           User Key  (r) = Recorded By, (t) = Taken By, (c) = Cosigned By    Initials Name Provider Type    CR Reyes, Carmela, PT Physical Therapist               Outcome Measures     Row Name 06/08/22 1005          How much help from another person do you currently need...    Turning from your back to your side while in flat bed without using bedrails? 3  -CR     Moving from lying on back to sitting on the side of a flat bed without bedrails? 3  -CR     Moving to and from a bed to a chair (including a wheelchair)? 4  -CR     Standing up from a chair using your arms (e.g., wheelchair, bedside chair)? 4  -CR     Climbing 3-5 steps with a railing? 3  -CR     To walk in hospital room? 3  -CR     AM-PAC 6 Clicks Score (PT) 20  -CR     Highest level of mobility 6 --> Walked 10 steps or more  -CR     Row Name 06/08/22 1005          Functional Assessment    Outcome Measure Options AM-PAC 6 Clicks Basic Mobility (PT)  -CR           User Key  (r) = Recorded By, (t) = Taken By, (c) = Cosigned By    Initials Name Provider Type    CR Reyes, Carmela, PT Physical Therapist                             Physical Therapy Education                 Title: PT OT SLP Therapies (In Progress)     Topic: Physical Therapy (In Progress)     Point: Mobility training (In Progress)     Learning Progress Summary           Patient Acceptance, E, NR by CR at 6/8/2022 1005                   Point: Home exercise  program (Not Started)     Learner Progress:  Not documented in this visit.          Point: Body mechanics (Not Started)     Learner Progress:  Not documented in this visit.          Point: Precautions (Not Started)     Learner Progress:  Not documented in this visit.                      User Key     Initials Effective Dates Name Provider Type Discipline    CR 06/16/21 -  Reyes, Carmela, PT Physical Therapist PT              PT Recommendation and Plan  Planned Therapy Interventions (PT): gait training, home exercise program, patient/family education, strengthening, bed mobility training  Plan of Care Reviewed With: patient, daughter  Outcome Evaluation: 57 y/o female admitted on 6/7 due to COPD exacerbation and currently on 4l/nc. Patient normally not on supplemental O2 at home and independent with mobility and household activities using cane. At time of eval, patient able to ambulate 20 ft with rw and CGA. Chris Scale at rest 5/10 and 9/10 at end of gait. Patient is below her reported baseline level of function and will benefit from short IP rehab prior to return home.     Time Calculation:    PT Charges     Row Name 06/08/22 1006             Time Calculation    Start Time 0811  -CR      Stop Time 0836  -CR      Time Calculation (min) 25 min  -CR      PT Received On 06/08/22  -CR      PT - Next Appointment 06/09/22  -CR      PT Goal Re-Cert Due Date 06/22/22  -CR              Time Calculation- PT    Total Timed Code Minutes- PT 0 minute(s)  -CR            User Key  (r) = Recorded By, (t) = Taken By, (c) = Cosigned By    Initials Name Provider Type    CR Reyes, Carmela, PT Physical Therapist              Therapy Charges for Today     Code Description Service Date Service Provider Modifiers Qty    47506484397 HC PT EVAL LOW COMPLEXITY 4 6/8/2022 Reyes, Carmela, PT GP 1          PT G-Codes  Outcome Measure Options: AM-PAC 6 Clicks Basic Mobility (PT)  AM-PAC 6 Clicks Score (PT): 20    Carmela Reyes, PT  6/8/2022

## 2022-06-08 NOTE — H&P
Patient Care Team:  Park Dubose MD as PCP - General (Family Medicine)    Chief complaint SOB and cough    Subjective     Patient is a 56 y.o. female presents with increasing SOB and cough for several days, went to [facility], and admitted to [facility] for Pneumonia and COPD Exacerbation, started treatment, but pt continued to get worse and the family did not like the care they received , so they requested to be transferred to [facility] under my care for further evaluation and treatment     Review of Systems   Constitutional: Positive for activity change and fatigue.   Eyes: Negative.    Respiratory: Positive for cough, shortness of breath and wheezing.    Cardiovascular: Positive for leg swelling.   Gastrointestinal: Positive for nausea.   Endocrine: Negative.    Genitourinary: Negative.    Musculoskeletal: Positive for back pain and neck pain.   Neurological: Positive for dizziness, light-headedness and headaches.   Psychiatric/Behavioral: The patient is nervous/anxious.            History  Past Medical History:   Diagnosis Date   • Anxiety    • COPD (chronic obstructive pulmonary disease) (CMS/HCC)    • COPD (chronic obstructive pulmonary disease) (CMS/HCC)    • Depression    • Encounter for antineoplastic radiation therapy    • Endometrial cancer (CMS/[facility])    • History of right breast cancer    • Osteoporosis      Past Surgical History:   Procedure Laterality Date   • BREAST LUMPECTOMY Right    • HYSTERECTOMY  2020     Family History   Problem Relation Age of Onset   • Stroke Mother    • Lung cancer Brother      Social History     Tobacco Use   • Smoking status: Current Every Day Smoker   • Smokeless tobacco: Never Used   • Tobacco comment: down to 1 ppd   Vaping Use   • Vaping Use: Never used   Substance Use Topics   • Alcohol use: Not Currently   • Drug use: Never     Medications Prior to Admission   Medication Sig Dispense Refill Last Dose   • albuterol sulfate  (90 Base) MCG/ACT inhaler Inhale 2  puffs Every 4 (Four) Hours As Needed.      • budesonide-formoterol (SYMBICORT) 160-4.5 MCG/ACT inhaler Inhale 2 puffs 2 (Two) Times a Day.      • calcium carbonate-vitamin d 600-400 MG-UNIT per tablet Take 1 tablet by mouth Daily.      • cholecalciferol (VITAMIN D3) 25 MCG (1000 UT) tablet Take 1,000 Units by mouth Daily.      • gabapentin (NEURONTIN) 600 MG tablet Take 600 mg by mouth 3 (Three) Times a Day.      • HYDROcodone-acetaminophen (NORCO)  MG per tablet Take 1 tablet by mouth Every 6 (Six) Hours As Needed for Moderate Pain . 28 tablet 0    • ipratropium-albuterol (DUO-NEB) 0.5-2.5 mg/3 ml nebulizer Take 3 mL by nebulization Every 6 (Six) Hours As Needed for Wheezing.        Allergies:  Patient has no known allergies.    Objective     Vital Signs  Temp:  [97 °F (36.1 °C)-98.3 °F (36.8 °C)] 98.3 °F (36.8 °C)  Heart Rate:  [93] 93  Resp:  [20] 20  BP: (169-172)/(101-108) 172/108    Physical Exam:       General Appearance:    Alert, cooperative, in mild acute distress, using accessory muscles to breath   Eyes:            Lids and lashes normal, conjunctivae and sclerae normal, no   icterus, no pallor, corneas clear, PERRLA   Ears:    Ears appear intact with no abnormalities noted   Throat:   No oral lesions, no thrush, oral mucosa moist   Neck:   No adenopathy, supple, trachea midline, no thyromegaly, no   carotid bruit, no JVD   Lungs:     Wheezing and crackles to auscultation,respirations regular, even but mildly labored                   Heart:    Regular rhythm and normal rate, normal S1 and S2, no            murmur, no gallop, no rub, no click   Abdomen:     Normal bowel sounds, no masses, no organomegaly, soft        non-tender, non-distended, no guarding, no rebound                tenderness   Extremities:   Moves all extremities well, 1+ edema, no cyanosis, no             redness   Pulses:   Pulses palpable and equal bilaterally   Skin:   No bleeding, bruising or rash   Neurologic:   Cranial  nerves 2 - 12 grossly intact, sensation intact, DTR       present and equal bilaterally       Results Review:  Lab Results (last 48 hours)     ** No results found for the last 48 hours. **          Imaging Results (Last 24 Hours)     Procedure Component Value Units Date/Time    XR Chest PA & Lateral [[id number]] Collected: 06/07/22 2006     Updated: 06/07/22 2008    Narrative:      DATE OF EXAM:  6/7/2022 6:59 PM     PROCEDURE:  XR CHEST PA AND LATERAL-     INDICATIONS:  Shortness of breath. History of tobacco abuse and COPD.       COMPARISON:  No Comparisons Available     TECHNIQUE:   Two radiologic views of the chest.     FINDINGS:  The heart size is normal. The pulmonary vascular markings are normal.  The lungs and pleural spaces are clear of active disease.  There is a  mild thoracic spondylosis. There is a left-sided port in place. The tip  terminates in the superior vena cava.       Impression:      No active disease.     Electronically Signed By-Khanh Gold MD On:6/7/2022 8:06 PM  This report was finalized on [id number] by  Khanh Gold MD.           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

## 2022-06-08 NOTE — NURSING NOTE
RN went in to give patient lunch meds along with pain pill and xanax. Patient took pain pill but then attempted to hide xanax pill under the sheets. RN pulled pill out of sheets and explained to patient that she had to take the pill while I was in there, RN could not leave a controlled substance in room with patient. Patient did not want to take whole dose, RN told patient she could cut the pill in half for patient or she could take the whole pill. Patient did not want RN to cut pill in half because RN would have to go waste the other half of the pill. Patient decided on taking whole pill.

## 2022-06-08 NOTE — PLAN OF CARE
Goal Outcome Evaluation:  Plan of Care Reviewed With: patient, daughter           Outcome Evaluation: 55 y/o female admitted on 6/7 due to COPD exacerbation and currently on 4l/nc. Patient normally not on supplemental O2 at home and independent with mobility and household activities using cane. At time of eval, patient able to ambulate 20 ft with rw and CGA. Chris Scale at rest 5/10 and 9/10 at end of gait. Patient is below her reported baseline level of function and will benefit from short IP rehab prior to return home.

## 2022-06-09 LAB
ALBUMIN SERPL-MCNC: 3.3 G/DL (ref 3.5–5.2)
ALBUMIN/GLOB SERPL: 1.5 G/DL
ALP SERPL-CCNC: 69 U/L (ref 39–117)
ALT SERPL W P-5'-P-CCNC: 38 U/L (ref 1–33)
ANION GAP SERPL CALCULATED.3IONS-SCNC: 9 MMOL/L (ref 5–15)
AST SERPL-CCNC: 42 U/L (ref 1–32)
B PARAPERT DNA SPEC QL NAA+PROBE: NOT DETECTED
B PERT DNA SPEC QL NAA+PROBE: NOT DETECTED
BASOPHILS # BLD AUTO: 0 10*3/MM3 (ref 0–0.2)
BASOPHILS NFR BLD AUTO: 0.1 % (ref 0–1.5)
BILIRUB SERPL-MCNC: <0.2 MG/DL (ref 0–1.2)
BUN SERPL-MCNC: 27 MG/DL (ref 6–20)
BUN/CREAT SERPL: 39.1 (ref 7–25)
C PNEUM DNA NPH QL NAA+NON-PROBE: NOT DETECTED
CALCIUM SPEC-SCNC: 8.5 MG/DL (ref 8.6–10.5)
CHLORIDE SERPL-SCNC: 103 MMOL/L (ref 98–107)
CO2 SERPL-SCNC: 32 MMOL/L (ref 22–29)
CREAT SERPL-MCNC: 0.69 MG/DL (ref 0.57–1)
DEPRECATED RDW RBC AUTO: 45.1 FL (ref 37–54)
EGFRCR SERPLBLD CKD-EPI 2021: 100.7 ML/MIN/1.73
EOSINOPHIL # BLD AUTO: 0 10*3/MM3 (ref 0–0.4)
EOSINOPHIL NFR BLD AUTO: 0 % (ref 0.3–6.2)
ERYTHROCYTE [DISTWIDTH] IN BLOOD BY AUTOMATED COUNT: 13.5 % (ref 12.3–15.4)
FLUAV SUBTYP SPEC NAA+PROBE: NOT DETECTED
FLUBV RNA ISLT QL NAA+PROBE: NOT DETECTED
GLOBULIN UR ELPH-MCNC: 2.2 GM/DL
GLUCOSE SERPL-MCNC: 174 MG/DL (ref 65–99)
HADV DNA SPEC NAA+PROBE: NOT DETECTED
HCOV 229E RNA SPEC QL NAA+PROBE: NOT DETECTED
HCOV HKU1 RNA SPEC QL NAA+PROBE: NOT DETECTED
HCOV NL63 RNA SPEC QL NAA+PROBE: NOT DETECTED
HCOV OC43 RNA SPEC QL NAA+PROBE: NOT DETECTED
HCT VFR BLD AUTO: 37.1 % (ref 34–46.6)
HGB BLD-MCNC: 12.1 G/DL (ref 12–15.9)
HMPV RNA NPH QL NAA+NON-PROBE: NOT DETECTED
HPIV1 RNA ISLT QL NAA+PROBE: NOT DETECTED
HPIV2 RNA SPEC QL NAA+PROBE: NOT DETECTED
HPIV3 RNA NPH QL NAA+PROBE: DETECTED
HPIV4 P GENE NPH QL NAA+PROBE: NOT DETECTED
LYMPHOCYTES # BLD AUTO: 0.3 10*3/MM3 (ref 0.7–3.1)
LYMPHOCYTES NFR BLD AUTO: 5.5 % (ref 19.6–45.3)
M PNEUMO IGG SER IA-ACNC: NOT DETECTED
MCH RBC QN AUTO: 30.7 PG (ref 26.6–33)
MCHC RBC AUTO-ENTMCNC: 32.5 G/DL (ref 31.5–35.7)
MCV RBC AUTO: 94.6 FL (ref 79–97)
MONOCYTES # BLD AUTO: 0.2 10*3/MM3 (ref 0.1–0.9)
MONOCYTES NFR BLD AUTO: 4.4 % (ref 5–12)
NEUTROPHILS NFR BLD AUTO: 4.9 10*3/MM3 (ref 1.7–7)
NEUTROPHILS NFR BLD AUTO: 90 % (ref 42.7–76)
NRBC BLD AUTO-RTO: 0.1 /100 WBC (ref 0–0.2)
PLATELET # BLD AUTO: 170 10*3/MM3 (ref 140–450)
PMV BLD AUTO: 7.6 FL (ref 6–12)
POTASSIUM SERPL-SCNC: 3.8 MMOL/L (ref 3.5–5.2)
PROT SERPL-MCNC: 5.5 G/DL (ref 6–8.5)
RBC # BLD AUTO: 3.92 10*6/MM3 (ref 3.77–5.28)
RHINOVIRUS RNA SPEC NAA+PROBE: NOT DETECTED
RSV RNA NPH QL NAA+NON-PROBE: NOT DETECTED
SARS-COV-2 RNA NPH QL NAA+NON-PROBE: NOT DETECTED
SODIUM SERPL-SCNC: 144 MMOL/L (ref 136–145)
WBC NRBC COR # BLD: 5.4 10*3/MM3 (ref 3.4–10.8)

## 2022-06-09 PROCEDURE — 94760 N-INVAS EAR/PLS OXIMETRY 1: CPT

## 2022-06-09 PROCEDURE — 0202U NFCT DS 22 TRGT SARS-COV-2: CPT | Performed by: INTERNAL MEDICINE

## 2022-06-09 PROCEDURE — 25010000002 ENOXAPARIN PER 10 MG: Performed by: FAMILY MEDICINE

## 2022-06-09 PROCEDURE — G0378 HOSPITAL OBSERVATION PER HR: HCPCS

## 2022-06-09 PROCEDURE — 94799 UNLISTED PULMONARY SVC/PX: CPT

## 2022-06-09 PROCEDURE — 25010000002 FUROSEMIDE PER 20 MG: Performed by: FAMILY MEDICINE

## 2022-06-09 PROCEDURE — 25010000002 METHYLPREDNISOLONE PER 125 MG: Performed by: FAMILY MEDICINE

## 2022-06-09 PROCEDURE — 85025 COMPLETE CBC W/AUTO DIFF WBC: CPT | Performed by: FAMILY MEDICINE

## 2022-06-09 PROCEDURE — 80053 COMPREHEN METABOLIC PANEL: CPT | Performed by: FAMILY MEDICINE

## 2022-06-09 PROCEDURE — 97116 GAIT TRAINING THERAPY: CPT

## 2022-06-09 PROCEDURE — 25010000002 PIPERACILLIN SOD-TAZOBACTAM PER 1 G: Performed by: FAMILY MEDICINE

## 2022-06-09 PROCEDURE — 94664 DEMO&/EVAL PT USE INHALER: CPT

## 2022-06-09 PROCEDURE — 97530 THERAPEUTIC ACTIVITIES: CPT

## 2022-06-09 RX ORDER — FUROSEMIDE 10 MG/ML
40 INJECTION INTRAMUSCULAR; INTRAVENOUS DAILY
Status: COMPLETED | OUTPATIENT
Start: 2022-06-09 | End: 2022-06-13

## 2022-06-09 RX ORDER — NICOTINE 21 MG/24HR
1 PATCH, TRANSDERMAL 24 HOURS TRANSDERMAL
Status: DISCONTINUED | OUTPATIENT
Start: 2022-06-09 | End: 2022-06-13 | Stop reason: HOSPADM

## 2022-06-09 RX ADMIN — METHYLPREDNISOLONE SODIUM SUCCINATE 60 MG: 125 INJECTION, POWDER, FOR SOLUTION INTRAMUSCULAR; INTRAVENOUS at 01:00

## 2022-06-09 RX ADMIN — DOCUSATE SODIUM 100 MG: 100 CAPSULE, LIQUID FILLED ORAL at 08:15

## 2022-06-09 RX ADMIN — METHYLPREDNISOLONE SODIUM SUCCINATE 60 MG: 125 INJECTION, POWDER, FOR SOLUTION INTRAMUSCULAR; INTRAVENOUS at 20:21

## 2022-06-09 RX ADMIN — ALPRAZOLAM 0.5 MG: 0.5 TABLET ORAL at 08:22

## 2022-06-09 RX ADMIN — FAMOTIDINE 40 MG: 20 TABLET ORAL at 08:15

## 2022-06-09 RX ADMIN — BUDESONIDE 0.5 MG: 0.5 INHALANT RESPIRATORY (INHALATION) at 19:38

## 2022-06-09 RX ADMIN — IPRATROPIUM BROMIDE AND ALBUTEROL SULFATE 3 ML: .5; 3 SOLUTION RESPIRATORY (INHALATION) at 06:25

## 2022-06-09 RX ADMIN — ALPRAZOLAM 0.5 MG: 0.5 TABLET ORAL at 22:02

## 2022-06-09 RX ADMIN — BUDESONIDE 0.5 MG: 0.5 INHALANT RESPIRATORY (INHALATION) at 06:29

## 2022-06-09 RX ADMIN — Medication 1000 UNITS: at 08:15

## 2022-06-09 RX ADMIN — IPRATROPIUM BROMIDE AND ALBUTEROL SULFATE 3 ML: .5; 3 SOLUTION RESPIRATORY (INHALATION) at 19:30

## 2022-06-09 RX ADMIN — HYDROCODONE BITARTRATE AND ACETAMINOPHEN 1 TABLET: 10; 325 TABLET ORAL at 15:20

## 2022-06-09 RX ADMIN — ALPRAZOLAM 0.5 MG: 0.5 TABLET ORAL at 15:20

## 2022-06-09 RX ADMIN — GABAPENTIN 600 MG: 600 TABLET, FILM COATED ORAL at 08:16

## 2022-06-09 RX ADMIN — FUROSEMIDE 40 MG: 10 INJECTION, SOLUTION INTRAMUSCULAR; INTRAVENOUS at 17:53

## 2022-06-09 RX ADMIN — HYDROCODONE BITARTRATE AND ACETAMINOPHEN 1 TABLET: 10; 325 TABLET ORAL at 22:02

## 2022-06-09 RX ADMIN — METHYLPREDNISOLONE SODIUM SUCCINATE 60 MG: 125 INJECTION, POWDER, FOR SOLUTION INTRAMUSCULAR; INTRAVENOUS at 11:09

## 2022-06-09 RX ADMIN — GABAPENTIN 600 MG: 600 TABLET, FILM COATED ORAL at 15:20

## 2022-06-09 RX ADMIN — Medication 3 ML: at 20:21

## 2022-06-09 RX ADMIN — GABAPENTIN 600 MG: 600 TABLET, FILM COATED ORAL at 20:21

## 2022-06-09 RX ADMIN — DOCUSATE SODIUM 100 MG: 100 CAPSULE, LIQUID FILLED ORAL at 20:21

## 2022-06-09 RX ADMIN — PIPERACILLIN AND TAZOBACTAM 3.38 G: 3; .375 INJECTION, POWDER, LYOPHILIZED, FOR SOLUTION INTRAVENOUS at 11:09

## 2022-06-09 RX ADMIN — HYDROCODONE BITARTRATE AND ACETAMINOPHEN 1 TABLET: 10; 325 TABLET ORAL at 08:22

## 2022-06-09 RX ADMIN — PIPERACILLIN AND TAZOBACTAM 3.38 G: 3; .375 INJECTION, POWDER, LYOPHILIZED, FOR SOLUTION INTRAVENOUS at 01:00

## 2022-06-09 RX ADMIN — ENOXAPARIN SODIUM 40 MG: 100 INJECTION SUBCUTANEOUS at 15:20

## 2022-06-09 RX ADMIN — NICOTINE 1 PATCH: 14 PATCH, EXTENDED RELEASE TRANSDERMAL at 08:17

## 2022-06-09 RX ADMIN — Medication 3 ML: at 08:16

## 2022-06-09 RX ADMIN — PIPERACILLIN AND TAZOBACTAM 3.38 G: 3; .375 INJECTION, POWDER, LYOPHILIZED, FOR SOLUTION INTRAVENOUS at 17:00

## 2022-06-09 RX ADMIN — CALCIUM CARBONATE-VITAMIN D TAB 500 MG-200 UNIT 1 TABLET: 500-200 TAB at 08:15

## 2022-06-09 RX ADMIN — NICOTINE 1 PATCH: 21 PATCH, EXTENDED RELEASE TRANSDERMAL at 16:58

## 2022-06-09 NOTE — THERAPY EVALUATION
Patient Name: Xin Nj  : 1965    MRN: [id number]                              Today's Date: 2022       Admit Date: 2022    Visit Dx: No diagnosis found.  Patient Active Problem List   Diagnosis   • Malignant neoplasm of endometrium (HCC)   • Chemotherapy induced neutropenia (HCC)   • Arthritis   • Chronic obstructive pulmonary disease (HCC)   • Fibromyalgia   • Knee pain   • Low back pain   • Malignant neoplasm of breast (HCC)   • Malignant neoplasm of uterus (HCC)   • Obesity   • Pneumonia   • COPD (chronic obstructive pulmonary disease) (HCC)     Past Medical History:   Diagnosis Date   • Anxiety    • COPD (chronic obstructive pulmonary disease) (CMS/HCC)    • COPD (chronic obstructive pulmonary disease) (CMS/HCC)    • Depression    • Encounter for antineoplastic radiation therapy    • Endometrial cancer (CMS/HCC)    • History of right breast cancer    • Osteoporosis      Past Surgical History:   Procedure Laterality Date   • BREAST LUMPECTOMY Right    • HYSTERECTOMY        General Information     Row Name 22 1427          General Information    Prior Level of Function independent:;ADL's;all household mobility  -BL     Barriers to Rehab ineffective coping  -BL     Row Name 22 1427          Living Environment    People in Home child(stoney), adult  -BL     Row Name 22 1427          Cognition    Orientation Status (Cognition) oriented x 4  -BL     Row Name 22 1427          Safety Issues, Functional Mobility    Impairments Affecting Function (Mobility) endurance/activity tolerance;shortness of breath;postural/trunk control  -BL           User Key  (r) = Recorded By, (t) = Taken By, (c) = Cosigned By    Initials Name Provider Type    BL Vicki Rayo OT Occupational Therapist                 Mobility/ADL's     Row Name 22 1428          Bed Mobility    Bed Mobility supine-sit  -BL     Supine-Sit Hyde (Bed Mobility) modified independence  -BL     Row Name  06/09/22 1428          Transfers    Sit-Stand Franklin (Transfers) contact guard  -BL     Row Name 06/09/22 1428          Sit-Stand Transfer    Assistive Device (Sit-Stand Transfers) walker, front-wheeled  -BL     Row Name 06/09/22 1428          Grooming Assessment/Training    Franklin Level (Grooming) contact guard assist  -BL     Position (Grooming) supported standing  -BL     Comment, (Grooming) 1 UE on sink for support  -BL     Row Name 06/09/22 1428          Lower Body Dressing Assessment/Training    Franklin Level (Lower Body Dressing) socks;don;supervision  -BL     Position (Lower Body Dressing) edge of bed sitting  -BL           User Key  (r) = Recorded By, (t) = Taken By, (c) = Cosigned By    Initials Name Provider Type    BL Vicki Rayo OT Occupational Therapist               Obj/Interventions     Row Name 06/09/22 1429          Sensory Assessment (Somatosensory)    Sensory Assessment (Somatosensory) sensation intact  -BL     Row Name 06/09/22 1429          Range of Motion Comprehensive    General Range of Motion bilateral upper extremity ROM WFL  -BL     Row Name 06/09/22 1429          Strength Comprehensive (MMT)    General Manual Muscle Testing (MMT) Assessment no strength deficits identified  -BL     Comment, General Manual Muscle Testing (MMT) Assessment BUE 5/5  -BL     Row Name 06/09/22 1429          Balance    Balance Assessment sitting static balance;sitting dynamic balance;standing static balance;standing dynamic balance  -BL     Static Sitting Balance independent  -BL     Dynamic Sitting Balance independent  -BL     Position, Sitting Balance sitting edge of bed  -BL     Static Standing Balance contact guard  -BL     Dynamic Standing Balance minimal assist  -BL     Position/Device Used, Standing Balance walker, front-wheeled  -BL           User Key  (r) = Recorded By, (t) = Taken By, (c) = Cosigned By    Initials Name Provider Type    BL Vicki Rayo OT Occupational  Therapist               Goals/Plan     Row Name 06/09/22 1430          Dressing Goal 1 (OT)    Activity/Device (Dressing Goal 1, OT) dressing skills, all  -BL     Surprise/Cues Needed (Dressing Goal 1, OT) supervision required  -BL     Time Frame (Dressing Goal 1, OT) 2 weeks  -BL     Row Name 06/09/22 1430          Toileting Goal 1 (OT)    Activity/Device (Toileting Goal 1, OT) toileting skills, all  -BL     Surprise Level/Cues Needed (Toileting Goal 1, OT) supervision required  -BL     Time Frame (Toileting Goal 1, OT) 2 weeks  -BL     Row Name 06/09/22 1430          Grooming Goal 1 (OT)    Activity/Device (Grooming Goal 1, OT) grooming skills, all  -BL     Surprise (Grooming Goal 1, OT) supervision required  -BL     Time Frame (Grooming Goal 1, OT) 2 weeks  -BL     Row Name 06/09/22 1430          Therapy Assessment/Plan (OT)    Planned Therapy Interventions (OT) activity tolerance training;BADL retraining;functional balance retraining;neuromuscular control/coordination retraining;passive ROM/stretching;transfer/mobility retraining;strengthening exercise;ROM/therapeutic exercise;occupation/activity based interventions;patient/caregiver education/training  -BL           User Key  (r) = Recorded By, (t) = Taken By, (c) = Cosigned By    Initials Name Provider Type    BL Vicki Rayo, OT Occupational Therapist               Clinical Impression     Row Name 06/09/22 1430          Pain Assessment    Pretreatment Pain Rating 0/10 - no pain  -BL     Posttreatment Pain Rating 0/10 - no pain  -BL     Row Name 06/09/22 1430          Plan of Care Review    Plan of Care Reviewed With patient  -BL     Outcome Evaluation 55 y/o female presenting to [facility] on 6/7 due to COPD exacerbation. Pt reports independent PLOF. At time of evaluation pt required CGA for transfers and grooming at sink. Pt min A for functional mobility seconary to needing A with RW. Pt far from functional baseline and will benefit from short IP  rehab stay to increase endurance and activity tolerance for safe d/c home.  -BL           User Key  (r) = Recorded By, (t) = Taken By, (c) = Cosigned By    Initials Name Provider Type    Vicki Grove OT Occupational Therapist               Outcome Measures     Row Name 06/09/22 0701          How much help from another person do you currently need...    Turning from your back to your side while in flat bed without using bedrails? 3  -ES     Moving from lying on back to sitting on the side of a flat bed without bedrails? 3  -ES     Moving to and from a bed to a chair (including a wheelchair)? 4  -ES     Standing up from a chair using your arms (e.g., wheelchair, bedside chair)? 4  -ES     Climbing 3-5 steps with a railing? 3  -ES     To walk in hospital room? 3  -ES     AM-PAC 6 Clicks Score (PT) 20  -ES     Highest level of mobility 6 --> Walked 10 steps or more  -ES           User Key  (r) = Recorded By, (t) = Taken By, (c) = Cosigned By    Initials Name Provider Type    Natalya Beltrán LPN Licensed Nurse                Occupational Therapy Education                 Title: PT OT SLP Therapies (In Progress)     Topic: Occupational Therapy (Done)     Point: ADL training (Done)     Description:   Instruct learner(s) on proper safety adaptation and remediation techniques during self care or transfers.   Instruct in proper use of assistive devices.              Learning Progress Summary           Patient Acceptance, E,TB, VU by  at 6/8/2022 1627                   Point: Home exercise program (Done)     Description:   Instruct learner(s) on appropriate technique for monitoring, assisting and/or progressing therapeutic exercises/activities.              Learning Progress Summary           Patient Acceptance, E,TB, VU by  at 6/8/2022 1627                   Point: Precautions (Done)     Description:   Instruct learner(s) on prescribed precautions during self-care and functional transfers.               Learning Progress Summary           Patient Acceptance, E,TB, VU by  at 6/8/2022 1627                   Point: Body mechanics (Done)     Description:   Instruct learner(s) on proper positioning and spine alignment during self-care, functional mobility activities and/or exercises.              Learning Progress Summary           Patient Acceptance, E,TB, VU by  at 6/8/2022 1627                               User Key     Initials Effective Dates Name Provider Type Discipline     04/13/21 -  Jessica Lyn OT Occupational Therapist OT              OT Recommendation and Plan  Planned Therapy Interventions (OT): activity tolerance training, BADL retraining, functional balance retraining, neuromuscular control/coordination retraining, passive ROM/stretching, transfer/mobility retraining, strengthening exercise, ROM/therapeutic exercise, occupation/activity based interventions, patient/caregiver education/training  Therapy Frequency (OT): 3 times/wk  Plan of Care Review  Plan of Care Reviewed With: patient  Outcome Evaluation: 57 y/o female presenting to [facility] on 6/7 due to COPD exacerbation. Pt reports independent PLOF. At time of evaluation pt required CGA for transfers and grooming at sink. Pt min A for functional mobility seconary to needing A with RW. Pt far from functional baseline and will benefit from short IP rehab stay to increase endurance and activity tolerance for safe d/c home.     Time Calculation:     Therapy Charges for Today     Code Description Service Date Service Provider Modifiers Qty    [id number] HC OT EVAL MOD COMPLEXITY 4 6/8/2022 Jessica Lyn OT GO 1               JESSICA LYN OT  6/9/2022

## 2022-06-09 NOTE — PROGRESS NOTES
LOS: 1 day   Patient Care Team:  Park Dubose MD as PCP - General (Family Medicine)        Subjective  - Sitting on bed , looking better    Interval History:     Patient Complaints: Less SOB with  activity , O2 need decrease to 3L NC    Review of Systems   Constitutional: Positive for activity change, appetite change and fatigue.   Eyes: Negative.    Respiratory: Positive for cough, chest tightness, shortness of breath and wheezing.    Cardiovascular: Negative.    Gastrointestinal: Positive for nausea.   Endocrine: Negative.    Genitourinary: Negative.    Musculoskeletal: Positive for back pain and neck pain.   Neurological: Positive for dizziness, weakness, light-headedness and headaches.   Psychiatric/Behavioral: The patient is nervous/anxious.         Objective     Vital Signs  Temp:  [97.6 °F (36.4 °C)-98.2 °F (36.8 °C)] 98.2 °F (36.8 °C)  Heart Rate:  [56-90] 72  Resp:  [17-22] 17  BP: (130-157)/(79-89) 157/89    Physical Exam:     General Appearance:    Alert, cooperative, in no acute distress    Ears:    Ears appear intact with no abnormalities noted   Throat:   No oral lesions, no thrush, oral mucosa moist   Neck:   No adenopathy, supple, trachea midline, no thyromegaly, no   carotid bruit, no JVD   Lungs:     wheezing to auscultation, respirations regular, even and             Unlabored     Heart:    Regular rhythm and normal rate, normal S1 and S2, no            murmur, no gallop, no rub, no click   Abdomen:     Normal bowel sounds, no masses, no organomegaly, soft        nontender, nondistended, no guarding, no rebound                tenderness   Extremities:   Moves all extremities well, no edema, no cyanosis, no             redness   Skin:   No bleeding, bruising or rash   Neurologic:   Cranial nerves 2 - 12 grossly intact, sensation intact, DTR       present and equal bilaterally        Results Review:    Lab Results (last 24 hours)     Procedure Component Value Units Date/Time     Respiratory Panel PCR w/COVID-19(SARS-CoV-2) [facility]DE/LOUIS/ZACK/PAD/[facility]Z In-House, NP Swab in UTM/VTM, 3-4 HR TAT - Swab, Nasopharynx [[id number]]  (Abnormal) Collected: 06/09/22 1114    Specimen: Swab from Nasopharynx Updated: 06/09/22 1354     ADENOVIRUS, PCR Not Detected     Coronavirus 229E Not Detected     Coronavirus HKU1 Not Detected     Coronavirus NL63 Not Detected     Coronavirus OC43 Not Detected     COVID19 Not Detected     Human Metapneumovirus Not Detected     Human Rhinovirus/Enterovirus Not Detected     Influenza A PCR Not Detected     Influenza B PCR Not Detected     Parainfluenza Virus 1 Not Detected     Parainfluenza Virus 2 Not Detected     Parainfluenza Virus 3 Detected     Parainfluenza Virus 4 Not Detected     RSV, PCR Not Detected     Bordetella pertussis pcr Not Detected     Bordetella parapertussis PCR Not Detected     Chlamydophila pneumoniae PCR Not Detected     Mycoplasma pneumo by PCR Not Detected    Narrative:      In the setting of a positive respiratory panel with a viral infection PLUS a negative procalcitonin without other underlying concern for bacterial infection, consider observing off antibiotics or discontinuation of antibiotics and continue supportive care. If the respiratory panel is positive for atypical bacterial infection (Bordetella pertussis, Chlamydophila pneumoniae, or Mycoplasma pneumoniae), consider antibiotic de-escalation to target atypical bacterial infection.    Blood Culture - Blood, Arm, Left [[id number]]  (Normal) Collected: 06/08/22 0348    Specimen: Blood from Arm, Left Updated: 06/09/22 0433     Blood Culture No growth at 24 hours    Comprehensive Metabolic Panel [[id number]]  (Abnormal) Collected: 06/09/22 0059    Specimen: Blood Updated: 06/09/22 0245     Glucose 174 mg/dL      BUN 27 mg/dL      Creatinine 0.69 mg/dL      Sodium 144 mmol/L      Potassium 3.8 mmol/L      Chloride 103 mmol/L      CO2 32.0 mmol/L      Calcium 8.5 mg/dL      Total Protein 5.5  g/dL      Albumin 3.30 g/dL      ALT (SGPT) 38 U/L      AST (SGOT) 42 U/L      Alkaline Phosphatase 69 U/L      Total Bilirubin <0.2 mg/dL      Globulin 2.2 gm/dL      A/G Ratio 1.5 g/dL      BUN/Creatinine Ratio 39.1     Anion Gap 9.0 mmol/L      eGFR 100.7 mL/min/1.73      Comment: National Kidney Foundation and American Society of Nephrology (ASN) Task Force recommended calculation based on the Chronic Kidney Disease Epidemiology Collaboration (CKD-EPI) equation refit without adjustment for race.       Narrative:      GFR Normal >60  Chronic Kidney Disease <60  Kidney Failure <15      CBC & Differential [[id number]]  (Abnormal) Collected: 06/09/22 0059    Specimen: Blood Updated: 06/09/22 0145    Narrative:      The following orders were created for panel order CBC & Differential.  Procedure                               Abnormality         Status                     ---------                               -----------         ------                     CBC Auto Differential[[id number]]        Abnormal            Final result                 Please view results for these tests on the individual orders.    CBC Auto Differential [[id number]]  (Abnormal) Collected: 06/09/22 0059    Specimen: Blood Updated: 06/09/22 0145     WBC 5.40 10*3/mm3      RBC 3.92 10*6/mm3      Hemoglobin 12.1 g/dL      Hematocrit 37.1 %      MCV 94.6 fL      MCH 30.7 pg      MCHC 32.5 g/dL      RDW 13.5 %      RDW-SD 45.1 fl      MPV 7.6 fL      Platelets 170 10*3/mm3      Neutrophil % 90.0 %      Lymphocyte % 5.5 %      Monocyte % 4.4 %      Eosinophil % 0.0 %      Basophil % 0.1 %      Neutrophils, Absolute 4.90 10*3/mm3      Lymphocytes, Absolute 0.30 10*3/mm3      Monocytes, Absolute 0.20 10*3/mm3      Eosinophils, Absolute 0.00 10*3/mm3      Basophils, Absolute 0.00 10*3/mm3      nRBC 0.1 /100 WBC     Blood Culture - Blood, Arm, Right [[id number]]  (Normal) Collected: 06/07/22 2101    Specimen: Blood from Arm, Right Updated: 06/08/22 2133      Blood Culture No growth at 24 hours    BNP [[id number]]  (Abnormal) Collected: 06/08/22 2106    Specimen: Blood Updated: 06/08/22 2132     proBNP 1,157.0 pg/mL     Narrative:      Among patients with dyspnea, NT-proBNP is highly sensitive for the detection of acute congestive heart failure. In addition NT-proBNP of <300 pg/ml effectively rules out acute congestive heart failure with 99% negative predictive value.    Results may be falsely decreased if patient taking Biotin.           Imaging Results (Last 24 Hours)     ** No results found for the last 24 hours. **           I reviewed the patient's other test results and agree with the interpretation    Medication Review:     Current Facility-Administered Medications:   •  ALPRAZolam (XANAX) tablet 0.5 mg, 0.5 mg, Oral, Q6H PRN, Park Dubose MD, 0.5 mg at 06/09/22 1520  •  aluminum-magnesium hydroxide-simethicone (MAALOX MAX) 400-400-40 MG/5ML suspension 15 mL, 15 mL, Oral, Q6H PRN, Park Dubose MD  •  budesonide (PULMICORT) nebulizer solution 0.5 mg, 0.5 mg, Nebulization, BID - RT, Park Dubose MD, 0.5 mg at 06/09/22 0629  •  calcium 500 mg vitamin D 5 mcg (200 UT) per tablet 1 tablet, 1 tablet, Oral, Daily, Park Dubose MD, 1 tablet at 06/09/22 0815  •  cholecalciferol (VITAMIN D3) tablet 1,000 Units, 1,000 Units, Oral, Daily, Park Dubose MD, 1,000 Units at 06/09/22 0815  •  docusate sodium (COLACE) capsule 100 mg, 100 mg, Oral, BID, Park Dubose MD, 100 mg at 06/09/22 0815  •  Enoxaparin Sodium (LOVENOX) syringe 40 mg, 40 mg, Subcutaneous, Daily, Park Dubose MD, 40 mg at 06/09/22 1520  •  famotidine (PEPCID) tablet 40 mg, 40 mg, Oral, Daily, Park Dubose MD, 40 mg at 06/09/22 0815  •  gabapentin (NEURONTIN) tablet 600 mg, 600 mg, Oral, TID, Park Dubose MD, 600 mg at 06/09/22 1520  •  hydrALAZINE (APRESOLINE) injection 10 mg, 10 mg, Intravenous, Q6H PRN,  Park Dubose MD, 10 mg at 06/08/22 0412  •  HYDROcodone-acetaminophen (NORCO)  MG per tablet 1 tablet, 1 tablet, Oral, Q6H PRN, Park Dubose MD, 1 tablet at 06/09/22 1520  •  ipratropium-albuterol (DUO-NEB) nebulizer solution 3 mL, 3 mL, Nebulization, Q6H PRN, Park Dubose MD  •  ipratropium-albuterol (DUO-NEB) nebulizer solution 3 mL, 3 mL, Nebulization, BID - RT, Park Dubose MD, 3 mL at 06/09/22 0625  •  methylPREDNISolone sodium succinate (SOLU-Medrol) injection 60 mg, 60 mg, Intravenous, Q8H, Park Dubose MD, 60 mg at 06/09/22 1109  •  metoprolol tartrate (LOPRESSOR) injection 5 mg, 5 mg, Intravenous, Q4H PRN, Park Dubose MD, 5 mg at 06/08/22 1211  •  nicotine (NICODERM CQ) 21 MG/24HR patch 1 patch, 1 patch, Transdermal, Q24H, Park Dubose MD, 1 patch at 06/09/22 1658  •  nitroglycerin (NITROSTAT) SL tablet 0.4 mg, 0.4 mg, Sublingual, Q5 Min PRN, Park Dubose MD  •  ondansetron (ZOFRAN) injection 4 mg, 4 mg, Intravenous, Q6H PRN, Park Dubose MD  •  piperacillin-tazobactam (ZOSYN) IVPB 3.375 g in 100 mL NS (CD), 3.375 g, Intravenous, Q8H, Park Dubose MD, Last Rate: 0 mL/hr at 06/09/22 1527, 3.375 g at 06/09/22 1700  •  sodium chloride 0.9 % flush 3 mL, 3 mL, Intravenous, Q12H, Park Dubose MD, 3 mL at 06/09/22 0816  •  sodium chloride 0.9 % flush 3-10 mL, 3-10 mL, Intravenous, PRN, Park Dubose MD    I have reviewed medication list.    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

## 2022-06-09 NOTE — THERAPY TREATMENT NOTE
Subjective: Pt agreeable to therapeutic plan of care.  covid has been ruled but pt is + for flu    Objective:     Bed mobility - CGA supine to sit  Transfers - CGA and with rolling walker  Ambulation - 16 feet 2 times CGA and with rolling walker  Pt needed a sitting rest period after 16 feet.      Vitals: WNL    Pain: 0 VAS  Education: Provided education on importance of mobility and skilled verbal / tactile cueing throughout intervention.     Assessment: Xin Nj presents with functional mobility impairments which indicate the need for skilled intervention. Pt with poor endurance that is slowly improving. Tolerating session today without incident. Will continue to follow and progress as tolerated.     Plan/Recommendations:   High Intensity Therapy recommended post-acute care. This is recommended as therapy feels the patient would require 5-6 days per week, 2-3 hours per day. At this time, inpatient rehabilitation (acute rehab) would be recommended.  Pt requires no DME at discharge.     Pt desires Inpatient Rehabilitation placement at discharge. Pt cooperative; agreeable to therapeutic recommendations and plan of care.     Basic Mobility 6-click:  Rollin = Total, A lot = 2, A little = 3; 4 = None  Supine>Sit:   1 = Total, A lot = 2, A little = 3; 4 = None   Sit>Stand with arms:  1 = Total, A lot = 2, A little = 3; 4 = None  Bed>Chair:   1 = Total, A lot = 2, A little = 3; 4 = None  Ambulate in room:  1 = Total, A lot = 2, A little = 3; 4 = None  3-5 Steps with railin = Total, A lot = 2, A little = 3; 4 = None  Score: 16    Post-Tx Position: Up in Chair and Call light and personal items within reachPPE: gloves, surgical mask, eyewear protection

## 2022-06-09 NOTE — PLAN OF CARE
Goal Outcome Evaluation:  Plan of Care Reviewed With: patient        Progress: improving  Outcome Evaluation: Patient has been resting well throughout the shift. Remains on 3L of oxygen and IV zosyn. Blood cultures pending. No new complaints at this time. Will continue to monitor.

## 2022-06-09 NOTE — PROGRESS NOTES
LOS: 1 day   Patient Care Team:  Park Dubose MD as PCP - General (Family Medicine)        Subjective  - Sitting on bed , very SOB, using abdominal muscles to breath    Interval History:     Patient Complaints: SOB with any activity     Review of Systems   Constitutional: Positive for activity change, appetite change and fatigue.   Eyes: Negative.    Respiratory: Positive for cough, chest tightness, shortness of breath and wheezing.    Cardiovascular: Negative.    Gastrointestinal: Positive for nausea.   Endocrine: Negative.    Genitourinary: Negative.    Musculoskeletal: Positive for back pain and neck pain.   Neurological: Positive for dizziness, weakness, light-headedness and headaches.   Psychiatric/Behavioral: The patient is nervous/anxious.         Objective     Vital Signs  Temp:  [97.5 °F (36.4 °C)-97.8 °F (36.6 °C)] 97.8 °F (36.6 °C)  Heart Rate:  [78-93] 90  Resp:  [18-22] 20  BP: (104-187)/() 104/64    Physical Exam:     General Appearance:    Alert, cooperative, in mild acute distress, using accessory muscle to breath   Ears:    Ears appear intact with no abnormalities noted   Throat:   No oral lesions, no thrush, oral mucosa moist   Neck:   No adenopathy, supple, trachea midline, no thyromegaly, no   carotid bruit, no JVD   Lungs:     wheezing to auscultation, respirations regular, even but            Mildly labored     Heart:    Regular rhythm and normal rate, normal S1 and S2, no            murmur, no gallop, no rub, no click   Abdomen:     Normal bowel sounds, no masses, no organomegaly, soft        nontender, nondistended, no guarding, no rebound                tenderness   Extremities:   Moves all extremities well, no edema, no cyanosis, no             redness   Skin:   No bleeding, bruising or rash   Neurologic:   Cranial nerves 2 - 12 grossly intact, sensation intact, DTR       present and equal bilaterally        Results Review:    Lab Results (last 24 hours)     Procedure  Component Value Units Date/Time    Comprehensive Metabolic Panel [[id number]]  (Abnormal) Collected: 06/08/22 0348    Specimen: Blood Updated: 06/08/22 0425     Glucose 120 mg/dL      BUN 15 mg/dL      Creatinine 0.42 mg/dL      Sodium 143 mmol/L      Potassium 3.9 mmol/L      Comment: Slight hemolysis detected by analyzer. Results may be affected.        Chloride 104 mmol/L      CO2 30.0 mmol/L      Calcium 8.5 mg/dL      Total Protein 5.8 g/dL      Albumin 3.10 g/dL      ALT (SGPT) 26 U/L      AST (SGOT) 42 U/L      Alkaline Phosphatase 74 U/L      Total Bilirubin 0.2 mg/dL      Globulin 2.7 gm/dL      A/G Ratio 1.1 g/dL      BUN/Creatinine Ratio 35.7     Anion Gap 9.0 mmol/L      eGFR 113.5 mL/min/1.73      Comment: National Kidney Foundation and American Society of Nephrology (ASN) Task Force recommended calculation based on the Chronic Kidney Disease Epidemiology Collaboration (CKD-EPI) equation refit without adjustment for race.       Narrative:      GFR Normal >60  Chronic Kidney Disease <60  Kidney Failure <15      Blood Culture - Blood, Arm, Left [[id number]] Collected: 06/08/22 0348    Specimen: Blood from Arm, Left Updated: 06/08/22 0421    CBC & Differential [[id number]]  (Abnormal) Collected: 06/08/22 0348    Specimen: Blood Updated: 06/08/22 0408    Narrative:      The following orders were created for panel order CBC & Differential.  Procedure                               Abnormality         Status                     ---------                               -----------         ------                     CBC Auto Differential[[id number]]        Abnormal            Final result                 Please view results for these tests on the individual orders.    CBC Auto Differential [[id number]]  (Abnormal) Collected: 06/08/22 0348    Specimen: Blood Updated: 06/08/22 0408     WBC 7.90 10*3/mm3      RBC 4.11 10*6/mm3      Hemoglobin 12.8 g/dL      Hematocrit 38.9 %      MCV 94.7 fL      MCH 31.0 pg      MCHC  32.8 g/dL      RDW 13.5 %      RDW-SD 45.9 fl      MPV 7.5 fL      Platelets 164 10*3/mm3      Neutrophil % 89.9 %      Lymphocyte % 5.4 %      Monocyte % 4.2 %      Eosinophil % 0.1 %      Basophil % 0.4 %      Neutrophils, Absolute 7.10 10*3/mm3      Lymphocytes, Absolute 0.40 10*3/mm3      Monocytes, Absolute 0.30 10*3/mm3      Eosinophils, Absolute 0.00 10*3/mm3      Basophils, Absolute 0.00 10*3/mm3      nRBC 0.1 /100 WBC     Comprehensive Metabolic Panel [[id number]]  (Abnormal) Collected: 06/07/22 2101    Specimen: Blood Updated: 06/07/22 2139     Glucose 140 mg/dL      BUN 13 mg/dL      Creatinine 0.46 mg/dL      Sodium 145 mmol/L      Potassium 3.7 mmol/L      Comment: Slight hemolysis detected by analyzer. Results may be affected.        Chloride 105 mmol/L      CO2 30.0 mmol/L      Calcium 8.8 mg/dL      Total Protein 6.4 g/dL      Albumin 3.70 g/dL      ALT (SGPT) 26 U/L      AST (SGOT) 46 U/L      Alkaline Phosphatase 83 U/L      Total Bilirubin 0.2 mg/dL      Globulin 2.7 gm/dL      A/G Ratio 1.4 g/dL      BUN/Creatinine Ratio 28.3     Anion Gap 10.0 mmol/L      eGFR 111.1 mL/min/1.73      Comment: National Kidney Foundation and American Society of Nephrology (ASN) Task Force recommended calculation based on the Chronic Kidney Disease Epidemiology Collaboration (CKD-EPI) equation refit without adjustment for race.       Narrative:      GFR Normal >60  Chronic Kidney Disease <60  Kidney Failure <15      Troponin [[id number]]  (Normal) Collected: 06/07/22 2101    Specimen: Blood Updated: 06/07/22 2139     Troponin T <0.010 ng/mL     Narrative:      Troponin T Reference Range:  <= 0.03 ng/mL-   Negative for AMI  >0.03 ng/mL-     Abnormal for myocardial necrosis.  Clinicians would have to utilize clinical acumen, EKG, Troponin and serial changes to determine if it is an Acute Myocardial Infarction or myocardial injury due to an underlying chronic condition.       Results may be falsely decreased if patient  taking Biotin.      Lactic Acid, Plasma [[id number]]  (Normal) Collected: 06/07/22 2101    Specimen: Blood Updated: 06/07/22 2137     Lactate 1.2 mmol/L     CBC & Differential [[id number]]  (Abnormal) Collected: 06/07/22 2101    Specimen: Blood Updated: 06/07/22 2120    Narrative:      The following orders were created for panel order CBC & Differential.  Procedure                               Abnormality         Status                     ---------                               -----------         ------                     CBC Auto Differential[[id number]]        Abnormal            Final result                 Please view results for these tests on the individual orders.    CBC Auto Differential [[id number]]  (Abnormal) Collected: 06/07/22 2101    Specimen: Blood Updated: 06/07/22 2120     WBC 9.50 10*3/mm3      RBC 4.38 10*6/mm3      Hemoglobin 13.5 g/dL      Hematocrit 41.4 %      MCV 94.3 fL      MCH 30.8 pg      MCHC 32.7 g/dL      RDW 13.9 %      RDW-SD 46.8 fl      MPV 7.3 fL      Platelets 195 10*3/mm3      Neutrophil % 94.7 %      Lymphocyte % 3.0 %      Monocyte % 2.3 %      Eosinophil % 0.0 %      Basophil % 0.0 %      Neutrophils, Absolute 9.00 10*3/mm3      Lymphocytes, Absolute 0.30 10*3/mm3      Monocytes, Absolute 0.20 10*3/mm3      Eosinophils, Absolute 0.00 10*3/mm3      Basophils, Absolute 0.00 10*3/mm3      nRBC 0.0 /100 WBC     Blood Culture - Blood, Arm, Right [[id number]] Collected: 06/07/22 2101    Specimen: Blood from Arm, Right Updated: 06/07/22 2117         Imaging Results (Last 24 Hours)     ** No results found for the last 24 hours. **           I reviewed the patient's other test results and agree with the interpretation    Medication Review:     Current Facility-Administered Medications:   •  ALPRAZolam (XANAX) tablet 0.5 mg, 0.5 mg, Oral, Q6H PRN, Pakr Dubose MD, 0.25 mg at 06/08/22 1742  •  aluminum-magnesium hydroxide-simethicone (MAALOX MAX) 400-400-40 MG/5ML suspension  15 mL, 15 mL, Oral, Q6H PRN, Park Dubose MD  •  budesonide (PULMICORT) nebulizer solution 0.5 mg, 0.5 mg, Nebulization, BID - RT, Park Dubose MD, 0.5 mg at 06/08/22 2032  •  calcium 500 mg vitamin D 5 mcg (200 UT) per tablet 1 tablet, 1 tablet, Oral, Daily, Park Dubose MD, 1 tablet at 06/08/22 0856  •  cholecalciferol (VITAMIN D3) tablet 1,000 Units, 1,000 Units, Oral, Daily, Park Dubose MD, 1,000 Units at 06/08/22 0857  •  docusate sodium (COLACE) capsule 100 mg, 100 mg, Oral, BID, Park Dubose MD, 100 mg at 06/08/22 2021  •  Enoxaparin Sodium (LOVENOX) syringe 40 mg, 40 mg, Subcutaneous, Daily, Park Dubose MD, 40 mg at 06/08/22 1735  •  famotidine (PEPCID) tablet 40 mg, 40 mg, Oral, Daily, Park Dubose MD, 40 mg at 06/08/22 0857  •  gabapentin (NEURONTIN) tablet 600 mg, 600 mg, Oral, TID, Park Dubose MD, 600 mg at 06/08/22 2021  •  hydrALAZINE (APRESOLINE) injection 10 mg, 10 mg, Intravenous, Q6H PRN, Park Dubose MD, 10 mg at 06/08/22 0412  •  HYDROcodone-acetaminophen (NORCO)  MG per tablet 1 tablet, 1 tablet, Oral, Q6H PRN, Park Dubose MD, 1 tablet at 06/08/22 1735  •  ipratropium-albuterol (DUO-NEB) nebulizer solution 3 mL, 3 mL, Nebulization, Q6H PRN, Park Dubose MD  •  ipratropium-albuterol (DUO-NEB) nebulizer solution 3 mL, 3 mL, Nebulization, BID - RT, Park Dubose MD, 3 mL at 06/08/22 2032  •  methylPREDNISolone sodium succinate (SOLU-Medrol) injection 60 mg, 60 mg, Intravenous, Q8H, Park Dubose MD, 60 mg at 06/08/22 1931  •  metoprolol tartrate (LOPRESSOR) injection 5 mg, 5 mg, Intravenous, Q4H PRN, Park Dubose MD, 5 mg at 06/08/22 1211  •  nicotine (NICODERM CQ) 14 MG/24HR patch 1 patch, 1 patch, Transdermal, Q24H, Park Dubose MD, 1 patch at 06/08/22 0857  •  nitroglycerin (NITROSTAT) SL tablet 0.4 mg, 0.4 mg, Sublingual, Q5  Min PRN, Park Dubose MD  •  ondansetron (ZOFRAN) injection 4 mg, 4 mg, Intravenous, Q6H PRN, Park Dubose MD  •  piperacillin-tazobactam (ZOSYN) IVPB 3.375 g in 100 mL NS (CD), 3.375 g, Intravenous, Q8H, Park Dubose MD, 3.375 g at 06/08/22 1735  •  sodium chloride 0.9 % flush 3 mL, 3 mL, Intravenous, Q12H, Park Dubose MD, 3 mL at 06/08/22 2021  •  sodium chloride 0.9 % flush 3-10 mL, 3-10 mL, Intravenous, PRN, Park Dubose MD    I have reviewed medication list.    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

## 2022-06-09 NOTE — PLAN OF CARE
Goal Outcome Evaluation:     Bed mobility - CGA supine to sit  Transfers - CGA and with rolling walker  Ambulation - 16 feet 2 times CGA and with rolling walker  Pt needed a sitting rest period after 16 feet.      High Intensity Therapy recommended post-acute care. This is recommended as therapy feels the patient would require 5-6 days per week, 2-3 hours per day. At this time, inpatient rehabilitation (acute rehab) would be recommended.  Pt requires no DME at discharge.     Pt desires Inpatient Rehabilitation placement at discharge. Pt cooperative; agreeable to therapeutic recommendations and plan of care.

## 2022-06-09 NOTE — PROGRESS NOTES
"PULMONARY CRITICAL CARE Progress  NOTE      PATIENT IDENTIFICATION:  Name: Xin Nj  MRN: [id number]  :  1965     Age: 56 y.o.  Sex: female    DATE OF Note:  2022   Referring Physician: Park Dubose MD                  Subjective:   Feeling a little better, down to 3 L O2,  no SOB, no chest or abd pain, no bowel or bladder issues     Objective:  tMax 24 hrs: Temp (24hrs), Av.9 °F (36.6 °C), Min:97.6 °F (36.4 °C), Max:98.2 °F (36.8 °C)      Vitals Ranges:   Temp:  [97.6 °F (36.4 °C)-98.2 °F (36.8 °C)] 98.2 °F (36.8 °C)  Heart Rate:  [56-90] 72  Resp:  [17-22] 17  BP: (130-157)/(79-89) 157/89    Intake and Output Last 3 Shifts:   I/O last 3 completed shifts:  In: 960 [P.O.:960]  Out: -     Exam:  /89 (BP Location: Left arm, Patient Position: Lying)   Pulse 72   Temp 98.2 °F (36.8 °C) (Oral)   Resp 17   Ht 172.7 cm (68\")   Wt 96.9 kg (213 lb 11.2 oz)   SpO2 98%   BMI 32.49 kg/m²     General Appearance:  Alert   HEENT:  Normocephalic, without obvious abnormality, Conjunctivae/corneas clear.  Normal external ear canals, nares normal, no drainage     Neck:  Supple, symmetrical, trachea midline. No JVD.  Lungs /Chest wall:   Bilateral basal rhonchi, respirations unlabored, symmetrical wall movement.     Heart:  Regular rate and rhythm, systolic murmur PMI left sternal border  Abdomen: Soft, nontender, no masses, no organomegaly.    Extremities: Trace edema, no clubbing or cyanosis        Medications:    Current Facility-Administered Medications:     ALPRAZolam (XANAX) tablet 0.5 mg, 0.5 mg, Oral, Q6H PRN, Park Dubose MD, 0.5 mg at 22 0822    aluminum-magnesium hydroxide-simethicone (MAALOX MAX) 400-400-40 MG/5ML suspension 15 mL, 15 mL, Oral, Q6H PRN, Park Dubose MD    budesonide (PULMICORT) nebulizer solution 0.5 mg, 0.5 mg, Nebulization, BID - RT, Park Dubose MD, 0.5 mg at 22 0629    calcium 500 mg vitamin D 5 mcg (200 UT) per " tablet 1 tablet, 1 tablet, Oral, Daily, Park Dubose MD, 1 tablet at 06/09/22 0815    cholecalciferol (VITAMIN D3) tablet 1,000 Units, 1,000 Units, Oral, Daily, Park Dubose MD, 1,000 Units at 06/09/22 0815    docusate sodium (COLACE) capsule 100 mg, 100 mg, Oral, BID, Park Dubose MD, 100 mg at 06/09/22 0815    Enoxaparin Sodium (LOVENOX) syringe 40 mg, 40 mg, Subcutaneous, Daily, Park Dubose MD, 40 mg at 06/08/22 1735    famotidine (PEPCID) tablet 40 mg, 40 mg, Oral, Daily, Park Dubose MD, 40 mg at 06/09/22 0815    gabapentin (NEURONTIN) tablet 600 mg, 600 mg, Oral, TID, Park Dubose MD, 600 mg at 06/09/22 0816    hydrALAZINE (APRESOLINE) injection 10 mg, 10 mg, Intravenous, Q6H PRN, Park Dubose MD, 10 mg at 06/08/22 0412    HYDROcodone-acetaminophen (NORCO)  MG per tablet 1 tablet, 1 tablet, Oral, Q6H PRN, Park Dubose MD, 1 tablet at 06/09/22 0822    ipratropium-albuterol (DUO-NEB) nebulizer solution 3 mL, 3 mL, Nebulization, Q6H PRN, Park Dubose MD    ipratropium-albuterol (DUO-NEB) nebulizer solution 3 mL, 3 mL, Nebulization, BID - RT, Park Dubose MD, 3 mL at 06/09/22 0625    methylPREDNISolone sodium succinate (SOLU-Medrol) injection 60 mg, 60 mg, Intravenous, Q8H, Park Dubose MD, 60 mg at 06/09/22 1109    metoprolol tartrate (LOPRESSOR) injection 5 mg, 5 mg, Intravenous, Q4H PRN, Park Dubose MD, 5 mg at 06/08/22 1211    nicotine (NICODERM CQ) 14 MG/24HR patch 1 patch, 1 patch, Transdermal, Q24H, Park Dubose MD, 1 patch at 06/09/22 0817    nitroglycerin (NITROSTAT) SL tablet 0.4 mg, 0.4 mg, Sublingual, Q5 Min PRN, Park Dubose MD    ondansetron (ZOFRAN) injection 4 mg, 4 mg, Intravenous, Q6H PRN, Park Dubose MD    piperacillin-tazobactam (ZOSYN) IVPB 3.375 g in 100 mL NS (CD), 3.375 g, Intravenous, Q8H, Park Dubose MD,  Last Rate: 0 mL/hr at 06/09/22 0817, 3.375 g at 06/09/22 1109    sodium chloride 0.9 % flush 3 mL, 3 mL, Intravenous, Q12H, Park Dubose MD, 3 mL at 06/09/22 0816    sodium chloride 0.9 % flush 3-10 mL, 3-10 mL, Intravenous, PRN, Park Dubose MD    Data Review:  All labs (24hrs):   Recent Results (from the past 24 hour(s))   BNP    Collection Time: 06/08/22  9:06 PM    Specimen: Blood   Result Value Ref Range    proBNP 1,157.0 (H) 0.0 - 900.0 pg/mL   Comprehensive Metabolic Panel    Collection Time: 06/09/22 12:59 AM    Specimen: Blood   Result Value Ref Range    Glucose 174 (H) 65 - 99 mg/dL    BUN 27 (H) 6 - 20 mg/dL    Creatinine 0.69 0.57 - 1.00 mg/dL    Sodium 144 136 - 145 mmol/L    Potassium 3.8 3.5 - 5.2 mmol/L    Chloride 103 98 - 107 mmol/L    CO2 32.0 (H) 22.0 - 29.0 mmol/L    Calcium 8.5 (L) 8.6 - 10.5 mg/dL    Total Protein 5.5 (L) 6.0 - 8.5 g/dL    Albumin 3.30 (L) 3.50 - 5.20 g/dL    ALT (SGPT) 38 (H) 1 - 33 U/L    AST (SGOT) 42 (H) 1 - 32 U/L    Alkaline Phosphatase 69 39 - 117 U/L    Total Bilirubin <0.2 0.0 - 1.2 mg/dL    Globulin 2.2 gm/dL    A/G Ratio 1.5 g/dL    BUN/Creatinine Ratio 39.1 (H) 7.0 - 25.0    Anion Gap 9.0 5.0 - 15.0 mmol/L    eGFR 100.7 >60.0 mL/min/1.73   CBC Auto Differential    Collection Time: 06/09/22 12:59 AM    Specimen: Blood   Result Value Ref Range    WBC 5.40 3.40 - 10.80 10*3/mm3    RBC 3.92 3.77 - 5.28 10*6/mm3    Hemoglobin 12.1 12.0 - 15.9 g/dL    Hematocrit 37.1 34.0 - 46.6 %    MCV 94.6 79.0 - 97.0 fL    MCH 30.7 26.6 - 33.0 pg    MCHC 32.5 31.5 - 35.7 g/dL    RDW 13.5 12.3 - 15.4 %    RDW-SD 45.1 37.0 - 54.0 fl    MPV 7.6 6.0 - 12.0 fL    Platelets 170 140 - 450 10*3/mm3    Neutrophil % 90.0 (H) 42.7 - 76.0 %    Lymphocyte % 5.5 (L) 19.6 - 45.3 %    Monocyte % 4.4 (L) 5.0 - 12.0 %    Eosinophil % 0.0 (L) 0.3 - 6.2 %    Basophil % 0.1 0.0 - 1.5 %    Neutrophils, Absolute 4.90 1.70 - 7.00 10*3/mm3    Lymphocytes, Absolute 0.30 (L) 0.70 - 3.10  10*3/mm3    Monocytes, Absolute 0.20 0.10 - 0.90 10*3/mm3    Eosinophils, Absolute 0.00 0.00 - 0.40 10*3/mm3    Basophils, Absolute 0.00 0.00 - 0.20 10*3/mm3    nRBC 0.1 0.0 - 0.2 /100 WBC   Respiratory Panel PCR w/COVID-19(SARS-CoV-2) [facility] In-House, NP Swab in UTM/VTM, 3-4 HR TAT - Swab, Nasopharynx    Collection Time: 06/09/22 11:14 AM    Specimen: Nasopharynx; Swab   Result Value Ref Range    ADENOVIRUS, PCR Not Detected Not Detected    Coronavirus 229E Not Detected Not Detected    Coronavirus HKU1 Not Detected Not Detected    Coronavirus NL63 Not Detected Not Detected    Coronavirus OC43 Not Detected Not Detected    COVID19 Not Detected Not Detected - Ref. Range    Human Metapneumovirus Not Detected Not Detected    Human Rhinovirus/Enterovirus Not Detected Not Detected    Influenza A PCR Not Detected Not Detected    Influenza B PCR Not Detected Not Detected    Parainfluenza Virus 1 Not Detected Not Detected    Parainfluenza Virus 2 Not Detected Not Detected    Parainfluenza Virus 3 Detected (A) Not Detected    Parainfluenza Virus 4 Not Detected Not Detected    RSV, PCR Not Detected Not Detected    Bordetella pertussis pcr Not Detected Not Detected    Bordetella parapertussis PCR Not Detected Not Detected    Chlamydophila pneumoniae PCR Not Detected Not Detected    Mycoplasma pneumo by PCR Not Detected Not Detected        Imaging:  Adult Transthoracic Echo Complete W/ Cont if Necessary Per Protocol  · Estimated left ventricular EF was in agreement with the calculated left   ventricular EF. Left ventricular ejection fraction appears to be 56 - 60%.  · Left ventricular diastolic function is consistent with (grade II w/high   LAP) pseudonormalization.  · The study is technically difficult for diagnosis.          ASSESSMENT:  Pneumonia  COPD  History of carcinosarcoma of the endometrium s/p robotic radical hysterectomy   Hx invasive right breat cancer        PLAN:  OOB daily and use IS  throughout the day  Antibiotics  Bronchodilator  Inhaled corticosteroids  IV steroids  Incentive spiroemter  Titrate O2 as tolerated to keep sats > 88%  Electrolytes/ glycemic control  DVT and GI prophylaxis    Discussed with Dr. Dakotah Senior, DILEEP   6/9/2022  14:04 EDT          I personally have examined  and interviewed the patient. I have reviewed the history, data, problems, assessment and plan with our NP.  Total Critical care time in direct medical management (   ) minutes, This time specifically excludes time spent performing procedures.    Jaswinder Claire MD   6/9/2022  23:41 EDT

## 2022-06-09 NOTE — CASE MANAGEMENT/SOCIAL WORK
Continued Stay Note  [facility]     Patient Name: Xin Nj  MRN: [id number]  Today's Date: 6/9/2022    Admit Date: 6/7/2022     Discharge Plan     Row Name 06/09/22 1431       Plan    Plan D/C Plan: Accepted at [facility] pending bed availability, will need precert.  PASSR per facility.    Patient/Family in Agreement with Plan yes    Plan Comments Met with patient and daughter at bedside to follow up with rehab recommendation and choices.  Choices 1. [facility], 2. [facility] 3. [facility].  4. [facility].  Added to in- basket and notified [facility] liaisonLydia.  Lydia later notified me that [facility] has accepted pending bed availability.  Will need precert.  Discussed planning to start precert 6/10.  Sent secure chat to PMD and LPN to update on placement.  Barriers to D/C: 3L O2, IV Zosyn.  IV steroids.            Met with patient in room wearing PPE: mask.    Maintained distance greater than six feet and spent less than 15 minutes in the room.    Cindy Haq RN     Office Phone [phone]  Office Cell [phone]    PNA with sepsis:  sob at rest with silent lungs.  will recommend a CT as the pt has significant SOB and the CXR is not that impressive  GNR PNA?    recommendations:  CT chest.   ayana Hayesn 4.5gm q6h

## 2022-06-09 NOTE — DISCHARGE PLACEMENT REQUEST
"Xin Méndez (56 y.o. Female)             Date of Birth   1965    Social Security Number       Address   [address]  [place] [address]    Home Phone   [phone]    MRN   [id number]       Buddhism   Unknown    Marital Status                               Admission Date   6/7/22    Admission Type   Urgent    Admitting Provider   Park Dubose MD    Attending Provider   Park Dubose MD    Department, Room/Bed   [facility] 3C MEDICAL INPATIENT, 382/1       Discharge Date       Discharge Disposition       Discharge Destination                               Attending Provider: Park Dubose MD    Allergies: No Known Allergies    Isolation: None   Infection: COVID (rule out) (06/09/22)   Code Status: CPR   Advance Care Planning Activity    Ht: 172.7 cm (68\")   Wt: 96.9 kg (213 lb 11.2 oz)    Admission Cmt: None   Principal Problem: None                Active Insurance as of 6/7/2022     Primary Coverage     Payor Plan Insurance Group Employer/Plan Group    ANTHEM MEDICARE REPLACEMENT ANTHEM MEDICARE ADVANTAGE [id number]     Payor Plan Address Payor Plan Phone Number Payor Plan Fax Number Effective Dates    [address] [phone]  1/1/2020 - None Entered    [facility] [id number]       Subscriber Name Subscriber Birth Date Member ID       XIN MÉNDEZ 1965 [id number]           Secondary Coverage     Payor Plan Insurance Group Employer/Plan Group    [place]      Payor Plan Address Payor Plan Phone Number Payor Plan Fax Number Effective Dates    [address]   12/1/2020 - None Entered    [place] [address]       Subscriber Name Subscriber Birth Date Member ID       XIN MÉNDEZ 1965 [id number]                 Emergency Contacts      (Rel.) Home Phone Work Phone Mobile Phone    Belle Méndez (Daughter) -- -- [phone]               History & Physical      Park Dubose MD at " 06/07/22 8790              Patient Care Team:  Park Dubose MD as PCP - General (Family Medicine)    Chief complaint SOB and cough    Subjective     Patient is a 56 y.o. female presents with increasing SOB and cough for several days, went to [facility], and admitted to [place] for Pneumonia and COPD Exacerbation, started treatment, but pt continued to get worse and the family did not like the care they received , so they requested to be transferred to [facility] under my care for further evaluation and treatment     Review of Systems   Constitutional: Positive for activity change and fatigue.   Eyes: Negative.    Respiratory: Positive for cough, shortness of breath and wheezing.    Cardiovascular: Positive for leg swelling.   Gastrointestinal: Positive for nausea.   Endocrine: Negative.    Genitourinary: Negative.    Musculoskeletal: Positive for back pain and neck pain.   Neurological: Positive for dizziness, light-headedness and headaches.   Psychiatric/Behavioral: The patient is nervous/anxious.            History  Past Medical History:   Diagnosis Date   • Anxiety    • COPD (chronic obstructive pulmonary disease) (CMS/HCC)    • COPD (chronic obstructive pulmonary disease) (CMS/HCC)    • Depression    • Encounter for antineoplastic radiation therapy    • Endometrial cancer (CMS/HCC)    • History of right breast cancer    • Osteoporosis      Past Surgical History:   Procedure Laterality Date   • BREAST LUMPECTOMY Right    • HYSTERECTOMY  2020     Family History   Problem Relation Age of Onset   • Stroke Mother    • Lung cancer Brother      Social History     Tobacco Use   • Smoking status: Current Every Day Smoker   • Smokeless tobacco: Never Used   • Tobacco comment: down to 1 ppd   Vaping Use   • Vaping Use: Never used   Substance Use Topics   • Alcohol use: Not Currently   • Drug use: Never     Medications Prior to Admission   Medication Sig Dispense Refill Last Dose   • albuterol sulfate  (90 Base)  MCG/ACT inhaler Inhale 2 puffs Every 4 (Four) Hours As Needed.      • budesonide-formoterol (SYMBICORT) 160-4.5 MCG/ACT inhaler Inhale 2 puffs 2 (Two) Times a Day.      • calcium carbonate-vitamin d 600-400 MG-UNIT per tablet Take 1 tablet by mouth Daily.      • cholecalciferol (VITAMIN D3) 25 MCG (1000 UT) tablet Take 1,000 Units by mouth Daily.      • gabapentin (NEURONTIN) 600 MG tablet Take 600 mg by mouth 3 (Three) Times a Day.      • HYDROcodone-acetaminophen (NORCO)  MG per tablet Take 1 tablet by mouth Every 6 (Six) Hours As Needed for Moderate Pain . 28 tablet 0    • ipratropium-albuterol (DUO-NEB) 0.5-2.5 mg/3 ml nebulizer Take 3 mL by nebulization Every 6 (Six) Hours As Needed for Wheezing.        Allergies:  Patient has no known allergies.    Objective     Vital Signs  Temp:  [97 °F (36.1 °C)-98.3 °F (36.8 °C)] 98.3 °F (36.8 °C)  Heart Rate:  [93] 93  Resp:  [20] 20  BP: (169-172)/(101-108) 172/108    Physical Exam:       General Appearance:    Alert, cooperative, in mild acute distress, using accessory muscles to breath   Eyes:            Lids and lashes normal, conjunctivae and sclerae normal, no   icterus, no pallor, corneas clear, PERRLA   Ears:    Ears appear intact with no abnormalities noted   Throat:   No oral lesions, no thrush, oral mucosa moist   Neck:   No adenopathy, supple, trachea midline, no thyromegaly, no   carotid bruit, no JVD   Lungs:     Wheezing and crackles to auscultation,respirations regular, even but mildly labored                   Heart:    Regular rhythm and normal rate, normal S1 and S2, no            murmur, no gallop, no rub, no click   Abdomen:     Normal bowel sounds, no masses, no organomegaly, soft        non-tender, non-distended, no guarding, no rebound                tenderness   Extremities:   Moves all extremities well, 1+ edema, no cyanosis, no             redness   Pulses:   Pulses palpable and equal bilaterally   Skin:   No bleeding, bruising or rash    Neurologic:   Cranial nerves 2 - 12 grossly intact, sensation intact, DTR       present and equal bilaterally       Results Review:  Lab Results (last 48 hours)     ** No results found for the last 48 hours. **          Imaging Results (Last 24 Hours)     Procedure Component Value Units Date/Time    XR Chest PA & Lateral [[id number]] Collected: 06/07/22 2006     Updated: 06/07/22 2008    Narrative:      DATE OF EXAM:  6/7/2022 6:59 PM     PROCEDURE:  XR CHEST PA AND LATERAL-     INDICATIONS:  Shortness of breath. History of tobacco abuse and COPD.       COMPARISON:  No Comparisons Available     TECHNIQUE:   Two radiologic views of the chest.     FINDINGS:  The heart size is normal. The pulmonary vascular markings are normal.  The lungs and pleural spaces are clear of active disease.  There is a  mild thoracic spondylosis. There is a left-sided port in place. The tip  terminates in the superior vena cava.       Impression:      No active disease.     Electronically Signed By-Khanh Gold MD On:6/7/2022 8:06 PM  This report was finalized on [id number] by  Khanh Gold MD.           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

## 2022-06-10 LAB
ALBUMIN SERPL-MCNC: 3.2 G/DL (ref 3.5–5.2)
ALBUMIN/GLOB SERPL: 1.3 G/DL
ALP SERPL-CCNC: 69 U/L (ref 39–117)
ALT SERPL W P-5'-P-CCNC: 40 U/L (ref 1–33)
ANION GAP SERPL CALCULATED.3IONS-SCNC: 7 MMOL/L (ref 5–15)
AST SERPL-CCNC: 28 U/L (ref 1–32)
BILIRUB SERPL-MCNC: <0.2 MG/DL (ref 0–1.2)
BUN SERPL-MCNC: 23 MG/DL (ref 6–20)
BUN/CREAT SERPL: 41.8 (ref 7–25)
CALCIUM SPEC-SCNC: 8.4 MG/DL (ref 8.6–10.5)
CHLORIDE SERPL-SCNC: 100 MMOL/L (ref 98–107)
CO2 SERPL-SCNC: 37 MMOL/L (ref 22–29)
CREAT SERPL-MCNC: 0.55 MG/DL (ref 0.57–1)
EGFRCR SERPLBLD CKD-EPI 2021: 106.4 ML/MIN/1.73
GLOBULIN UR ELPH-MCNC: 2.5 GM/DL
GLUCOSE SERPL-MCNC: 179 MG/DL (ref 65–99)
POTASSIUM SERPL-SCNC: 3.3 MMOL/L (ref 3.5–5.2)
PROT SERPL-MCNC: 5.7 G/DL (ref 6–8.5)
SODIUM SERPL-SCNC: 144 MMOL/L (ref 136–145)

## 2022-06-10 PROCEDURE — 25010000002 METHYLPREDNISOLONE PER 125 MG: Performed by: FAMILY MEDICINE

## 2022-06-10 PROCEDURE — 80053 COMPREHEN METABOLIC PANEL: CPT | Performed by: FAMILY MEDICINE

## 2022-06-10 PROCEDURE — 25010000002 PIPERACILLIN SOD-TAZOBACTAM PER 1 G: Performed by: FAMILY MEDICINE

## 2022-06-10 PROCEDURE — 25010000002 METHYLPREDNISOLONE PER 40 MG: Performed by: FAMILY MEDICINE

## 2022-06-10 PROCEDURE — 94799 UNLISTED PULMONARY SVC/PX: CPT

## 2022-06-10 PROCEDURE — 25010000002 FUROSEMIDE PER 20 MG: Performed by: FAMILY MEDICINE

## 2022-06-10 PROCEDURE — 97110 THERAPEUTIC EXERCISES: CPT

## 2022-06-10 PROCEDURE — 25010000002 ENOXAPARIN PER 10 MG: Performed by: FAMILY MEDICINE

## 2022-06-10 PROCEDURE — 97116 GAIT TRAINING THERAPY: CPT

## 2022-06-10 RX ORDER — POTASSIUM CHLORIDE 7.45 MG/ML
10 INJECTION INTRAVENOUS
Status: DISCONTINUED | OUTPATIENT
Start: 2022-06-10 | End: 2022-06-13 | Stop reason: HOSPADM

## 2022-06-10 RX ORDER — MAGNESIUM SULFATE HEPTAHYDRATE 40 MG/ML
4 INJECTION, SOLUTION INTRAVENOUS AS NEEDED
Status: DISCONTINUED | OUTPATIENT
Start: 2022-06-10 | End: 2022-06-13 | Stop reason: HOSPADM

## 2022-06-10 RX ORDER — MAGNESIUM SULFATE HEPTAHYDRATE 40 MG/ML
2 INJECTION, SOLUTION INTRAVENOUS AS NEEDED
Status: DISCONTINUED | OUTPATIENT
Start: 2022-06-10 | End: 2022-06-13 | Stop reason: HOSPADM

## 2022-06-10 RX ORDER — METHYLPREDNISOLONE SODIUM SUCCINATE 40 MG/ML
40 INJECTION, POWDER, LYOPHILIZED, FOR SOLUTION INTRAMUSCULAR; INTRAVENOUS EVERY 8 HOURS
Status: DISCONTINUED | OUTPATIENT
Start: 2022-06-10 | End: 2022-06-11

## 2022-06-10 RX ORDER — POTASSIUM CHLORIDE 20 MEQ/1
40 TABLET, EXTENDED RELEASE ORAL AS NEEDED
Status: DISCONTINUED | OUTPATIENT
Start: 2022-06-10 | End: 2022-06-13 | Stop reason: HOSPADM

## 2022-06-10 RX ORDER — POTASSIUM CHLORIDE 1.5 G/1.77G
40 POWDER, FOR SOLUTION ORAL AS NEEDED
Status: DISCONTINUED | OUTPATIENT
Start: 2022-06-10 | End: 2022-06-13 | Stop reason: HOSPADM

## 2022-06-10 RX ADMIN — ALPRAZOLAM 0.5 MG: 0.5 TABLET ORAL at 08:51

## 2022-06-10 RX ADMIN — NICOTINE 1 PATCH: 21 PATCH, EXTENDED RELEASE TRANSDERMAL at 08:51

## 2022-06-10 RX ADMIN — CALCIUM CARBONATE-VITAMIN D TAB 500 MG-200 UNIT 1 TABLET: 500-200 TAB at 08:47

## 2022-06-10 RX ADMIN — METHYLPREDNISOLONE SODIUM SUCCINATE 60 MG: 125 INJECTION, POWDER, FOR SOLUTION INTRAMUSCULAR; INTRAVENOUS at 02:20

## 2022-06-10 RX ADMIN — ENOXAPARIN SODIUM 40 MG: 100 INJECTION SUBCUTANEOUS at 14:57

## 2022-06-10 RX ADMIN — IPRATROPIUM BROMIDE AND ALBUTEROL SULFATE 3 ML: .5; 3 SOLUTION RESPIRATORY (INHALATION) at 19:35

## 2022-06-10 RX ADMIN — GABAPENTIN 600 MG: 600 TABLET, FILM COATED ORAL at 21:21

## 2022-06-10 RX ADMIN — BUDESONIDE 0.5 MG: 0.5 INHALANT RESPIRATORY (INHALATION) at 19:31

## 2022-06-10 RX ADMIN — PIPERACILLIN AND TAZOBACTAM 3.38 G: 3; .375 INJECTION, POWDER, LYOPHILIZED, FOR SOLUTION INTRAVENOUS at 02:17

## 2022-06-10 RX ADMIN — HYDROCODONE BITARTRATE AND ACETAMINOPHEN 1 TABLET: 10; 325 TABLET ORAL at 21:21

## 2022-06-10 RX ADMIN — PIPERACILLIN AND TAZOBACTAM 3.38 G: 3; .375 INJECTION, POWDER, LYOPHILIZED, FOR SOLUTION INTRAVENOUS at 17:30

## 2022-06-10 RX ADMIN — PIPERACILLIN AND TAZOBACTAM 3.38 G: 3; .375 INJECTION, POWDER, LYOPHILIZED, FOR SOLUTION INTRAVENOUS at 10:09

## 2022-06-10 RX ADMIN — Medication 1000 UNITS: at 08:47

## 2022-06-10 RX ADMIN — GABAPENTIN 600 MG: 600 TABLET, FILM COATED ORAL at 08:47

## 2022-06-10 RX ADMIN — FAMOTIDINE 40 MG: 20 TABLET ORAL at 08:47

## 2022-06-10 RX ADMIN — METHYLPREDNISOLONE SODIUM SUCCINATE 40 MG: 40 INJECTION, POWDER, FOR SOLUTION INTRAMUSCULAR; INTRAVENOUS at 21:21

## 2022-06-10 RX ADMIN — Medication 3 ML: at 08:47

## 2022-06-10 RX ADMIN — FUROSEMIDE 40 MG: 10 INJECTION, SOLUTION INTRAMUSCULAR; INTRAVENOUS at 08:47

## 2022-06-10 RX ADMIN — ALUMINA, MAGNESIA, AND SIMETHICONE 15 ML: 2400; 2400; 240 SUSPENSION ORAL at 23:29

## 2022-06-10 RX ADMIN — ALPRAZOLAM 0.5 MG: 0.5 TABLET ORAL at 21:21

## 2022-06-10 RX ADMIN — HYDROCODONE BITARTRATE AND ACETAMINOPHEN 1 TABLET: 10; 325 TABLET ORAL at 08:51

## 2022-06-10 RX ADMIN — Medication 3 ML: at 21:21

## 2022-06-10 RX ADMIN — HYDROCODONE BITARTRATE AND ACETAMINOPHEN 1 TABLET: 10; 325 TABLET ORAL at 14:57

## 2022-06-10 RX ADMIN — GABAPENTIN 600 MG: 600 TABLET, FILM COATED ORAL at 14:57

## 2022-06-10 RX ADMIN — METHYLPREDNISOLONE SODIUM SUCCINATE 60 MG: 125 INJECTION, POWDER, FOR SOLUTION INTRAMUSCULAR; INTRAVENOUS at 10:56

## 2022-06-10 NOTE — THERAPY TREATMENT NOTE
Subjective: Pt agreeable to therapeutic plan of care.     Objective:     Bed mobility - Supervision  Transfers - CGA  Ambulation - 50 feet + 40 feet CGA w/ RW    Vitals: WNL    Pain: 0 VAS  Education: Provided education on importance of mobility and skilled verbal / tactile cueing throughout intervention.     Assessment: Xin Nj presents with functional mobility impairments which indicate the need for skilled intervention. Pt displayed ability to ambulate ~50'+40'  requiring RW/CGA with verbal cuing for proximity to RW and increasing CLEM. Pt required one extended seated rest break due to decreased activity tolerance. Pt completed 10 reps of seated ther ex. Pt continues to display decreased strength and activity tolerance. Pt expressed LLE felt numb during ambulation due to sciatica. Tolerating session today without incident. Will continue to follow and progress as tolerated.     Plan/Recommendations:   This PTA recommends Skilled Nursing.      Pt desires Inpatient Rehabilitation placement at discharge. Pt cooperative; agreeable to therapeutic recommendations and plan of care.         Basic Mobility 6-click:  Rollin = Total, A lot = 2, A little = 3; 4 = None  Supine>Sit:   1 = Total, A lot = 2, A little = 3; 4 = None   Sit>Stand with arms:  1 = Total, A lot = 2, A little = 3; 4 = None  Bed>Chair:   1 = Total, A lot = 2, A little = 3; 4 = None  Ambulate in room:  1 = Total, A lot = 2, A little = 3; 4 = None  3-5 Steps with railin = Total, A lot = 2, A little = 3; 4 = None  Score: 18      Post-Tx Position: Call light and personal items within reach Pt seated EOB with daughter present  PPE: gloves, surgical mask, eyewear protection

## 2022-06-10 NOTE — PROGRESS NOTES
"Nutrition Services    Patient Name: Xin Nj  YOB: 1965  MRN: [id number]  Admission date: 6/7/2022    Comment:      PPE Documentation        PPE Worn By Provider N/A, did not enter pt's room this date   PPE Worn By Patient  N/A     CLINICAL NUTRITION ASSESSMENT      Reason for Assessment 6/10: MST 2     H&P      Past Medical History:   Diagnosis Date   • Anxiety    • COPD (chronic obstructive pulmonary disease) (CMS/HCC)    • COPD (chronic obstructive pulmonary disease) (CMS/HCC)    • Depression    • Encounter for antineoplastic radiation therapy    • Endometrial cancer (CMS/HCC)    • History of right breast cancer    • Osteoporosis        Past Surgical History:   Procedure Laterality Date   • BREAST LUMPECTOMY Right    • HYSTERECTOMY  2020        Current Problems   Acute bronchitis 2/2 parainfluenza    Acute respiratory failure    COD    Pulmonary edema    CHF    HTN    THEODORE    Hx endometrial cancer and R breast cancer    Chronic pain    RAOUL       Encounter Information        Trending Narrative     6/10: Pt unavailable at time of visit, chart reviewed.     Anthropometrics        Current Height, Weight Height: 172.7 cm (68\")  Weight: 96.9 kg (213 lb 11.2 oz) (06/09/22 0452)       Ideal Body Weight (IBW) 140lb   Usual Body Weight (UBW) FARHEEN       Trending Weight Hx     This admission: 6/10: current weight up 8lb since admit             PTA: 6/10: current weight stable x 1.5 years    Wt Readings from Last 30 Encounters:   06/09/22 0452 96.9 kg (213 lb 11.2 oz)   06/08/22 0827 97.5 kg (215 lb)   06/08/22 0757 97.5 kg (215 lb)   06/08/22 0340 97.6 kg (215 lb 2.7 oz)   06/07/22 1701 93 kg (205 lb)   06/07/22 1520 93 kg (205 lb)   11/12/21 1140 102 kg (225 lb)   07/13/21 1357 98.9 kg (218 lb)   07/07/21 1110 99.9 kg (220 lb 3.2 oz)   06/30/21 1349 98 kg (216 lb)   06/29/21 1350 98.5 kg (217 lb 3.2 oz)   06/23/21 1319 98.4 kg (217 lb)   06/16/21 1410 99.8 kg (220 lb)   06/09/21 1410 98.9 kg (218 lb) "   06/08/21 1342 98.5 kg (217 lb 3.2 oz)   05/19/21 1342 101 kg (222 lb 3.2 oz)   04/30/21 0841 98.3 kg (216 lb 12.8 oz)   03/31/21 1010 98.4 kg (217 lb)   03/18/21 0937 98.4 kg (216 lb 14.4 oz)   03/05/21 1300 98.4 kg (217 lb)   02/23/21 1053 97.8 kg (215 lb 8 oz)   02/23/21 1538 97.5 kg (215 lb)   02/04/21 1135 92.1 kg (203 lb)   01/26/21 1456 94.9 kg (209 lb 4.8 oz)   01/22/21 1319 96.6 kg (213 lb)   01/22/21 1216 96.9 kg (213 lb 9.6 oz)      BMI kg/m2 Body mass index is 32.49 kg/m².       Labs        Pertinent Labs    Results from last 7 days   Lab Units 06/10/22  0439 06/09/22  0059 06/08/22  0348   SODIUM mmol/L 144 144 143   POTASSIUM mmol/L 3.3* 3.8 3.9   CHLORIDE mmol/L 100 103 104   CO2 mmol/L 37.0* 32.0* 30.0*   BUN mg/dL 23* 27* 15   CREATININE mg/dL 0.55* 0.69 0.42*   CALCIUM mg/dL 8.4* 8.5* 8.5*   BILIRUBIN mg/dL <0.2 <0.2 0.2   ALK PHOS U/L 69 69 74   ALT (SGPT) U/L 40* 38* 26   AST (SGOT) U/L 28 42* 42*   GLUCOSE mg/dL 179* 174* 120*     Results from last 7 days   Lab Units 06/09/22  0059   HEMOGLOBIN g/dL 12.1   HEMATOCRIT % 37.1     COVID19   Date Value Ref Range Status   06/09/2022 Not Detected Not Detected - Ref. Range Final     No results found for: HGBA1C     Medications    Scheduled Medications budesonide, 0.5 mg, Nebulization, BID - RT  calcium 500 mg vitamin D 5 mcg (200 UT), 1 tablet, Oral, Daily  cholecalciferol, 1,000 Units, Oral, Daily  docusate sodium, 100 mg, Oral, BID  enoxaparin, 40 mg, Subcutaneous, Daily  furosemide, 40 mg, Intravenous, Daily  gabapentin, 600 mg, Oral, TID  ipratropium-albuterol, 3 mL, Nebulization, BID - RT  methylPREDNISolone sodium succinate, 60 mg, Intravenous, Q8H  nicotine, 1 patch, Transdermal, Q24H  piperacillin-tazobactam, 3.375 g, Intravenous, Q8H  sodium chloride, 3 mL, Intravenous, Q12H        Infusions      PRN Medications •  ALPRAZolam  •  aluminum-magnesium hydroxide-simethicone  •  hydrALAZINE  •  HYDROcodone-acetaminophen  •   ipratropium-albuterol  •  metoprolol tartrate  •  nitroglycerin  •  ondansetron  •  sodium chloride     Physical Findings        Trending Physical   Appearance, NFPE 6/10: Unable to assess in person this date   --  Edema  none     Bowel Function BM 6/7   Tubes none   Chewing/Swallowing No issues   Skin No breakdown   --  Current Nutrition Orders & Evaluation of Intake       Oral Nutrition     Food Allergies NKFA   Current PO Diet Diet Regular   Supplement    PO Evaluation     Trending % PO Intake 6/10: 92% last 6 meals   --  Nutritional Risk Screening        NRS-2002 Score          Nutrition Diagnosis         Nutrition Dx Problem 1 No nutrition diagnosis at present time      Nutrition Dx Problem 2        Intervention Goal         Intervention Goal(s) Meal intake maintained above 75% of meals     Nutrition Intervention        RD Action No intervention     Nutrition Prescription          Diet Prescription Regular   Supplement Prescription    --  Monitor/Evaluation        Monitor PO intake, Pertinent labs, Weight, Skin status, GI status         Electronically signed by:  Анна Becerra RD  06/10/22 14:07 EDT

## 2022-06-10 NOTE — PLAN OF CARE
Goal Outcome Evaluation:          Xin Nj presents with functional mobility impairments which indicate the need for skilled intervention. Pt displayed ability to ambulate ~50'+40'  requiring RW/CGA with verbal cuing for proximity to RW and increasing CLEM. Pt required one extended seated rest break due to decreased activity tolerance. Pt completed 10 reps of seated ther ex. Pt continues to display decreased strength and activity tolerance. Pt expressed LLE felt numb during ambulation due to sciatica. Tolerating session today without incident. Will continue to follow and progress as tolerated.

## 2022-06-10 NOTE — PROGRESS NOTES
"PULMONARY CRITICAL CARE Progress  NOTE      PATIENT IDENTIFICATION:  Name: Xin Nj  MRN: [id number]  :  1965     Age: 56 y.o.  Sex: female    DATE OF Note:  6/10/2022   Referring Physician: Park Dubose MD                  Subjective:   Feeling  better, currently on 4 L O2,  Still wheezing, some coughing, no SOB, no chest or abd pain, no bowel or bladder issues     Objective:  tMax 24 hrs: Temp (24hrs), Av °F (36.7 °C), Min:97.8 °F (36.6 °C), Max:98.2 °F (36.8 °C)      Vitals Ranges:   Temp:  [97.8 °F (36.6 °C)-98.2 °F (36.8 °C)] 97.8 °F (36.6 °C)  Heart Rate:  [60-92] 60  Resp:  [16-18] 16  BP: (126-157)/(73-89) 141/79    Intake and Output Last 3 Shifts:   I/O last 3 completed shifts:  In: 2740 [P.O.:2640; IV Piggyback:100]  Out: 1150 [Urine:1150]    Exam:  /79 (BP Location: Right arm, Patient Position: Lying)   Pulse 60   Temp 97.8 °F (36.6 °C) (Oral)   Resp 16   Ht 172.7 cm (68\")   Wt 96.9 kg (213 lb 11.2 oz)   SpO2 98%   BMI 32.49 kg/m²     General Appearance:  Alert   HEENT:  Normocephalic, without obvious abnormality, Conjunctivae/corneas clear.  Normal external ear canals, nares normal, no drainage     Neck:  Supple, symmetrical, trachea midline. No JVD.  Lungs /Chest wall:   Bilateral basal rhonchi, wheezing, respirations unlabored, symmetrical wall movement.     Heart:  Regular rate and rhythm, systolic murmur PMI left sternal border  Abdomen: Soft, nontender, no masses, no organomegaly.    Extremities: Trace edema, no clubbing or cyanosis        Medications:    Current Facility-Administered Medications:   •  ALPRAZolam (XANAX) tablet 0.5 mg, 0.5 mg, Oral, Q6H PRN, Park Dubose MD, 0.5 mg at 06/10/22 0851  •  aluminum-magnesium hydroxide-simethicone (MAALOX MAX) 400-400-40 MG/5ML suspension 15 mL, 15 mL, Oral, Q6H PRN, Park Dubose MD  •  budesonide (PULMICORT) nebulizer solution 0.5 mg, 0.5 mg, Nebulization, BID - RT, Gracy, " MD Park, 0.5 mg at 06/09/22 1938  •  calcium 500 mg vitamin D 5 mcg (200 UT) per tablet 1 tablet, 1 tablet, Oral, Daily, Park Dubose MD, 1 tablet at 06/10/22 0847  •  cholecalciferol (VITAMIN D3) tablet 1,000 Units, 1,000 Units, Oral, Daily, Park Dubose MD, 1,000 Units at 06/10/22 0847  •  docusate sodium (COLACE) capsule 100 mg, 100 mg, Oral, BID, Park Dubose MD, 100 mg at 06/09/22 2021  •  Enoxaparin Sodium (LOVENOX) syringe 40 mg, 40 mg, Subcutaneous, Daily, Park Dubose MD, 40 mg at 06/09/22 1520  •  furosemide (LASIX) injection 40 mg, 40 mg, Intravenous, Daily, Park Dubose MD, 40 mg at 06/10/22 0847  •  gabapentin (NEURONTIN) tablet 600 mg, 600 mg, Oral, TID, Park Dubose MD, 600 mg at 06/10/22 0847  •  hydrALAZINE (APRESOLINE) injection 10 mg, 10 mg, Intravenous, Q6H PRN, Park Dubose MD, 10 mg at 06/08/22 0412  •  HYDROcodone-acetaminophen (NORCO)  MG per tablet 1 tablet, 1 tablet, Oral, Q6H PRN, Park Dubose MD, 1 tablet at 06/10/22 0851  •  ipratropium-albuterol (DUO-NEB) nebulizer solution 3 mL, 3 mL, Nebulization, Q6H PRN, Park Dubose MD  •  ipratropium-albuterol (DUO-NEB) nebulizer solution 3 mL, 3 mL, Nebulization, BID - RT, Park Dubose MD, 3 mL at 06/09/22 1930  •  methylPREDNISolone sodium succinate (SOLU-Medrol) injection 60 mg, 60 mg, Intravenous, Q8H, Park Dubose MD, 60 mg at 06/10/22 1056  •  metoprolol tartrate (LOPRESSOR) injection 5 mg, 5 mg, Intravenous, Q4H PRN, Park Dubose MD, 5 mg at 06/08/22 1211  •  nicotine (NICODERM CQ) 21 MG/24HR patch 1 patch, 1 patch, Transdermal, Q24H, Park Dubose MD, 1 patch at 06/10/22 0851  •  nitroglycerin (NITROSTAT) SL tablet 0.4 mg, 0.4 mg, Sublingual, Q5 Min PRN, Park Dubose MD  •  ondansetron (ZOFRAN) injection 4 mg, 4 mg, Intravenous, Q6H PRN, Park Dubose MD  •   piperacillin-tazobactam (ZOSYN) IVPB 3.375 g in 100 mL NS (CD), 3.375 g, Intravenous, Q8H, Park Dubose MD, Last Rate: 0 mL/hr at 06/09/22 1527, 3.375 g at 06/10/22 1009  •  sodium chloride 0.9 % flush 3 mL, 3 mL, Intravenous, Q12H, Park Dubose MD, 3 mL at 06/10/22 0847  •  sodium chloride 0.9 % flush 3-10 mL, 3-10 mL, Intravenous, PRN, Park Dubose MD    Data Review:  All labs (24hrs):   Recent Results (from the past 24 hour(s))   Comprehensive Metabolic Panel    Collection Time: 06/10/22  4:39 AM    Specimen: Blood   Result Value Ref Range    Glucose 179 (H) 65 - 99 mg/dL    BUN 23 (H) 6 - 20 mg/dL    Creatinine 0.55 (L) 0.57 - 1.00 mg/dL    Sodium 144 136 - 145 mmol/L    Potassium 3.3 (L) 3.5 - 5.2 mmol/L    Chloride 100 98 - 107 mmol/L    CO2 37.0 (H) 22.0 - 29.0 mmol/L    Calcium 8.4 (L) 8.6 - 10.5 mg/dL    Total Protein 5.7 (L) 6.0 - 8.5 g/dL    Albumin 3.20 (L) 3.50 - 5.20 g/dL    ALT (SGPT) 40 (H) 1 - 33 U/L    AST (SGOT) 28 1 - 32 U/L    Alkaline Phosphatase 69 39 - 117 U/L    Total Bilirubin <0.2 0.0 - 1.2 mg/dL    Globulin 2.5 gm/dL    A/G Ratio 1.3 g/dL    BUN/Creatinine Ratio 41.8 (H) 7.0 - 25.0    Anion Gap 7.0 5.0 - 15.0 mmol/L    eGFR 106.4 >60.0 mL/min/1.73        Imaging:  Adult Transthoracic Echo Complete W/ Cont if Necessary Per Protocol  · Estimated left ventricular EF was in agreement with the calculated left   ventricular EF. Left ventricular ejection fraction appears to be 56 - 60%.  · Left ventricular diastolic function is consistent with (grade II w/high   LAP) pseudonormalization.  · The study is technically difficult for diagnosis.          ASSESSMENT:  Pneumonia  COPD  History of carcinosarcoma of the endometrium s/p robotic radical hysterectomy   Hx invasive right breat cancer        PLAN:  Ambulate in room and stanley more   OOB daily and use IS throughout the day  Antibiotics  Bronchodilator  Inhaled corticosteroids  IV steroids-wean slowly   Incentive  spiroemter  Titrate O2 as tolerated to keep sats > 88%  Electrolytes/ glycemic control  DVT and GI prophylaxis    Discussed with Dr. Dakotah Senior, APRN   6/10/2022  11:37 EDT          I personally have examined  and interviewed the patient. I have reviewed the history, data, problems, assessment and plan with our NP.  Critical care time in direct medical management (   ) minutes  Electronically signed by Jaswinder Claire MD. D, ABSM.

## 2022-06-10 NOTE — CASE MANAGEMENT/SOCIAL WORK
Continued Stay Note  TODD Gorman     Patient Name: Xin Nj  MRN: [id number]  Today's Date: 6/10/2022    Admit Date: 6/7/2022     Discharge Plan     Row Name 06/10/22 1520       Plan    Plan D/C Plan: Accepted at [facility] pending bed availability.  Precert started 6/10. PASSR per facility.    Patient/Family in Agreement with Plan yes    Plan Comments Spoke with Lydia from [facility].  Precert started 6/10.  Updated RN and patient.  Barriers to D/C: IV antibiotics, IV steroids.            Met with patient in room wearing PPE: mask, gloves, gown.    Maintained distance greater than six feet and spent less than 15 minutes in the room.    Cindy Haq RN      Office Phone [phone]  Office Cell [phone]

## 2022-06-11 LAB
ALBUMIN SERPL-MCNC: 3.2 G/DL (ref 3.5–5.2)
ALBUMIN/GLOB SERPL: 1.4 G/DL
ALP SERPL-CCNC: 61 U/L (ref 39–117)
ALT SERPL W P-5'-P-CCNC: 39 U/L (ref 1–33)
ANION GAP SERPL CALCULATED.3IONS-SCNC: 8 MMOL/L (ref 5–15)
AST SERPL-CCNC: 23 U/L (ref 1–32)
BILIRUB SERPL-MCNC: <0.2 MG/DL (ref 0–1.2)
BUN SERPL-MCNC: 24 MG/DL (ref 6–20)
BUN/CREAT SERPL: 40 (ref 7–25)
CALCIUM SPEC-SCNC: 8.4 MG/DL (ref 8.6–10.5)
CHLORIDE SERPL-SCNC: 98 MMOL/L (ref 98–107)
CO2 SERPL-SCNC: 38 MMOL/L (ref 22–29)
CREAT SERPL-MCNC: 0.6 MG/DL (ref 0.57–1)
EGFRCR SERPLBLD CKD-EPI 2021: 104.2 ML/MIN/1.73
GLOBULIN UR ELPH-MCNC: 2.3 GM/DL
GLUCOSE SERPL-MCNC: 172 MG/DL (ref 65–99)
MAGNESIUM SERPL-MCNC: 2.1 MG/DL (ref 1.6–2.6)
NT-PROBNP SERPL-MCNC: 264.6 PG/ML (ref 0–900)
POTASSIUM SERPL-SCNC: 3.4 MMOL/L (ref 3.5–5.2)
PROT SERPL-MCNC: 5.5 G/DL (ref 6–8.5)
SODIUM SERPL-SCNC: 144 MMOL/L (ref 136–145)

## 2022-06-11 PROCEDURE — 25010000002 PIPERACILLIN SOD-TAZOBACTAM PER 1 G: Performed by: INTERNAL MEDICINE

## 2022-06-11 PROCEDURE — 63710000001 PREDNISONE PER 5 MG: Performed by: NURSE PRACTITIONER

## 2022-06-11 PROCEDURE — 25010000002 PIPERACILLIN SOD-TAZOBACTAM PER 1 G: Performed by: FAMILY MEDICINE

## 2022-06-11 PROCEDURE — 94760 N-INVAS EAR/PLS OXIMETRY 1: CPT

## 2022-06-11 PROCEDURE — 83880 ASSAY OF NATRIURETIC PEPTIDE: CPT | Performed by: FAMILY MEDICINE

## 2022-06-11 PROCEDURE — 25010000002 FUROSEMIDE PER 20 MG: Performed by: NURSE PRACTITIONER

## 2022-06-11 PROCEDURE — 83735 ASSAY OF MAGNESIUM: CPT | Performed by: FAMILY MEDICINE

## 2022-06-11 PROCEDURE — 94799 UNLISTED PULMONARY SVC/PX: CPT

## 2022-06-11 PROCEDURE — 94664 DEMO&/EVAL PT USE INHALER: CPT

## 2022-06-11 PROCEDURE — 80053 COMPREHEN METABOLIC PANEL: CPT | Performed by: FAMILY MEDICINE

## 2022-06-11 PROCEDURE — 63710000001 PREDNISONE PER 1 MG: Performed by: NURSE PRACTITIONER

## 2022-06-11 PROCEDURE — 25010000002 ENOXAPARIN PER 10 MG: Performed by: FAMILY MEDICINE

## 2022-06-11 PROCEDURE — 25010000002 METHYLPREDNISOLONE PER 40 MG: Performed by: FAMILY MEDICINE

## 2022-06-11 RX ORDER — PREDNISONE 20 MG/1
20 TABLET ORAL DAILY
Status: DISCONTINUED | OUTPATIENT
Start: 2022-06-13 | End: 2022-06-13 | Stop reason: HOSPADM

## 2022-06-11 RX ORDER — PREDNISONE 10 MG/1
10 TABLET ORAL DAILY
Status: DISCONTINUED | OUTPATIENT
Start: 2022-06-15 | End: 2022-06-13 | Stop reason: HOSPADM

## 2022-06-11 RX ADMIN — Medication 1000 UNITS: at 07:49

## 2022-06-11 RX ADMIN — ALPRAZOLAM 0.5 MG: 0.5 TABLET ORAL at 20:47

## 2022-06-11 RX ADMIN — CALCIUM CARBONATE-VITAMIN D TAB 500 MG-200 UNIT 1 TABLET: 500-200 TAB at 07:49

## 2022-06-11 RX ADMIN — IPRATROPIUM BROMIDE AND ALBUTEROL SULFATE 3 ML: .5; 3 SOLUTION RESPIRATORY (INHALATION) at 07:36

## 2022-06-11 RX ADMIN — PIPERACILLIN AND TAZOBACTAM 3.38 G: 3; .375 INJECTION, POWDER, LYOPHILIZED, FOR SOLUTION INTRAVENOUS at 18:35

## 2022-06-11 RX ADMIN — GABAPENTIN 600 MG: 600 TABLET, FILM COATED ORAL at 16:22

## 2022-06-11 RX ADMIN — IPRATROPIUM BROMIDE AND ALBUTEROL SULFATE 3 ML: .5; 3 SOLUTION RESPIRATORY (INHALATION) at 00:25

## 2022-06-11 RX ADMIN — PIPERACILLIN AND TAZOBACTAM 3.38 G: 3; .375 INJECTION, POWDER, LYOPHILIZED, FOR SOLUTION INTRAVENOUS at 09:42

## 2022-06-11 RX ADMIN — Medication 3 ML: at 07:49

## 2022-06-11 RX ADMIN — IPRATROPIUM BROMIDE AND ALBUTEROL SULFATE 3 ML: .5; 3 SOLUTION RESPIRATORY (INHALATION) at 17:33

## 2022-06-11 RX ADMIN — HYDROCODONE BITARTRATE AND ACETAMINOPHEN 1 TABLET: 10; 325 TABLET ORAL at 23:06

## 2022-06-11 RX ADMIN — HYDROCODONE BITARTRATE AND ACETAMINOPHEN 1 TABLET: 10; 325 TABLET ORAL at 09:47

## 2022-06-11 RX ADMIN — IPRATROPIUM BROMIDE AND ALBUTEROL SULFATE 3 ML: .5; 3 SOLUTION RESPIRATORY (INHALATION) at 19:37

## 2022-06-11 RX ADMIN — NICOTINE 1 PATCH: 21 PATCH, EXTENDED RELEASE TRANSDERMAL at 07:52

## 2022-06-11 RX ADMIN — GABAPENTIN 600 MG: 600 TABLET, FILM COATED ORAL at 07:49

## 2022-06-11 RX ADMIN — BUDESONIDE 0.5 MG: 0.5 INHALANT RESPIRATORY (INHALATION) at 07:36

## 2022-06-11 RX ADMIN — PREDNISONE 30 MG: 20 TABLET ORAL at 16:22

## 2022-06-11 RX ADMIN — METHYLPREDNISOLONE SODIUM SUCCINATE 40 MG: 40 INJECTION, POWDER, FOR SOLUTION INTRAMUSCULAR; INTRAVENOUS at 05:51

## 2022-06-11 RX ADMIN — PIPERACILLIN AND TAZOBACTAM 3.38 G: 3; .375 INJECTION, POWDER, LYOPHILIZED, FOR SOLUTION INTRAVENOUS at 02:04

## 2022-06-11 RX ADMIN — FUROSEMIDE 40 MG: 10 INJECTION, SOLUTION INTRAMUSCULAR; INTRAVENOUS at 07:49

## 2022-06-11 RX ADMIN — HYDROCODONE BITARTRATE AND ACETAMINOPHEN 1 TABLET: 10; 325 TABLET ORAL at 03:30

## 2022-06-11 RX ADMIN — HYDROCODONE BITARTRATE AND ACETAMINOPHEN 1 TABLET: 10; 325 TABLET ORAL at 16:22

## 2022-06-11 RX ADMIN — BUDESONIDE 0.5 MG: 0.5 INHALANT RESPIRATORY (INHALATION) at 19:41

## 2022-06-11 RX ADMIN — ENOXAPARIN SODIUM 40 MG: 100 INJECTION SUBCUTANEOUS at 16:22

## 2022-06-11 RX ADMIN — GABAPENTIN 600 MG: 600 TABLET, FILM COATED ORAL at 20:47

## 2022-06-11 NOTE — PROGRESS NOTES
"PULMONARY CRITICAL CARE Progress  NOTE      PATIENT IDENTIFICATION:  Name: Xin Nj  MRN: [id number]  :  1965     Age: 56 y.o.  Sex: female    DATE OF Note:  2022   Referring Physician: Park Dubose MD                  Subjective:   Feeling  better, remains on 4 L O2, still some wheezing and still coughing some, no chest or abd pain, no bowel or bladder issues     Objective:  tMax 24 hrs: Temp (24hrs), Av.8 °F (36.6 °C), Min:97.6 °F (36.4 °C), Max:98.1 °F (36.7 °C)      Vitals Ranges:   Temp:  [97.6 °F (36.4 °C)-98.1 °F (36.7 °C)] 97.6 °F (36.4 °C)  Heart Rate:  [70-94] 78  Resp:  [18-20] 18  BP: (134-153)/(83-87) 134/83    Intake and Output Last 3 Shifts:   I/O last 3 completed shifts:  In: 2900 [P.O.:2700; IV Piggyback:200]  Out: 1550 [Urine:1550]    Exam:  /83 (BP Location: Right arm, Patient Position: Lying)   Pulse 78   Temp 97.6 °F (36.4 °C) (Oral)   Resp 18   Ht 172.7 cm (68\")   Wt 96.8 kg (213 lb 4.8 oz)   SpO2 100%   BMI 32.43 kg/m²     General Appearance:  Alert   HEENT:  Normocephalic, without obvious abnormality, Conjunctivae/corneas clear.  Normal external ear canals, nares normal, no drainage     Neck:  Supple, symmetrical, trachea midline. No JVD.  Lungs /Chest wall:   Bilateral basal rhonchi, wheezing, respirations unlabored, symmetrical wall movement.     Heart:  Regular rate and rhythm, systolic murmur PMI left sternal border  Abdomen: Soft, nontender, no masses, no organomegaly.    Extremities: Trace edema, no clubbing or cyanosis        Medications:    Current Facility-Administered Medications:   •  ALPRAZolam (XANAX) tablet 0.5 mg, 0.5 mg, Oral, Q6H PRN, Park Dubose MD, 0.5 mg at 06/10/22 2121  •  aluminum-magnesium hydroxide-simethicone (MAALOX MAX) 400-400-40 MG/5ML suspension 15 mL, 15 mL, Oral, Q6H PRN, Park Dubose MD, 15 mL at 06/10/22 2989  •  budesonide (PULMICORT) nebulizer solution 0.5 mg, 0.5 mg, Nebulization, BID " - RT, Park Dubose MD, 0.5 mg at 06/11/22 0736  •  calcium 500 mg vitamin D 5 mcg (200 UT) per tablet 1 tablet, 1 tablet, Oral, Daily, Park Dubose MD, 1 tablet at 06/11/22 0749  •  cholecalciferol (VITAMIN D3) tablet 1,000 Units, 1,000 Units, Oral, Daily, Park Dubose MD, 1,000 Units at 06/11/22 0749  •  docusate sodium (COLACE) capsule 100 mg, 100 mg, Oral, BID, Park Dubose MD, 100 mg at 06/09/22 2021  •  Enoxaparin Sodium (LOVENOX) syringe 40 mg, 40 mg, Subcutaneous, Daily, Park Dubose MD, 40 mg at 06/10/22 1457  •  furosemide (LASIX) injection 40 mg, 40 mg, Intravenous, Daily, Fang Senior APRN, 40 mg at 06/11/22 0749  •  gabapentin (NEURONTIN) tablet 600 mg, 600 mg, Oral, TID, Park Dubose MD, 600 mg at 06/11/22 0749  •  hydrALAZINE (APRESOLINE) injection 10 mg, 10 mg, Intravenous, Q6H PRN, Park Dubose MD, 10 mg at 06/08/22 0412  •  HYDROcodone-acetaminophen (NORCO)  MG per tablet 1 tablet, 1 tablet, Oral, Q6H PRN, Park Dubose MD, 1 tablet at 06/11/22 0330  •  ipratropium-albuterol (DUO-NEB) nebulizer solution 3 mL, 3 mL, Nebulization, Q6H PRN, Park Dubose MD, 3 mL at 06/11/22 0025  •  ipratropium-albuterol (DUO-NEB) nebulizer solution 3 mL, 3 mL, Nebulization, BID - RT, Park Dubose MD, 3 mL at 06/11/22 0736  •  Magnesium Sulfate 2 gram Bolus, followed by 8 gram infusion (total Mg dose 10 grams)- Mg less than or equal to 1mg/dL, 2 g, Intravenous, PRN **OR** Magnesium Sulfate 2 gram / 50mL Infusion (GIVE X 3 BAGS TO EQUAL 6GM TOTAL DOSE) - Mg 1.1 - 1.5 mg/dl, 2 g, Intravenous, PRN **OR** Magnesium Sulfate 4 gram infusion- Mg 1.6-1.9 mg/dL, 4 g, Intravenous, PRN, Park Dubose MD  •  methylPREDNISolone sodium succinate (SOLU-Medrol) injection 40 mg, 40 mg, Intravenous, Q8H, Park Dubose MD, 40 mg at 06/11/22 0551  •  metoprolol tartrate (LOPRESSOR) injection 5 mg, 5  mg, Intravenous, Q4H PRN, Park Dubose MD, 5 mg at 06/08/22 1211  •  nicotine (NICODERM CQ) 21 MG/24HR patch 1 patch, 1 patch, Transdermal, Q24H, Park Dubose MD, 1 patch at 06/11/22 0752  •  nitroglycerin (NITROSTAT) SL tablet 0.4 mg, 0.4 mg, Sublingual, Q5 Min PRN, Park Dubose MD  •  ondansetron (ZOFRAN) injection 4 mg, 4 mg, Intravenous, Q6H PRN, Park Dubose MD  •  piperacillin-tazobactam (ZOSYN) IVPB 3.375 g in 100 mL NS (CD), 3.375 g, Intravenous, Q8H, Park Dubose MD, Last Rate: 0 mL/hr at 06/09/22 1527, 3.375 g at 06/11/22 0204  •  potassium chloride (K-DUR,KLOR-CON) CR tablet 40 mEq, 40 mEq, Oral, PRN **OR** potassium chloride (KLOR-CON) packet 40 mEq, 40 mEq, Oral, PRN **OR** potassium chloride 10 mEq in 100 mL IVPB, 10 mEq, Intravenous, Q1H PRN, Park Dubose MD  •  sodium chloride 0.9 % flush 3 mL, 3 mL, Intravenous, Q12H, Park Dubose MD, 3 mL at 06/11/22 0749  •  sodium chloride 0.9 % flush 3-10 mL, 3-10 mL, Intravenous, PRN, Park Dubose MD    Data Review:  All labs (24hrs):   Recent Results (from the past 24 hour(s))   BNP    Collection Time: 06/11/22  2:38 AM    Specimen: Blood   Result Value Ref Range    proBNP 264.6 0.0 - 900.0 pg/mL   Magnesium    Collection Time: 06/11/22  2:38 AM    Specimen: Blood   Result Value Ref Range    Magnesium 2.1 1.6 - 2.6 mg/dL   Comprehensive Metabolic Panel    Collection Time: 06/11/22  2:38 AM    Specimen: Blood   Result Value Ref Range    Glucose 172 (H) 65 - 99 mg/dL    BUN 24 (H) 6 - 20 mg/dL    Creatinine 0.60 0.57 - 1.00 mg/dL    Sodium 144 136 - 145 mmol/L    Potassium 3.4 (L) 3.5 - 5.2 mmol/L    Chloride 98 98 - 107 mmol/L    CO2 38.0 (H) 22.0 - 29.0 mmol/L    Calcium 8.4 (L) 8.6 - 10.5 mg/dL    Total Protein 5.5 (L) 6.0 - 8.5 g/dL    Albumin 3.20 (L) 3.50 - 5.20 g/dL    ALT (SGPT) 39 (H) 1 - 33 U/L    AST (SGOT) 23 1 - 32 U/L    Alkaline Phosphatase 61 39 - 117 U/L     Total Bilirubin <0.2 0.0 - 1.2 mg/dL    Globulin 2.3 gm/dL    A/G Ratio 1.4 g/dL    BUN/Creatinine Ratio 40.0 (H) 7.0 - 25.0    Anion Gap 8.0 5.0 - 15.0 mmol/L    eGFR 104.2 >60.0 mL/min/1.73        Imaging:  Adult Transthoracic Echo Complete W/ Cont if Necessary Per Protocol  · Estimated left ventricular EF was in agreement with the calculated left   ventricular EF. Left ventricular ejection fraction appears to be 56 - 60%.  · Left ventricular diastolic function is consistent with (grade II w/high   LAP) pseudonormalization.  · The study is technically difficult for diagnosis.          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 Dakotah Senior, APRN   6/11/2022  09:31 EDT          I personally have examined  and interviewed the patient. I have reviewed the history, data, problems, assessment and plan with our NP.  Critical care time in direct medical management (   ) minutes  Electronically signed by Jaswinder Claire MD. D, ABSM.

## 2022-06-11 NOTE — PLAN OF CARE
Goal Outcome Evaluation:  Plan of Care Reviewed With: patient        Progress: improving  Outcome Evaluation: Patient rested on and off. Daughter remained at bedside over night. Remains on 4 liters nasal cannula. Does get SOA with exertion.

## 2022-06-11 NOTE — PLAN OF CARE
Goal Outcome Evaluation:  Plan of Care Reviewed With: patient        Progress: improving  Outcome Evaluation: Patient resting in bed throughout shift. Remains on oxygen. daughter at bedside. patient encouraged to ambulate. complaints of pain treated per MAR.

## 2022-06-11 NOTE — PROGRESS NOTES
LOS: 1 day   Patient Care Team:  Park Dubose MD as PCP - General (Family Medicine)        Subjective  - Lying on bed , looking better    Interval History:     Patient Complaints: Less SOB with  activity , O2 need decrease to 3L NC    Review of Systems   Constitutional: Positive for activity change, appetite change and fatigue.   Eyes: Negative.    Respiratory: Positive for cough, shortness of breath and wheezing. Negative for chest tightness.    Cardiovascular: Negative.    Gastrointestinal: Positive for nausea.   Endocrine: Negative.    Genitourinary: Negative.    Musculoskeletal: Positive for back pain and neck pain.   Neurological: Positive for dizziness, weakness, light-headedness and headaches.   Psychiatric/Behavioral: The patient is nervous/anxious.         Objective     Vital Signs  Temp:  [97.8 °F (36.6 °C)-98.1 °F (36.7 °C)] 98.1 °F (36.7 °C)  Heart Rate:  [60-94] 94  Resp:  [16-20] 18  BP: (141-153)/(79-86) 153/86    Physical Exam:     General Appearance:    Alert, cooperative, in no acute distress    Ears:    Ears appear intact with no abnormalities noted   Throat:   No oral lesions, no thrush, oral mucosa moist   Neck:   No adenopathy, supple, trachea midline, no thyromegaly, no   carotid bruit, no JVD   Lungs:     wheezing to auscultation, respirations regular, even and             Unlabored     Heart:    Regular rhythm and normal rate, normal S1 and S2, no            murmur, no gallop, no rub, no click   Abdomen:     Normal bowel sounds, no masses, no organomegaly, soft        nontender, nondistended, no guarding, no rebound                tenderness   Extremities:   Moves all extremities well, no edema, no cyanosis, no             redness   Skin:   No bleeding, bruising or rash   Neurologic:   Cranial nerves 2 - 12 grossly intact, sensation intact, DTR       present and equal bilaterally        Results Review:    Lab Results (last 24 hours)     Procedure Component Value Units  Date/Time    Comprehensive Metabolic Panel [[id number]]  (Abnormal) Collected: 06/10/22 0439    Specimen: Blood Updated: 06/10/22 0606     Glucose 179 mg/dL      BUN 23 mg/dL      Creatinine 0.55 mg/dL      Sodium 144 mmol/L      Potassium 3.3 mmol/L      Chloride 100 mmol/L      CO2 37.0 mmol/L      Calcium 8.4 mg/dL      Total Protein 5.7 g/dL      Albumin 3.20 g/dL      ALT (SGPT) 40 U/L      AST (SGOT) 28 U/L      Alkaline Phosphatase 69 U/L      Total Bilirubin <0.2 mg/dL      Globulin 2.5 gm/dL      A/G Ratio 1.3 g/dL      BUN/Creatinine Ratio 41.8     Anion Gap 7.0 mmol/L      eGFR 106.4 mL/min/1.73      Comment: National Kidney Foundation and American Society of Nephrology (ASN) Task Force recommended calculation based on the Chronic Kidney Disease Epidemiology Collaboration (CKD-EPI) equation refit without adjustment for race.       Narrative:      GFR Normal >60  Chronic Kidney Disease <60  Kidney Failure <15      Blood Culture - Blood, Arm, Left [[id number]]  (Normal) Collected: 06/08/22 0348    Specimen: Blood from Arm, Left Updated: 06/10/22 0431     Blood Culture No growth at 2 days    Blood Culture - Blood, Arm, Right [[id number]]  (Normal) Collected: 06/07/22 2101    Specimen: Blood from Arm, Right Updated: 06/09/22 2132     Blood Culture No growth at 2 days         Imaging Results (Last 24 Hours)     ** No results found for the last 24 hours. **           I reviewed the patient's other test results and agree with the interpretation    Medication Review:     Current Facility-Administered Medications:   •  ALPRAZolam (XANAX) tablet 0.5 mg, 0.5 mg, Oral, Q6H PRN, Park Dubose MD, 0.5 mg at 06/10/22 0851  •  aluminum-magnesium hydroxide-simethicone (MAALOX MAX) 400-400-40 MG/5ML suspension 15 mL, 15 mL, Oral, Q6H PRN, Park Dubose MD  •  budesonide (PULMICORT) nebulizer solution 0.5 mg, 0.5 mg, Nebulization, BID - RT, Park Dubose MD, 0.5 mg at 06/10/22 1931  •  calcium 500  mg vitamin D 5 mcg (200 UT) per tablet 1 tablet, 1 tablet, Oral, Daily, Park Dubose MD, 1 tablet at 06/10/22 0847  •  cholecalciferol (VITAMIN D3) tablet 1,000 Units, 1,000 Units, Oral, Daily, Park Dubose MD, 1,000 Units at 06/10/22 0847  •  docusate sodium (COLACE) capsule 100 mg, 100 mg, Oral, BID, Park Dubose MD, 100 mg at 06/09/22 2021  •  Enoxaparin Sodium (LOVENOX) syringe 40 mg, 40 mg, Subcutaneous, Daily, Park Dubose MD, 40 mg at 06/10/22 1457  •  furosemide (LASIX) injection 40 mg, 40 mg, Intravenous, Daily, Fang Senior APRN, 40 mg at 06/10/22 0847  •  gabapentin (NEURONTIN) tablet 600 mg, 600 mg, Oral, TID, Park Dubose MD, 600 mg at 06/10/22 1457  •  hydrALAZINE (APRESOLINE) injection 10 mg, 10 mg, Intravenous, Q6H PRN, Park Dubose MD, 10 mg at 06/08/22 0412  •  HYDROcodone-acetaminophen (NORCO)  MG per tablet 1 tablet, 1 tablet, Oral, Q6H PRN, Park Dubose MD, 1 tablet at 06/10/22 1457  •  ipratropium-albuterol (DUO-NEB) nebulizer solution 3 mL, 3 mL, Nebulization, Q6H PRN, Park Dubose MD  •  ipratropium-albuterol (DUO-NEB) nebulizer solution 3 mL, 3 mL, Nebulization, BID - RT, Park Dubose MD, 3 mL at 06/10/22 1935  •  [START ON 6/11/2022] methylPREDNISolone sodium succinate (SOLU-Medrol) injection 40 mg, 40 mg, Intravenous, Q8H, Park Dubose MD  •  metoprolol tartrate (LOPRESSOR) injection 5 mg, 5 mg, Intravenous, Q4H PRN, Park Dubose MD, 5 mg at 06/08/22 1211  •  nicotine (NICODERM CQ) 21 MG/24HR patch 1 patch, 1 patch, Transdermal, Q24H, Park Dubose MD, 1 patch at 06/10/22 0851  •  nitroglycerin (NITROSTAT) SL tablet 0.4 mg, 0.4 mg, Sublingual, Q5 Min PRN, Park Dubose MD  •  ondansetron (ZOFRAN) injection 4 mg, 4 mg, Intravenous, Q6H PRN, Park Dubose MD  •  piperacillin-tazobactam (ZOSYN) IVPB 3.375 g in 100 mL NS (CD), 3.375 g,  Intravenous, Q8H, Park Dubose MD, Last Rate: 0 mL/hr at 06/09/22 1527, 3.375 g at 06/10/22 1730  •  sodium chloride 0.9 % flush 3 mL, 3 mL, Intravenous, Q12H, Park Dubose MD, 3 mL at 06/10/22 0847  •  sodium chloride 0.9 % flush 3-10 mL, 3-10 mL, Intravenous, PRN, Park Dubose MD    I have reviewed medication list.    Assessment & Plan     Active Problems:    Acute Bronchitis related to Parainfluenza  - IV Abx   Acute respiratory failure - Improving , wean off O2 and Pulmonary consulted   COPD Exacerbation with severe broncho spasm -  Wean off IV Steroid, nebs and O2 PRN, Pulmonary consulted   Pulmonary Edema with elevated BNP -  2D ECHO WNL   Acute diastolic CHF - IV diuretics, recheck BNP, good UOP   Hypokalemia - Potassium supplement    Uncontrolled HTN  - Better, PRN IV Hydralazine    THEODORE - Refused sleep study   Hx of carcinosarcoma of the endometrium and Invasive right breast cancer   Tobacco abuse - counseled to quit smoking    Chronic Pain - Pain control   Generalized anxiety disorder    Generalized weakness - PT recommended inpatient  Rehab     Stress ulcer/DVT prophylaxis           Plan for disposition: wants to go home at discharge      Park Dubose MD  06/10/22  21:02 EDT

## 2022-06-11 NOTE — NURSING NOTE
"Dietary aide came to me to ask if I knew where the patient was because she still needed to order dinner. I went to round on the patient and she was not in the room. Her heart monitor was still showing her rhythm on the monitor. Security was called and notified the patient needed to come back to the floor. Two aides were sent to the parking lot to see if the patient was down there, and she was found on a bench outside of the main entrance to the hospital, \"watching the birds.\"  She stated that she was told by her doctor to get more exercise, and that she wasn't aware that she couldn't leave the floor. Patient educated on need for her to stay on the unit due to patient safety concerns. Patient in agreeance.  "

## 2022-06-12 LAB
ALBUMIN SERPL-MCNC: 3.4 G/DL (ref 3.5–5.2)
ALBUMIN/GLOB SERPL: 1.6 G/DL
ALP SERPL-CCNC: 68 U/L (ref 39–117)
ALT SERPL W P-5'-P-CCNC: 41 U/L (ref 1–33)
ANION GAP SERPL CALCULATED.3IONS-SCNC: 13 MMOL/L (ref 5–15)
AST SERPL-CCNC: 21 U/L (ref 1–32)
BACTERIA SPEC AEROBE CULT: NORMAL
BILIRUB SERPL-MCNC: <0.2 MG/DL (ref 0–1.2)
BUN SERPL-MCNC: 25 MG/DL (ref 6–20)
BUN/CREAT SERPL: 34.2 (ref 7–25)
CALCIUM SPEC-SCNC: 8.7 MG/DL (ref 8.6–10.5)
CHLORIDE SERPL-SCNC: 94 MMOL/L (ref 98–107)
CO2 SERPL-SCNC: 33 MMOL/L (ref 22–29)
CREAT SERPL-MCNC: 0.73 MG/DL (ref 0.57–1)
EGFRCR SERPLBLD CKD-EPI 2021: 95.5 ML/MIN/1.73
GLOBULIN UR ELPH-MCNC: 2.1 GM/DL
GLUCOSE SERPL-MCNC: 283 MG/DL (ref 65–99)
MAGNESIUM SERPL-MCNC: 1.9 MG/DL (ref 1.6–2.6)
POTASSIUM SERPL-SCNC: 3.1 MMOL/L (ref 3.5–5.2)
POTASSIUM SERPL-SCNC: 4 MMOL/L (ref 3.5–5.2)
PROT SERPL-MCNC: 5.5 G/DL (ref 6–8.5)
SODIUM SERPL-SCNC: 140 MMOL/L (ref 136–145)

## 2022-06-12 PROCEDURE — 94760 N-INVAS EAR/PLS OXIMETRY 1: CPT

## 2022-06-12 PROCEDURE — 80053 COMPREHEN METABOLIC PANEL: CPT | Performed by: FAMILY MEDICINE

## 2022-06-12 PROCEDURE — 25010000002 FUROSEMIDE PER 20 MG: Performed by: NURSE PRACTITIONER

## 2022-06-12 PROCEDURE — 94799 UNLISTED PULMONARY SVC/PX: CPT

## 2022-06-12 PROCEDURE — 83735 ASSAY OF MAGNESIUM: CPT | Performed by: FAMILY MEDICINE

## 2022-06-12 PROCEDURE — 97530 THERAPEUTIC ACTIVITIES: CPT

## 2022-06-12 PROCEDURE — 63710000001 PREDNISONE PER 5 MG: Performed by: NURSE PRACTITIONER

## 2022-06-12 PROCEDURE — 94618 PULMONARY STRESS TESTING: CPT

## 2022-06-12 PROCEDURE — 63710000001 PREDNISONE PER 1 MG: Performed by: NURSE PRACTITIONER

## 2022-06-12 PROCEDURE — 84132 ASSAY OF SERUM POTASSIUM: CPT | Performed by: FAMILY MEDICINE

## 2022-06-12 PROCEDURE — 94664 DEMO&/EVAL PT USE INHALER: CPT

## 2022-06-12 PROCEDURE — 25010000002 PIPERACILLIN SOD-TAZOBACTAM PER 1 G: Performed by: INTERNAL MEDICINE

## 2022-06-12 RX ADMIN — POTASSIUM CHLORIDE 40 MEQ: 1500 TABLET, EXTENDED RELEASE ORAL at 09:42

## 2022-06-12 RX ADMIN — Medication 3 ML: at 09:41

## 2022-06-12 RX ADMIN — GABAPENTIN 600 MG: 600 TABLET, FILM COATED ORAL at 21:26

## 2022-06-12 RX ADMIN — POTASSIUM CHLORIDE 40 MEQ: 1500 TABLET, EXTENDED RELEASE ORAL at 02:14

## 2022-06-12 RX ADMIN — PREDNISONE 30 MG: 20 TABLET ORAL at 09:42

## 2022-06-12 RX ADMIN — BUDESONIDE 0.5 MG: 0.5 INHALANT RESPIRATORY (INHALATION) at 07:39

## 2022-06-12 RX ADMIN — Medication 3 ML: at 21:34

## 2022-06-12 RX ADMIN — IPRATROPIUM BROMIDE AND ALBUTEROL SULFATE 3 ML: .5; 3 SOLUTION RESPIRATORY (INHALATION) at 17:14

## 2022-06-12 RX ADMIN — HYDROCODONE BITARTRATE AND ACETAMINOPHEN 1 TABLET: 10; 325 TABLET ORAL at 18:57

## 2022-06-12 RX ADMIN — CALCIUM CARBONATE-VITAMIN D TAB 500 MG-200 UNIT 1 TABLET: 500-200 TAB at 09:42

## 2022-06-12 RX ADMIN — NICOTINE 1 PATCH: 21 PATCH, EXTENDED RELEASE TRANSDERMAL at 09:44

## 2022-06-12 RX ADMIN — GABAPENTIN 600 MG: 600 TABLET, FILM COATED ORAL at 09:42

## 2022-06-12 RX ADMIN — FUROSEMIDE 40 MG: 10 INJECTION, SOLUTION INTRAMUSCULAR; INTRAVENOUS at 09:43

## 2022-06-12 RX ADMIN — Medication 1000 UNITS: at 09:42

## 2022-06-12 RX ADMIN — PIPERACILLIN AND TAZOBACTAM 3.38 G: 3; .375 INJECTION, POWDER, LYOPHILIZED, FOR SOLUTION INTRAVENOUS at 02:14

## 2022-06-12 RX ADMIN — PIPERACILLIN AND TAZOBACTAM 3.38 G: 3; .375 INJECTION, POWDER, LYOPHILIZED, FOR SOLUTION INTRAVENOUS at 09:41

## 2022-06-12 RX ADMIN — IPRATROPIUM BROMIDE AND ALBUTEROL SULFATE 3 ML: .5; 3 SOLUTION RESPIRATORY (INHALATION) at 22:35

## 2022-06-12 RX ADMIN — IPRATROPIUM BROMIDE AND ALBUTEROL SULFATE 3 ML: .5; 3 SOLUTION RESPIRATORY (INHALATION) at 07:35

## 2022-06-12 RX ADMIN — ALPRAZOLAM 0.5 MG: 0.5 TABLET ORAL at 21:31

## 2022-06-12 RX ADMIN — HYDROCODONE BITARTRATE AND ACETAMINOPHEN 1 TABLET: 10; 325 TABLET ORAL at 05:27

## 2022-06-12 RX ADMIN — HYDROCODONE BITARTRATE AND ACETAMINOPHEN 1 TABLET: 10; 325 TABLET ORAL at 12:57

## 2022-06-12 NOTE — PROGRESS NOTES
LOS: 3 days   Patient Care Team:  Park Dubose MD as PCP - General (Family Medicine)        Subjective  - sitting on bed , able to ambulate without O2 supplement and walker    Interval History:     Patient Complaints: Feeling much better    Review of Systems   Constitutional: Positive for fatigue.   Eyes: Negative.    Respiratory: Positive for cough and shortness of breath. Negative for chest tightness.    Cardiovascular: Negative.    Endocrine: Negative.    Genitourinary: Negative.    Musculoskeletal: Positive for back pain and neck pain.   Neurological: Positive for headaches.   Psychiatric/Behavioral: The patient is nervous/anxious.         Objective     Vital Signs  Temp:  [98.1 °F (36.7 °C)-98.7 °F (37.1 °C)] 98.1 °F (36.7 °C)  Heart Rate:  [] 100  Resp:  [16-24] 22  BP: (125-146)/(82-86) 125/84    Physical Exam:     General Appearance:    Alert, cooperative, in no acute distress    Ears:    Ears appear intact with no abnormalities noted   Throat:   No oral lesions, no thrush, oral mucosa moist   Neck:   No adenopathy, supple, trachea midline, no thyromegaly, no   carotid bruit, no JVD   Lungs:     Clear to auscultation, respirations regular, even and             Unlabored     Heart:    Regular rhythm and normal rate, normal S1 and S2, no            murmur, no gallop, no rub, no click   Abdomen:     Normal bowel sounds, no masses, no organomegaly, soft        nontender, nondistended, no guarding, no rebound                tenderness   Extremities:   Moves all extremities well, no edema, no cyanosis, no             redness   Skin:   No bleeding, bruising or rash   Neurologic:   Cranial nerves 2 - 12 grossly intact, sensation intact, DTR       present and equal bilaterally        Results Review:    Lab Results (last 24 hours)     Procedure Component Value Units Date/Time    Blood Culture - Blood, Arm, Left [[id number]]  (Normal) Collected: 06/08/22 0348    Specimen: Blood from Arm, Left  Updated: 06/12/22 0433     Blood Culture No growth at 4 days    Comprehensive Metabolic Panel [[id number]]  (Abnormal) Collected: 06/12/22 0030    Specimen: Blood Updated: 06/12/22 0101     Glucose 283 mg/dL      BUN 25 mg/dL      Creatinine 0.73 mg/dL      Sodium 140 mmol/L      Potassium 3.1 mmol/L      Comment: Slight hemolysis detected by analyzer. Results may be affected.        Chloride 94 mmol/L      CO2 33.0 mmol/L      Calcium 8.7 mg/dL      Total Protein 5.5 g/dL      Albumin 3.40 g/dL      ALT (SGPT) 41 U/L      AST (SGOT) 21 U/L      Alkaline Phosphatase 68 U/L      Total Bilirubin <0.2 mg/dL      Globulin 2.1 gm/dL      A/G Ratio 1.6 g/dL      BUN/Creatinine Ratio 34.2     Anion Gap 13.0 mmol/L      eGFR 95.5 mL/min/1.73      Comment: National Kidney Foundation and American Society of Nephrology (ASN) Task Force recommended calculation based on the Chronic Kidney Disease Epidemiology Collaboration (CKD-EPI) equation refit without adjustment for race.       Narrative:      GFR Normal >60  Chronic Kidney Disease <60  Kidney Failure <15      Magnesium [[id number]]  (Normal) Collected: 06/12/22 0030    Specimen: Blood Updated: 06/12/22 0101     Magnesium 1.9 mg/dL     Blood Culture - Blood, Arm, Right [[id number]]  (Normal) Collected: 06/07/22 2101    Specimen: Blood from Arm, Right Updated: 06/11/22 2132     Blood Culture No growth at 4 days         Imaging Results (Last 24 Hours)     ** No results found for the last 24 hours. **           I reviewed the patient's other test results and agree with the interpretation    Medication Review:     Current Facility-Administered Medications:   •  ALPRAZolam (XANAX) tablet 0.5 mg, 0.5 mg, Oral, Q6H PRN, Park Dubose MD, 0.5 mg at 06/11/22 2047  •  aluminum-magnesium hydroxide-simethicone (MAALOX MAX) 400-400-40 MG/5ML suspension 15 mL, 15 mL, Oral, Q6H PRN, Park Dubose MD, 15 mL at 06/10/22 2329  •  budesonide (PULMICORT) nebulizer solution 0.5  mg, 0.5 mg, Nebulization, BID - RT, Park Dubose MD, 0.5 mg at 06/12/22 0739  •  calcium 500 mg vitamin D 5 mcg (200 UT) per tablet 1 tablet, 1 tablet, Oral, Daily, Park Dubose MD, 1 tablet at 06/12/22 0942  •  cholecalciferol (VITAMIN D3) tablet 1,000 Units, 1,000 Units, Oral, Daily, Park Dubose MD, 1,000 Units at 06/12/22 0942  •  docusate sodium (COLACE) capsule 100 mg, 100 mg, Oral, BID, Park Dubose MD, 100 mg at 06/09/22 2021  •  Enoxaparin Sodium (LOVENOX) syringe 40 mg, 40 mg, Subcutaneous, Daily, Park Dubose MD, 40 mg at 06/11/22 1622  •  furosemide (LASIX) injection 40 mg, 40 mg, Intravenous, Daily, Fang Senior APRN, 40 mg at 06/12/22 0943  •  gabapentin (NEURONTIN) tablet 600 mg, 600 mg, Oral, TID, Park Dubose MD, 600 mg at 06/12/22 0942  •  hydrALAZINE (APRESOLINE) injection 10 mg, 10 mg, Intravenous, Q6H PRN, Park Dubose MD, 10 mg at 06/08/22 0412  •  HYDROcodone-acetaminophen (NORCO)  MG per tablet 1 tablet, 1 tablet, Oral, Q6H PRN, Park Dubose MD, 1 tablet at 06/12/22 1257  •  ipratropium-albuterol (DUO-NEB) nebulizer solution 3 mL, 3 mL, Nebulization, Q6H PRN, Park Dubose MD, 3 mL at 06/12/22 1714  •  ipratropium-albuterol (DUO-NEB) nebulizer solution 3 mL, 3 mL, Nebulization, BID - RT, Park Dubose MD, 3 mL at 06/12/22 0735  •  Magnesium Sulfate 2 gram Bolus, followed by 8 gram infusion (total Mg dose 10 grams)- Mg less than or equal to 1mg/dL, 2 g, Intravenous, PRN **OR** Magnesium Sulfate 2 gram / 50mL Infusion (GIVE X 3 BAGS TO EQUAL 6GM TOTAL DOSE) - Mg 1.1 - 1.5 mg/dl, 2 g, Intravenous, PRN **OR** Magnesium Sulfate 4 gram infusion- Mg 1.6-1.9 mg/dL, 4 g, Intravenous, PRN, Park Dubose MD  •  metoprolol tartrate (LOPRESSOR) injection 5 mg, 5 mg, Intravenous, Q4H PRN, Park Dubose MD, 5 mg at 06/08/22 1211  •  nicotine (NICODERM CQ) 21 MG/24HR patch  1 patch, 1 patch, Transdermal, Q24H, Park Dubose MD, 1 patch at 06/12/22 0944  •  nitroglycerin (NITROSTAT) SL tablet 0.4 mg, 0.4 mg, Sublingual, Q5 Min PRN, Park Dubose MD  •  ondansetron (ZOFRAN) injection 4 mg, 4 mg, Intravenous, Q6H PRN, Park Dubose MD  •  piperacillin-tazobactam (ZOSYN) IVPB 3.375 g in 100 mL NS (CD), 3.375 g, Intravenous, Q8H, Jaswinder Claire MD, Last Rate: 100 mL/hr at 06/11/22 1835, 3.375 g at 06/12/22 0941  •  potassium chloride (K-DUR,KLOR-CON) CR tablet 40 mEq, 40 mEq, Oral, PRN, 40 mEq at 06/12/22 0942 **OR** potassium chloride (KLOR-CON) packet 40 mEq, 40 mEq, Oral, PRN **OR** potassium chloride 10 mEq in 100 mL IVPB, 10 mEq, Intravenous, Q1H PRN, Park Dubose MD  •  [COMPLETED] predniSONE (DELTASONE) tablet 30 mg, 30 mg, Oral, Daily, 30 mg at 06/12/22 0942 **FOLLOWED BY** [START ON 6/13/2022] predniSONE (DELTASONE) tablet 20 mg, 20 mg, Oral, Daily **FOLLOWED BY** [START ON 6/15/2022] predniSONE (DELTASONE) tablet 10 mg, 10 mg, Oral, Daily, Fang Senior APRN  •  sodium chloride 0.9 % flush 3 mL, 3 mL, Intravenous, Q12H, Park Dubose MD, 3 mL at 06/12/22 0941  •  sodium chloride 0.9 % flush 3-10 mL, 3-10 mL, Intravenous, PRN, Park Dubose MD    I have reviewed medication list.    Assessment & Plan     Active Problems:    Acute Bronchitis related to Parainfluenza  - done   Acute respiratory failure - Improving , wean off O2 and Pulmonary consulted   COPD Exacerbation with severe broncho spasm -  Wean off  Steroid, nebs and O2 PRN, Pulmonary consulted   Pulmonary Edema with elevated BNP -  2D ECHO WNL   Acute diastolic CHF - Better with IV diuretics last dose this evening, good UOP   Hypokalemia - Potassium supplement    Uncontrolled HTN  - Better, PRN IV Hydralazine    THEODORE - Refused sleep study   Hx of carcinosarcoma of the endometrium and Invasive right breast cancer   Tobacco abuse - counseled to quit smoking     Chronic Pain - Pain control   Generalized anxiety disorder    Generalized weakness - PT recommended inpatient  Rehab     Stress ulcer/DVT prophylaxis           Plan for disposition: discharge home in AM, after her last dose IV diuretics     Park Dubose MD  06/12/22  17:43 EDT

## 2022-06-12 NOTE — PROGRESS NOTES
"PULMONARY CRITICAL CARE Progress  NOTE      PATIENT IDENTIFICATION:  Name: Xin Nj  MRN: [id number]  :  1965     Age: 56 y.o.  Sex: female    DATE OF Note:  2022   Referring Physician: Park Dubose MD                  Subjective:   Feeling  better, decreased to room air, coughing some, no chest or abd pain, no bowel or bladder issues     Objective:  tMax 24 hrs: Temp (24hrs), Av.8 °F (37.1 °C), Min:98.6 °F (37 °C), Max:99 °F (37.2 °C)      Vitals Ranges:   Temp:  [98.6 °F (37 °C)-99 °F (37.2 °C)] 98.7 °F (37.1 °C)  Heart Rate:  [80-97] 82  Resp:  [16-20] 18  BP: (130-146)/(82-86) 146/86    Intake and Output Last 3 Shifts:   I/O last 3 completed shifts:  In:  [P.O.:]  Out:  [Urine:1]    Exam:  /86 (BP Location: Right arm, Patient Position: Lying)   Pulse 82   Temp 98.7 °F (37.1 °C) (Oral)   Resp 18   Ht 172.7 cm (68\")   Wt 98 kg (216 lb)   SpO2 91%   BMI 32.84 kg/m²     General Appearance:  Alert   HEENT:  Normocephalic, without obvious abnormality, Conjunctivae/corneas clear.  Normal external ear canals, nares normal, no drainage     Neck:  Supple, symmetrical, trachea midline. No JVD.  Lungs /Chest wall:   Bilateral basal rhonchi, wheezing, respirations unlabored, symmetrical wall movement.     Heart:  Regular rate and rhythm, systolic murmur PMI left sternal border  Abdomen: Soft, nontender, no masses, no organomegaly.    Extremities: Trace edema, no clubbing or cyanosis        Medications:    Current Facility-Administered Medications:   •  ALPRAZolam (XANAX) tablet 0.5 mg, 0.5 mg, Oral, Q6H PRN, Park Dubose MD, 0.5 mg at 22  •  aluminum-magnesium hydroxide-simethicone (MAALOX MAX) 400-400-40 MG/5ML suspension 15 mL, 15 mL, Oral, Q6H PRN, Park Dubose MD, 15 mL at 06/10/22 3643  •  budesonide (PULMICORT) nebulizer solution 0.5 mg, 0.5 mg, Nebulization, BID - RT, Park Dubose MD, 0.5 mg at 22 2017  •  " calcium 500 mg vitamin D 5 mcg (200 UT) per tablet 1 tablet, 1 tablet, Oral, Daily, Park Dubose MD, 1 tablet at 06/11/22 0749  •  cholecalciferol (VITAMIN D3) tablet 1,000 Units, 1,000 Units, Oral, Daily, Park Dubose MD, 1,000 Units at 06/11/22 0749  •  docusate sodium (COLACE) capsule 100 mg, 100 mg, Oral, BID, Park Dubose MD, 100 mg at 06/09/22 2021  •  Enoxaparin Sodium (LOVENOX) syringe 40 mg, 40 mg, Subcutaneous, Daily, Park Dubose MD, 40 mg at 06/11/22 1622  •  furosemide (LASIX) injection 40 mg, 40 mg, Intravenous, Daily, Fang Senior, APRN, 40 mg at 06/11/22 0749  •  gabapentin (NEURONTIN) tablet 600 mg, 600 mg, Oral, TID, Park Dubose MD, 600 mg at 06/11/22 2047  •  hydrALAZINE (APRESOLINE) injection 10 mg, 10 mg, Intravenous, Q6H PRN, Park Dubose MD, 10 mg at 06/08/22 0412  •  HYDROcodone-acetaminophen (NORCO)  MG per tablet 1 tablet, 1 tablet, Oral, Q6H PRN, Park Dubose MD, 1 tablet at 06/12/22 0527  •  ipratropium-albuterol (DUO-NEB) nebulizer solution 3 mL, 3 mL, Nebulization, Q6H PRN, Park Dubose MD, 3 mL at 06/11/22 1733  •  ipratropium-albuterol (DUO-NEB) nebulizer solution 3 mL, 3 mL, Nebulization, BID - RT, Park Dubose MD, 3 mL at 06/12/22 0735  •  Magnesium Sulfate 2 gram Bolus, followed by 8 gram infusion (total Mg dose 10 grams)- Mg less than or equal to 1mg/dL, 2 g, Intravenous, PRN **OR** Magnesium Sulfate 2 gram / 50mL Infusion (GIVE X 3 BAGS TO EQUAL 6GM TOTAL DOSE) - Mg 1.1 - 1.5 mg/dl, 2 g, Intravenous, PRN **OR** Magnesium Sulfate 4 gram infusion- Mg 1.6-1.9 mg/dL, 4 g, Intravenous, PRN, Park Dubose MD  •  metoprolol tartrate (LOPRESSOR) injection 5 mg, 5 mg, Intravenous, Q4H PRN, Park Dubose MD, 5 mg at 06/08/22 1211  •  nicotine (NICODERM CQ) 21 MG/24HR patch 1 patch, 1 patch, Transdermal, Q24H, Park Dubose MD, 1 patch at 06/11/22  0752  •  nitroglycerin (NITROSTAT) SL tablet 0.4 mg, 0.4 mg, Sublingual, Q5 Min PRN, Park Dubose MD  •  ondansetron (ZOFRAN) injection 4 mg, 4 mg, Intravenous, Q6H PRN, Park Dubose MD  •  piperacillin-tazobactam (ZOSYN) IVPB 3.375 g in 100 mL NS (CD), 3.375 g, Intravenous, Q8H, Jaswinder Claire MD, Last Rate: 100 mL/hr at 06/11/22 1835, 3.375 g at 06/12/22 0214  •  potassium chloride (K-DUR,KLOR-CON) CR tablet 40 mEq, 40 mEq, Oral, PRN, 40 mEq at 06/12/22 0214 **OR** potassium chloride (KLOR-CON) packet 40 mEq, 40 mEq, Oral, PRN **OR** potassium chloride 10 mEq in 100 mL IVPB, 10 mEq, Intravenous, Q1H PRN, Park Dubose MD  •  predniSONE (DELTASONE) tablet 30 mg, 30 mg, Oral, Daily, 30 mg at 06/11/22 1622 **FOLLOWED BY** [START ON 6/13/2022] predniSONE (DELTASONE) tablet 20 mg, 20 mg, Oral, Daily **FOLLOWED BY** [START ON 6/15/2022] predniSONE (DELTASONE) tablet 10 mg, 10 mg, Oral, Daily, Fang Senior, APRN  •  sodium chloride 0.9 % flush 3 mL, 3 mL, Intravenous, Q12H, Park Dubose MD, 3 mL at 06/11/22 0749  •  sodium chloride 0.9 % flush 3-10 mL, 3-10 mL, Intravenous, PRN, Park Dubose MD    Data Review:  All labs (24hrs):   Recent Results (from the past 24 hour(s))   Comprehensive Metabolic Panel    Collection Time: 06/12/22 12:30 AM    Specimen: Blood   Result Value Ref Range    Glucose 283 (H) 65 - 99 mg/dL    BUN 25 (H) 6 - 20 mg/dL    Creatinine 0.73 0.57 - 1.00 mg/dL    Sodium 140 136 - 145 mmol/L    Potassium 3.1 (L) 3.5 - 5.2 mmol/L    Chloride 94 (L) 98 - 107 mmol/L    CO2 33.0 (H) 22.0 - 29.0 mmol/L    Calcium 8.7 8.6 - 10.5 mg/dL    Total Protein 5.5 (L) 6.0 - 8.5 g/dL    Albumin 3.40 (L) 3.50 - 5.20 g/dL    ALT (SGPT) 41 (H) 1 - 33 U/L    AST (SGOT) 21 1 - 32 U/L    Alkaline Phosphatase 68 39 - 117 U/L    Total Bilirubin <0.2 0.0 - 1.2 mg/dL    Globulin 2.1 gm/dL    A/G Ratio 1.6 g/dL    BUN/Creatinine Ratio 34.2 (H) 7.0 - 25.0    Anion Gap 13.0  5.0 - 15.0 mmol/L    eGFR 95.5 >60.0 mL/min/1.73   Magnesium    Collection Time: 06/12/22 12:30 AM    Specimen: Blood   Result Value Ref Range    Magnesium 1.9 1.6 - 2.6 mg/dL        Imaging:  Adult Transthoracic Echo Complete W/ Cont if Necessary Per Protocol  · Estimated left ventricular EF was in agreement with the calculated left   ventricular EF. Left ventricular ejection fraction appears to be 56 - 60%.  · Left ventricular diastolic function is consistent with (grade II w/high   LAP) pseudonormalization.  · The study is technically difficult for diagnosis.          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 Dakotah Senior, APRN   6/12/2022  08:55 EDT          I personally have examined  and interviewed the patient. I have reviewed the history, data, problems, assessment and plan with our NP.  Critical care time in direct medical management (   ) minutes  Electronically signed by Jaswinder Claire MD. D, ABSM.

## 2022-06-12 NOTE — PLAN OF CARE
Goal Outcome Evaluation:  Plan of Care Reviewed With: patient        Progress: improving  Outcome Evaluation: Patient had an uneventful evening. Gave PRN pain meds per MAR. Daughter remained at bedside. Working on rehab placement. Potassium protocol iniated.

## 2022-06-12 NOTE — PROGRESS NOTES
LOS: 2 days   Patient Care Team:  Park Dubose MD as PCP - General (Family Medicine)        Subjective  - Lying on bed , looking better    Interval History:     Patient Complaints: Less SOB with  activity     Review of Systems   Constitutional: Positive for activity change, appetite change and fatigue.   Eyes: Negative.    Respiratory: Positive for cough, shortness of breath and wheezing. Negative for chest tightness.    Cardiovascular: Negative.    Gastrointestinal: Positive for nausea.   Endocrine: Negative.    Genitourinary: Negative.    Musculoskeletal: Positive for back pain and neck pain.   Neurological: Positive for dizziness, weakness, light-headedness and headaches.   Psychiatric/Behavioral: The patient is nervous/anxious.         Objective     Vital Signs  Temp:  [97.6 °F (36.4 °C)-99 °F (37.2 °C)] 98.6 °F (37 °C)  Heart Rate:  [70-97] 92  Resp:  [18-20] 18  BP: (130-134)/(82-86) 130/82    Physical Exam:     General Appearance:    Alert, cooperative, in no acute distress    Ears:    Ears appear intact with no abnormalities noted   Throat:   No oral lesions, no thrush, oral mucosa moist   Neck:   No adenopathy, supple, trachea midline, no thyromegaly, no   carotid bruit, no JVD   Lungs:     wheezing to auscultation, respirations regular, even and             Unlabored     Heart:    Regular rhythm and normal rate, normal S1 and S2, no            murmur, no gallop, no rub, no click   Abdomen:     Normal bowel sounds, no masses, no organomegaly, soft        nontender, nondistended, no guarding, no rebound                tenderness   Extremities:   Moves all extremities well, no edema, no cyanosis, no             redness   Skin:   No bleeding, bruising or rash   Neurologic:   Cranial nerves 2 - 12 grossly intact, sensation intact, DTR       present and equal bilaterally        Results Review:    Lab Results (last 24 hours)     Procedure Component Value Units Date/Time    Blood Culture - Blood,  Arm, Right [[id number]]  (Normal) Collected: 06/07/22 2101    Specimen: Blood from Arm, Right Updated: 06/11/22 2132     Blood Culture No growth at 4 days    Blood Culture - Blood, Arm, Left [[id number]]  (Normal) Collected: 06/08/22 0348    Specimen: Blood from Arm, Left Updated: 06/11/22 0431     Blood Culture No growth at 3 days    Comprehensive Metabolic Panel [[id number]]  (Abnormal) Collected: 06/11/22 0238    Specimen: Blood Updated: 06/11/22 0336     Glucose 172 mg/dL      BUN 24 mg/dL      Creatinine 0.60 mg/dL      Sodium 144 mmol/L      Potassium 3.4 mmol/L      Chloride 98 mmol/L      CO2 38.0 mmol/L      Calcium 8.4 mg/dL      Total Protein 5.5 g/dL      Albumin 3.20 g/dL      ALT (SGPT) 39 U/L      AST (SGOT) 23 U/L      Alkaline Phosphatase 61 U/L      Total Bilirubin <0.2 mg/dL      Globulin 2.3 gm/dL      A/G Ratio 1.4 g/dL      BUN/Creatinine Ratio 40.0     Anion Gap 8.0 mmol/L      eGFR 104.2 mL/min/1.73      Comment: National Kidney Foundation and American Society of Nephrology (ASN) Task Force recommended calculation based on the Chronic Kidney Disease Epidemiology Collaboration (CKD-EPI) equation refit without adjustment for race.       Narrative:      GFR Normal >60  Chronic Kidney Disease <60  Kidney Failure <15      Magnesium [[id number]]  (Normal) Collected: 06/11/22 0238    Specimen: Blood Updated: 06/11/22 0336     Magnesium 2.1 mg/dL     BNP [[id number]]  (Normal) Collected: 06/11/22 0238    Specimen: Blood Updated: 06/11/22 0332     proBNP 264.6 pg/mL     Narrative:      Among patients with dyspnea, NT-proBNP is highly sensitive for the detection of acute congestive heart failure. In addition NT-proBNP of <300 pg/ml effectively rules out acute congestive heart failure with 99% negative predictive value.    Results may be falsely decreased if patient taking Biotin.           Imaging Results (Last 24 Hours)     ** No results found for the last 24 hours. **           I reviewed the patient's  other test results and agree with the interpretation    Medication Review:     Current Facility-Administered Medications:   •  ALPRAZolam (XANAX) tablet 0.5 mg, 0.5 mg, Oral, Q6H PRN, Park Dubose MD, 0.5 mg at 06/11/22 2047  •  aluminum-magnesium hydroxide-simethicone (MAALOX MAX) 400-400-40 MG/5ML suspension 15 mL, 15 mL, Oral, Q6H PRN, Park Dubose MD, 15 mL at 06/10/22 2329  •  budesonide (PULMICORT) nebulizer solution 0.5 mg, 0.5 mg, Nebulization, BID - RT, Park Dubose MD, 0.5 mg at 06/11/22 1941  •  calcium 500 mg vitamin D 5 mcg (200 UT) per tablet 1 tablet, 1 tablet, Oral, Daily, Park Dubose MD, 1 tablet at 06/11/22 0749  •  cholecalciferol (VITAMIN D3) tablet 1,000 Units, 1,000 Units, Oral, Daily, Park Dubose MD, 1,000 Units at 06/11/22 0749  •  docusate sodium (COLACE) capsule 100 mg, 100 mg, Oral, BID, Park Dubose MD, 100 mg at 06/09/22 2021  •  Enoxaparin Sodium (LOVENOX) syringe 40 mg, 40 mg, Subcutaneous, Daily, Park Dubose MD, 40 mg at 06/11/22 1622  •  furosemide (LASIX) injection 40 mg, 40 mg, Intravenous, Daily, Fang Senior APRN, 40 mg at 06/11/22 0749  •  gabapentin (NEURONTIN) tablet 600 mg, 600 mg, Oral, TID, Park Dubose MD, 600 mg at 06/11/22 2047  •  hydrALAZINE (APRESOLINE) injection 10 mg, 10 mg, Intravenous, Q6H PRN, Park Dubose MD, 10 mg at 06/08/22 0412  •  HYDROcodone-acetaminophen (NORCO)  MG per tablet 1 tablet, 1 tablet, Oral, Q6H PRN, Park Dubose MD, 1 tablet at 06/11/22 2306  •  ipratropium-albuterol (DUO-NEB) nebulizer solution 3 mL, 3 mL, Nebulization, Q6H PRN, Park Dubose MD, 3 mL at 06/11/22 1733  •  ipratropium-albuterol (DUO-NEB) nebulizer solution 3 mL, 3 mL, Nebulization, BID - RT, Park Dubose MD, 3 mL at 06/11/22 1937  •  Magnesium Sulfate 2 gram Bolus, followed by 8 gram infusion (total Mg dose 10 grams)- Mg less than or  equal to 1mg/dL, 2 g, Intravenous, PRN **OR** Magnesium Sulfate 2 gram / 50mL Infusion (GIVE X 3 BAGS TO EQUAL 6GM TOTAL DOSE) - Mg 1.1 - 1.5 mg/dl, 2 g, Intravenous, PRN **OR** Magnesium Sulfate 4 gram infusion- Mg 1.6-1.9 mg/dL, 4 g, Intravenous, PRN, Park Dubose MD  •  metoprolol tartrate (LOPRESSOR) injection 5 mg, 5 mg, Intravenous, Q4H PRN, Park Dubose MD, 5 mg at 06/08/22 1211  •  nicotine (NICODERM CQ) 21 MG/24HR patch 1 patch, 1 patch, Transdermal, Q24H, Park Dubose MD, 1 patch at 06/11/22 0752  •  nitroglycerin (NITROSTAT) SL tablet 0.4 mg, 0.4 mg, Sublingual, Q5 Min PRN, Park Dubose MD  •  ondansetron (ZOFRAN) injection 4 mg, 4 mg, Intravenous, Q6H PRN, Park Dubose MD  •  piperacillin-tazobactam (ZOSYN) IVPB 3.375 g in 100 mL NS (CD), 3.375 g, Intravenous, Q8H, Jaswinder Claire MD, Last Rate: 100 mL/hr at 06/11/22 1835, 3.375 g at 06/11/22 1835  •  potassium chloride (K-DUR,KLOR-CON) CR tablet 40 mEq, 40 mEq, Oral, PRN **OR** potassium chloride (KLOR-CON) packet 40 mEq, 40 mEq, Oral, PRN **OR** potassium chloride 10 mEq in 100 mL IVPB, 10 mEq, Intravenous, Q1H PRN, Park Dubose MD  •  predniSONE (DELTASONE) tablet 30 mg, 30 mg, Oral, Daily, 30 mg at 06/11/22 1622 **FOLLOWED BY** [START ON 6/13/2022] predniSONE (DELTASONE) tablet 20 mg, 20 mg, Oral, Daily **FOLLOWED BY** [START ON 6/15/2022] predniSONE (DELTASONE) tablet 10 mg, 10 mg, Oral, Daily, Fang Senior APRN  •  sodium chloride 0.9 % flush 3 mL, 3 mL, Intravenous, Q12H, Park Dubose MD, 3 mL at 06/11/22 0749  •  sodium chloride 0.9 % flush 3-10 mL, 3-10 mL, Intravenous, PRN, Park Dubose MD    I have reviewed medication list.    Assessment & Plan     Active Problems:    Acute Bronchitis related to Parainfluenza  - IV Abx   Acute respiratory failure - Improving , wean off O2 and Pulmonary consulted   COPD Exacerbation with severe broncho spasm -  Wean off   Steroid, nebs and O2 PRN, Pulmonary consulted   Pulmonary Edema with elevated BNP -  2D ECHO WNL   Acute diastolic CHF - Better with IV diuretics, good UOP   Hypokalemia - Potassium supplement    Uncontrolled HTN  - Better, PRN IV Hydralazine    THEODORE - Refused sleep study   Hx of carcinosarcoma of the endometrium and Invasive right breast cancer   Tobacco abuse - counseled to quit smoking    Chronic Pain - Pain control   Generalized anxiety disorder    Generalized weakness - PT recommended inpatient  Rehab     Stress ulcer/DVT prophylaxis           Plan for disposition: wants to go home at discharge      Park Dubose MD  06/11/22  23:21 EDT

## 2022-06-12 NOTE — PROGRESS NOTES
Exercise Oximetry    Patient Name:Xin Nj   MRN: [id number]   Date: 06/12/22             ROOM AIR BASELINE   SpO2% 94   Heart Rate 111   Blood Pressure      EXERCISE ON ROOM AIR SpO2% EXERCISE ON O2 @  LPM SpO2%   1 MINUTE 94 1 MINUTE    2 MINUTES 92 2 MINUTES    3 MINUTES 94 3 MINUTES    4 MINUTES 93 4 MINUTES    5 MINUTES 92 5 MINUTES    6 MINUTES 92 6 MINUTES               Distance Walked  6 minutes in [place] Distance Walked   Dyspnea (Chris Scale)   Dyspnea (Chris Scale)   Fatigue (Chris Scale)   Fatigue (Chris Scale)   SpO2% Post Exercise  93 SpO2% Post Exercise   HR Post Exercise  126 HR Post Exercise   Time to Recovery  0 Time to Recovery     Comments: Patient walked in [place] for six minutes without pausing or stopping.

## 2022-06-12 NOTE — PLAN OF CARE
Goal Outcome Evaluation:  Plan of Care Reviewed With: patient        Progress: improving  Outcome Evaluation: patient resting in bed throughout shift. complaints of pain treated per mar. daughter at bedside. PT reevaluated and no longer a need for rehab. potential discharge later tonight.

## 2022-06-12 NOTE — THERAPY EVALUATION
Subjective: Pt agreeable to therapeutic plan of care.  Cognition: oriented to Person, Place, Time and Situation    Objective:     Bed Mobility: Modified-Independent  Functional Transfers: Independent  Functional Ambulation: SBA without AD and on RA short household distance.    Toileting: Independent   ADL Position:   ADL Comments: Pt reports she just got up to the bathroom to use the toilet with no assistance from staff or family.      Vitals: WNL on RA  SPO2 97% on 2L NC  Following short functional mobility pt with desaturations to 90% on RA  Pt recovered to 93-94% after 1 minute on RA.    Pain: 0 VAS  Education: Provided education on importance of mobility and skilled verbal / tactile cueing throughout intervention.     Assessment: Xin Nj presents with ADL impairments below baseline abilities which indicate the need for continued skilled intervention while inpatient. Pt with increased functional mobility this date as seen by independence with transfers and SBA for functional mobility. OT recommending HHOT and family assist. Pt will need a 6 minute walk test and RW to prepare for safe d/c. Pt educated on activity pacing and ADL modifications secondary to pt's fatigue and decreased endurance with activity. Pt agreeable to education. Tolerating session today without incident. Will continue to follow and progress as tolerated.     Plan/Recommendations:   Pt would benefit from Home with Home Health, Home with family assist at discharge from facility.   Pt desires Home with Home Health and Home with family assist at discharge. Pt cooperative; agreeable to therapeutic recommendations and plan of care.     Modified [place]: N/A = No pre-op stroke/TIA    Post-Tx Position: Staff Present and Call light and personal items within reach  PPE: gloves, surgical mask, eyewear protection

## 2022-06-13 ENCOUNTER — READMISSION MANAGEMENT (OUTPATIENT)
Dept: CALL CENTER | Facility: HOSPITAL | Age: 57
End: 2022-06-13

## 2022-06-13 VITALS
BODY MASS INDEX: 32.74 KG/M2 | TEMPERATURE: 98.1 F | SYSTOLIC BLOOD PRESSURE: 126 MMHG | WEIGHT: 216 LBS | RESPIRATION RATE: 18 BRPM | HEIGHT: 68 IN | DIASTOLIC BLOOD PRESSURE: 84 MMHG | HEART RATE: 89 BPM | OXYGEN SATURATION: 95 %

## 2022-06-13 LAB
ALBUMIN SERPL-MCNC: 3.1 G/DL (ref 3.5–5.2)
ALBUMIN/GLOB SERPL: 1.4 G/DL
ALP SERPL-CCNC: 66 U/L (ref 39–117)
ALT SERPL W P-5'-P-CCNC: 34 U/L (ref 1–33)
ANION GAP SERPL CALCULATED.3IONS-SCNC: 11 MMOL/L (ref 5–15)
AST SERPL-CCNC: 17 U/L (ref 1–32)
BACTERIA SPEC AEROBE CULT: NORMAL
BILIRUB SERPL-MCNC: <0.2 MG/DL (ref 0–1.2)
BUN SERPL-MCNC: 19 MG/DL (ref 6–20)
BUN/CREAT SERPL: 27.9 (ref 7–25)
CALCIUM SPEC-SCNC: 8.5 MG/DL (ref 8.6–10.5)
CHLORIDE SERPL-SCNC: 101 MMOL/L (ref 98–107)
CO2 SERPL-SCNC: 31 MMOL/L (ref 22–29)
CREAT SERPL-MCNC: 0.68 MG/DL (ref 0.57–1)
EGFRCR SERPLBLD CKD-EPI 2021: 101.1 ML/MIN/1.73
GLOBULIN UR ELPH-MCNC: 2.2 GM/DL
GLUCOSE SERPL-MCNC: 161 MG/DL (ref 65–99)
MAGNESIUM SERPL-MCNC: 2 MG/DL (ref 1.6–2.6)
POTASSIUM SERPL-SCNC: 3.4 MMOL/L (ref 3.5–5.2)
PROT SERPL-MCNC: 5.3 G/DL (ref 6–8.5)
SODIUM SERPL-SCNC: 143 MMOL/L (ref 136–145)

## 2022-06-13 PROCEDURE — 80053 COMPREHEN METABOLIC PANEL: CPT | Performed by: FAMILY MEDICINE

## 2022-06-13 PROCEDURE — 63710000001 PREDNISONE PER 1 MG: Performed by: NURSE PRACTITIONER

## 2022-06-13 PROCEDURE — 94799 UNLISTED PULMONARY SVC/PX: CPT

## 2022-06-13 PROCEDURE — 94664 DEMO&/EVAL PT USE INHALER: CPT

## 2022-06-13 PROCEDURE — 25010000002 FUROSEMIDE PER 20 MG: Performed by: NURSE PRACTITIONER

## 2022-06-13 PROCEDURE — 94760 N-INVAS EAR/PLS OXIMETRY 1: CPT

## 2022-06-13 PROCEDURE — 83735 ASSAY OF MAGNESIUM: CPT | Performed by: FAMILY MEDICINE

## 2022-06-13 RX ORDER — NICOTINE 21 MG/24HR
1 PATCH, TRANSDERMAL 24 HOURS TRANSDERMAL
Qty: 14 PATCH | Refills: 0 | Status: SHIPPED | OUTPATIENT
Start: 2022-06-13

## 2022-06-13 RX ORDER — PREDNISONE 20 MG/1
20 TABLET ORAL DAILY
Qty: 2 TABLET | Refills: 0 | Status: SHIPPED | OUTPATIENT
Start: 2022-06-13 | End: 2022-06-15

## 2022-06-13 RX ORDER — PREDNISONE 10 MG/1
10 TABLET ORAL DAILY
Qty: 6 TABLET | Refills: 0 | Status: SHIPPED | OUTPATIENT
Start: 2022-06-15

## 2022-06-13 RX ADMIN — GABAPENTIN 600 MG: 600 TABLET, FILM COATED ORAL at 08:32

## 2022-06-13 RX ADMIN — IPRATROPIUM BROMIDE AND ALBUTEROL SULFATE 3 ML: .5; 3 SOLUTION RESPIRATORY (INHALATION) at 06:30

## 2022-06-13 RX ADMIN — BUDESONIDE 0.5 MG: 0.5 INHALANT RESPIRATORY (INHALATION) at 06:34

## 2022-06-13 RX ADMIN — Medication 1000 UNITS: at 08:32

## 2022-06-13 RX ADMIN — CALCIUM CARBONATE-VITAMIN D TAB 500 MG-200 UNIT 1 TABLET: 500-200 TAB at 08:32

## 2022-06-13 RX ADMIN — FUROSEMIDE 40 MG: 10 INJECTION, SOLUTION INTRAMUSCULAR; INTRAVENOUS at 08:32

## 2022-06-13 RX ADMIN — Medication 3 ML: at 08:32

## 2022-06-13 RX ADMIN — HYDROCODONE BITARTRATE AND ACETAMINOPHEN 1 TABLET: 10; 325 TABLET ORAL at 01:15

## 2022-06-13 RX ADMIN — PREDNISONE 20 MG: 20 TABLET ORAL at 08:32

## 2022-06-13 RX ADMIN — POTASSIUM CHLORIDE 40 MEQ: 1500 TABLET, EXTENDED RELEASE ORAL at 05:25

## 2022-06-13 RX ADMIN — DOCUSATE SODIUM 100 MG: 100 CAPSULE, LIQUID FILLED ORAL at 08:32

## 2022-06-13 RX ADMIN — NICOTINE 1 PATCH: 21 PATCH, EXTENDED RELEASE TRANSDERMAL at 08:35

## 2022-06-13 RX ADMIN — HYDROCODONE BITARTRATE AND ACETAMINOPHEN 1 TABLET: 10; 325 TABLET ORAL at 08:33

## 2022-06-13 NOTE — CASE MANAGEMENT/SOCIAL WORK
Case Management Discharge Note    Transportation Services  Private: Car    Final Discharge Disposition Code: 01 - home or self-care       Continued Stay Note  [facility]     Patient Name: Xin Nj  MRN: [id number]  Today's Date: 6/13/2022    Admit Date: 6/7/2022     Discharge Plan     Row Name 06/13/22 1820       Plan    Plan Comments Dr Dubose called , notified [facility] patient stronger over weekend, will dc home instead of to [facility]. Spoke with Lydia for [facility], notified patient to D/C home.  Spoke with patient, she states she already had OP PT set up, she will call to follow up.  No dc needs.                Met with patient in room wearing PPE: mask.      Maintained distance greater than six feet and spent less than 15 minutes in the room.    Cindy Haq RN     Office Phone [phone]  Office Cell [phone]

## 2022-06-13 NOTE — PROGRESS NOTES
Daily Progress Note        Pneumonia    COPD (chronic obstructive pulmonary disease) (Formerly [facility])      Assessment  Acute bronchitis secondary to Parainfluenza Virus 3  Acute respiratory failure  COPD exacerbation  Pulmonary edema w/ elevated BNP >1000 on admit  Acute diastolic CHF  HTN  Features of Sleep apnea- refuses sleep study  H/O carcinosarcoma of the endometrium and Invasive right breast cancer  Tobacco abuse - counseled to quit smoking   Chronic Pain   Generalized anxiety disorder   Generalized weakness    ECHO 6/8/22  - EF 56-60%  -Left ventricular diastolic function consistent w/ pseudonormalization      Plan  F/U in our office in 2 weeks   6 min walk done and passed on room air    Tapering prednisone  Pulmicort and duonebs  Electrolyte/Glycemic control  DVT Prophylaxis- Lovenox  Nicoderm and smoking cessation education, avoid any type of smoker       LOS: 4 days     Subjective     Feeling much better    Objective     Vital signs for last 24 hours:  Vitals:    06/13/22 0630 06/13/22 0633 06/13/22 0634 06/13/22 0638   BP:       BP Location:       Patient Position:       Pulse: 78 83 80 89   Resp: 18 18 18 18   Temp:       TempSrc:       SpO2: 95% 94% 94% 95%   Weight:       Height:           Intake/Output last 3 shifts:  I/O last 3 completed shifts:  In: 1904 [P.O.:1904]  Out: 1 [Urine:1]  Intake/Output this shift:  No intake/output data recorded.      Radiology  Imaging Results (Last 24 Hours)       ** No results found for the last 24 hours. **            Labs:  Results from last 7 days   Lab Units 06/09/22  0059   WBC 10*3/mm3 5.40   HEMOGLOBIN g/dL 12.1   HEMATOCRIT % 37.1   PLATELETS 10*3/mm3 170     Results from last 7 days   Lab Units 06/13/22  0200   SODIUM mmol/L 143   POTASSIUM mmol/L 3.4*   CHLORIDE mmol/L 101   CO2 mmol/L 31.0*   BUN mg/dL 19   CREATININE mg/dL 0.68   CALCIUM mg/dL 8.5*   BILIRUBIN mg/dL <0.2   ALK PHOS U/L 66   ALT (SGPT) U/L 34*   AST (SGOT) U/L 17   GLUCOSE mg/dL 161*         Results  from last 7 days   Lab Units 06/13/22  0200 06/12/22  0030 06/11/22  0238   ALBUMIN g/dL 3.10* 3.40* 3.20*     Results from last 7 days   Lab Units 06/07/22  2101   TROPONIN T ng/mL <0.010         Results from last 7 days   Lab Units 06/13/22  0200   MAGNESIUM mg/dL 2.0                   Meds:   SCHEDULE    Infusions  No current facility-administered medications for this encounter.    PRNs      Physical Exam:  Physical Exam  General Appearance:  Alert. No distress noted.  Cardiovascular: Heart sounds: Murmur heard.   Pulmonary: Minimal bilateral wheezing. Respirations unlabored.   HEENT:  Normocephalic, without obvious abnormality, Conjunctiva/corneas clear,.   Nares normal, no drainage     Neck:  Supple, symmetrical, trachea midline. No JVD.  Abdomen: Soft, non-tender, no masses, no organomegaly.    Extremities: Normal ROM.  Skin: Dry/intact.      ROS  Review of Systems  Constitutional: Negative for chills, fever and malaise/fatigue.   HENT: Negative.    Eyes: Negative.    Skin: Negative.    Pulmonary: Positive for cough and SOB, improved.  Musculoskeletal: Negative.  Gastrointestinal: Negative.  Genitourinary: Negative.    Neurological: Negative.    Psychiatric/Behavioral: Negative.       I have reviewed current clinicals.     Electronically signed by DILEEP Bell, 06/13/22, 3:16 PM EDT.     The NP scribed the note for me, I have examined the patient and reviewed and verified the above findings and and I formulated the assessment and plan as documented

## 2022-06-14 NOTE — OUTREACH NOTE
Prep Survey    Flowsheet Row Responses   Religion facility patient discharged from? [facility]   Is LACE score < 7 ? No   Emergency Room discharge w/ pulse ox? No   Eligibility Readm Mgmt   Discharge diagnosis COPD Exacerbation with severe broncho spasm -  IV Steroid, nebs and O2 PRN, Pulmonary consulted   Does the patient have one of the following disease processes/diagnoses(primary or secondary)? COPD/Pneumonia   Does the patient have Home health ordered? No   Is there a DME ordered? No   Prep survey completed? Yes          ANDREINA VOSS - Registered Nurse

## 2022-06-14 NOTE — DISCHARGE SUMMARY
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          Hospital Course  Patient is a 56 y.o. female presented with respiratory failure and PNA at [facility], with treatment pt continued to stay sick, so pt is transferred to [facility] for further evaluation and treatment, admitted and started on IV Abx, Steroid, O2 and Nebs, pt slowly improved, was able to wean her off O2 supplement, and her weakness improved, all consultants cleared her to go home with home physical therapy, her K+ will be followed as an out pt        Procedures Performed         Consults:   Consults     Date and Time Order Name Status Description    6/7/2022  6:21 PM Inpatient Pulmonology Consult Completed           Pertinent Test Results:  Lab Results (last 24 hours)     Procedure Component Value Units Date/Time    Blood Culture - Blood, Arm, Left [[id number]]  (Normal) Collected: 06/08/22 0348    Specimen: Blood from Arm, Left Updated: 06/13/22 0432     Blood Culture No growth at 5 days    Comprehensive Metabolic Panel [[id number]]  (Abnormal) Collected: 06/13/22 0200    Specimen: Blood Updated: 06/13/22 0253     Glucose 161 mg/dL      BUN 19 mg/dL      Creatinine 0.68 mg/dL      Sodium 143 mmol/L      Potassium 3.4 mmol/L      Comment: Slight hemolysis detected by analyzer. Results may be affected.        Chloride  101 mmol/L      CO2 31.0 mmol/L      Calcium 8.5 mg/dL      Total Protein 5.3 g/dL      Albumin 3.10 g/dL      ALT (SGPT) 34 U/L      AST (SGOT) 17 U/L      Comment: Slight hemolysis detected by analyzer. Results may be affected.        Alkaline Phosphatase 66 U/L      Total Bilirubin <0.2 mg/dL      Globulin 2.2 gm/dL      A/G Ratio 1.4 g/dL      BUN/Creatinine Ratio 27.9     Anion Gap 11.0 mmol/L      eGFR 101.1 mL/min/1.73      Comment: National Kidney Foundation and American Society of Nephrology (ASN) Task Force recommended calculation based on the Chronic Kidney Disease Epidemiology Collaboration (CKD-EPI) equation refit without adjustment for race.       Narrative:      GFR Normal >60  Chronic Kidney Disease <60  Kidney Failure <15      Magnesium [[id number]]  (Normal) Collected: 06/13/22 0200    Specimen: Blood Updated: 06/13/22 0244     Magnesium 2.0 mg/dL     Blood Culture - Blood, Arm, Right [[id number]]  (Normal) Collected: 06/07/22 2101    Specimen: Blood from Arm, Right Updated: 06/12/22 2132     Blood Culture No growth at 5 days    Potassium [[id number]]  (Normal) Collected: 06/12/22 1849    Specimen: Blood Updated: 06/12/22 1944     Potassium 4.0 mmol/L      Comment: Slight hemolysis detected by analyzer. Results may be affected.            I have reviewed lab results.    Condition on Discharge:  Improved     Vital Signs  Temp:  [98.1 °F (36.7 °C)] 98.1 °F (36.7 °C)  Heart Rate:  [78-89] 89  Resp:  [18] 18  BP: (126)/(84) 126/84    Physical Exam:     General Appearance:    Alert, cooperative, in no acute distress   Ears:    Ears appear intact with no abnormalities noted   Throat:   No oral lesions, no thrush, oral mucosa moist   Neck:   No adenopathy, supple, trachea midline, no thyromegaly, no   carotid bruit, no JVD   Lungs:     Clear to auscultation,respirations regular, even and                  Unlabored     Heart:    Regular rhythm and normal rate, normal S1 and S2, no            murmur, no  gallop, no rub, no click   Abdomen:     Normal bowel sounds, no masses, no organomegaly, soft        non-tender, non-distended, no guarding, no rebound                tenderness   Extremities:   Moves all extremities well, no edema, no cyanosis, no             redness   Skin:   No bleeding, bruising or rash   Neurologic:   Cranial nerves 2 - 12 grossly intact, sensation intact, DTR       present and equal bilaterally       Discharge Disposition  Home or Self Care    Discharge Medications     Discharge Medications      New Medications      Instructions Start Date   nicotine 21 MG/24HR patch  Commonly known as: NICODERM CQ   Place 1 patch on the skin as directed by provider Daily      predniSONE 20 MG tablet  Commonly known as: DELTASONE   20 mg, Oral, Daily      predniSONE 10 MG tablet  Commonly known as: DELTASONE   Take 2 tablets by mouth once daily for 2 days then 1 tablet by mouth once daily for 2 days.   Start Date: Fannie 15, 2022        Continue These Medications      Instructions Start Date   albuterol sulfate  (90 Base) MCG/ACT inhaler  Commonly known as: PROVENTIL HFA;VENTOLIN HFA;PROAIR HFA   2 puffs, Inhalation, Every 4 Hours PRN      budesonide-formoterol 160-4.5 MCG/ACT inhaler  Commonly known as: SYMBICORT   2 puffs, Inhalation, 2 Times Daily - RT      calcium carbonate-vitamin d 600-400 MG-UNIT per tablet   1 tablet, Oral, Daily      cholecalciferol 25 MCG (1000 UT) tablet  Commonly known as: VITAMIN D3   1,000 Units, Oral, Daily      gabapentin 600 MG tablet  Commonly known as: NEURONTIN   600 mg, Oral, 3 Times Daily      HYDROcodone-acetaminophen  MG per tablet  Commonly known as: NORCO   1 tablet, Oral, Every 6 Hours PRN      ipratropium-albuterol 0.5-2.5 mg/3 ml nebulizer  Commonly known as: DUO-NEB   3 mL, Nebulization, Every 6 Hours PRN             Discharge Diet:   Diet Instructions     Diet: Regular      Discharge Diet: Regular          Activity at Discharge:     Follow-up  Appointments  No future appointments.  Additional Instructions for the Follow-ups that You Need to Schedule     Call MD With Problems / Concerns   As directed      Instructions: Called [phone] if fever greater then 101, nausea/vomiting, chest pain, shortness of breath or abdominal pain    Order Comments: Instructions: Called [phone] if fever greater then 101, nausea/vomiting, chest pain, shortness of breath or abdominal pain          Discharge Follow-up with PCP   As directed       Currently Documented PCP:    Park Dubose MD    PCP Phone Number:    [phone]     Follow Up Details: Hospital Follow up appointment with Dr. Dubose on Wednesday 6/15/2022 1215 pm.   Office number [phone]               Test Results Pending at Discharge       Park Dubose MD  06/13/22  21:40 EDT

## 2022-06-15 ENCOUNTER — READMISSION MANAGEMENT (OUTPATIENT)
Dept: CALL CENTER | Facility: HOSPITAL | Age: 57
End: 2022-06-15

## 2022-06-15 NOTE — OUTREACH NOTE
"COPD/PN Week 1 Survey    Flowsheet Row Responses   [facility] patient discharged from? [facility]   Does the patient have one of the following disease processes/diagnoses(primary or secondary)? COPD/Pneumonia   Was the primary reason for admission: COPD exacerbation   Week 1 attempt successful? Yes   Call start time 1829   Call end time 1835   Discharge diagnosis COPD Exacerbation with severe broncho spasm -  IV Steroid, nebs and O2 PRN, Pulmonary consulted   Meds reviewed with patient/caregiver? Yes   Is the patient having any side effects they believe may be caused by any medication additions or changes? No   Does the patient have all medications ordered at discharge? Yes   Is the patient taking all medications as directed (includes completed medication regime)? Yes   Does the patient have a primary care provider?  Yes   Does the patient have an appointment with their PCP or specialist within 7 days of discharge? Yes   Has the patient kept scheduled appointments due by today? Yes   Pulse Ox monitoring None   Psychosocial issues? No   Did the patient receive a copy of their discharge instructions? Yes   Nursing interventions Reviewed instructions with patient   What is the patient's perception of their health status since discharge? Improving   Nursing Interventions Nurse provided patient education   If the patient is a current smoker, are they able to teach back resources for cessation? Smoking cessation medications   Is the patient/caregiver able to teach back the hierarchy of who to call/visit for symptoms/problems? PCP, Specialist, Home health nurse, Urgent Care, ED, 911 Yes   Additional teach back comments States she is doing \"ok\".  She has some soa but believes it is due to the heat.  She only has an air conditioning unit in her bedroom.  She wants to know if insurance will pay for a pulse ox and advised to contact her insurance company.  She had follow up today with her dr.    Is the patient able to teach " back COPD zones? Yes   Nursing interventions Education provided on various zones   Patient reports what zone on this call? Yellow Zone   Yellow Zone Increased shortness of air, Unable to complete daily activities, Using quick relief inhaler/nebulizer more often   Yellow interventions Continue to use daily medications, Use quick relief inhaler as ordered, Do not smoke, Get plenty of rest, Call provider immediatly if symptoms do not improve   Week 1 call completed? Yes   Wrap up additional comments Questions answered and will discuss coverage of pulse ox with insurance company          TREE HERNANDEZ - Licensed Nurse

## 2022-06-17 ENCOUNTER — TELEPHONE (OUTPATIENT)
Dept: RADIATION ONCOLOGY | Facility: HOSPITAL | Age: 57
End: 2022-06-17

## 2022-06-22 ENCOUNTER — READMISSION MANAGEMENT (OUTPATIENT)
Dept: CALL CENTER | Facility: HOSPITAL | Age: 57
End: 2022-06-22

## 2022-06-22 NOTE — OUTREACH NOTE
COPD/PN Week 2 Survey    Flowsheet Row Responses   [facility] [place] patient discharged from? [facility]   Does the patient have one of the following disease processes/diagnoses(primary or secondary)? COPD/Pneumonia   Was the primary reason for admission: COPD exacerbation   Week 2 attempt successful? Yes   Call start time 1734   Call end time 1737   Discharge diagnosis COPD Exacerbation with severe broncho spasm -  IV Steroid, nebs and O2 PRN, Pulmonary consulted   Meds reviewed with patient/caregiver? Yes   Is the patient having any side effects they believe may be caused by any medication additions or changes? No   Does the patient have all medications ordered at discharge? No   What is keeping the patient from filling the prescriptions? --  [did not get nicoderm patch yet]   Nursing Interventions No intervention needed   Is the patient taking all medications as directed (includes completed medication regime)? Yes   Does the patient have a primary care provider?  Yes   Does the patient have an appointment with their PCP or specialist within 7 days of discharge? Yes   Has the patient kept scheduled appointments due by today? Yes   Has home health visited the patient within 72 hours of discharge? N/A   Pulse Ox monitoring None   Psychosocial issues? No   Did the patient receive a copy of their discharge instructions? Yes   Nursing interventions Reviewed instructions with patient   What is the patient's perception of their health status since discharge? Improving   Nursing Interventions Nurse provided patient education   Are the patient's immunizations up to date?  No  [needs pneumonia shot]   Is the patient/caregiver able to teach back the hierarchy of who to call/visit for symptoms/problems? PCP, Specialist, Home health nurse, Urgent Care, ED, 911 Yes   Is the patient able to teach back COPD zones? Yes   Patient reports what zone on this call? Green Zone   Green Zone Reports doing well, Breathing without shortness of  breath, Usual activity and exercise level, Usual amount of phlegm/mucus without difficulty coughing up, Sleeping well, Appetite is good   Green Zone interventions: Take daily medications, Use oxygen as prescribed, Avoid indoor/outdoor triggers, Continue regular exercise/diet plan   Week 2 call completed? Yes          SEVERIANO JURADO - Registered Nurse

## 2022-06-30 ENCOUNTER — READMISSION MANAGEMENT (OUTPATIENT)
Dept: CALL CENTER | Facility: HOSPITAL | Age: 57
End: 2022-06-30

## 2022-06-30 NOTE — OUTREACH NOTE
COPD/PN Week 3 Survey    Flowsheet Row Responses   [facility]   Does the patient have one of the following disease processes/diagnoses(primary or secondary)? COPD/Pneumonia   Was the primary reason for admission: COPD exacerbation   Week 3 attempt successful? No   Unsuccessful attempts Attempt 1          LIDNA ROMERO - Registered Nurse

## 2022-07-06 ENCOUNTER — TELEPHONE (OUTPATIENT)
Dept: RADIATION ONCOLOGY | Facility: HOSPITAL | Age: 57
End: 2022-07-06

## 2022-07-11 ENCOUNTER — READMISSION MANAGEMENT (OUTPATIENT)
Dept: CALL CENTER | Facility: HOSPITAL | Age: 57
End: 2022-07-11

## 2022-07-11 NOTE — OUTREACH NOTE
COPD/PN Week 4 Survey    Flowsheet Row Responses   [facility]? [facility]   Does the patient have one of the following disease processes/diagnoses(primary or secondary)? COPD/Pneumonia   Was the primary reason for admission: COPD exacerbation   Week 4 attempt successful? No          LYSSA H - Registered Nurse

## 2022-07-12 ENCOUNTER — TELEPHONE (OUTPATIENT)
Dept: RADIATION ONCOLOGY | Facility: HOSPITAL | Age: 57
End: 2022-07-12

## 2022-07-12 ENCOUNTER — HOSPITAL ENCOUNTER (OUTPATIENT)
Dept: RADIATION ONCOLOGY | Facility: HOSPITAL | Age: 57
Setting detail: RADIATION/ONCOLOGY SERIES
End: 2022-07-12

## 2022-07-12 NOTE — TELEPHONE ENCOUNTER
No answering machine to leave a message. Calling to give a reminder of his appt with Dr Glover on 7/13/22

## 2023-01-03 ENCOUNTER — TELEPHONE (OUTPATIENT)
Dept: RADIATION ONCOLOGY | Facility: HOSPITAL | Age: 58
End: 2023-01-03
Payer: MEDICARE

## 2023-01-03 ENCOUNTER — HOSPITAL ENCOUNTER (OUTPATIENT)
Dept: RADIATION ONCOLOGY | Facility: HOSPITAL | Age: 58
Setting detail: RADIATION/ONCOLOGY SERIES
End: 2023-01-03
Payer: MEDICARE

## 2023-01-03 NOTE — TELEPHONE ENCOUNTER
Mailbox is full. No message was left. Calling to give a reminder for her follow up appt with dr uribe on 1/4/23

## 2023-01-04 ENCOUNTER — APPOINTMENT (OUTPATIENT)
Dept: RADIATION ONCOLOGY | Facility: HOSPITAL | Age: 58
End: 2023-01-04
Payer: MEDICARE

## 2023-01-10 ENCOUNTER — TELEPHONE (OUTPATIENT)
Dept: RADIATION ONCOLOGY | Facility: HOSPITAL | Age: 58
End: 2023-01-10
Payer: MEDICARE

## 2023-01-11 ENCOUNTER — OFFICE VISIT (OUTPATIENT)
Dept: RADIATION ONCOLOGY | Facility: HOSPITAL | Age: 58
End: 2023-01-11
Payer: MEDICARE

## 2023-01-11 VITALS
BODY MASS INDEX: 30.43 KG/M2 | WEIGHT: 200.8 LBS | SYSTOLIC BLOOD PRESSURE: 137 MMHG | TEMPERATURE: 98.3 F | OXYGEN SATURATION: 98 % | RESPIRATION RATE: 18 BRPM | HEIGHT: 68 IN | HEART RATE: 88 BPM | DIASTOLIC BLOOD PRESSURE: 85 MMHG

## 2023-01-11 DIAGNOSIS — C54.9 MALIGNANT NEOPLASM OF CORPUS UTERI, UNSPECIFIED: ICD-10-CM

## 2023-01-11 DIAGNOSIS — C55 MALIGNANT NEOPLASM OF UTERUS, UNSPECIFIED SITE: Primary | ICD-10-CM

## 2023-01-11 PROCEDURE — 99214 OFFICE O/P EST MOD 30 MIN: CPT | Performed by: RADIOLOGY

## 2023-01-11 PROCEDURE — G0463 HOSPITAL OUTPT CLINIC VISIT: HCPCS | Performed by: RADIOLOGY

## 2023-01-11 NOTE — PROGRESS NOTES
"  RADIATION ONCOLOGY FOLLOW-UP NOTE    NAME: Xin Nj  YOB: 1965  MRN #: [id number]  DATE OF SERVICE: 1/11/2023  REFERRING PROVIDER: Park Dubose MD  [address] [place] [address]  PRIMARY CARE PROVIDER: Park Dubose MD    DIAGNOSIS:  Carcinosarcoma of the uterus, oX2vT1C1, FIGO IB  1. Malignant neoplasm of uterus, unspecified site (HCC)      REASON FOR VISIT:  Uterine carcinosarcoma, f/u    RADIATION TREATMENT COURSE: 50.4 Gy in 28 fractions to pelvis, completed 07/13/2021    HISTORY OF PRESENT ILLNESS: The patient is a 57 y.o. year old female who was last seen in our office over one year ago and has not been following with Dr. Neal or Gyn/onc.    We discussed the importance of interval exams, labs and imaging.    She has not been having any gynecologic symptoms or concerns. She does report a L pelvic pain that is infrequent but she cannot \"get over\". She denies any weight loss, pelvic pain, vaginal bleeding.  She denies cough.        The following portions of the patient's history were reviewed and updated as appropriate: allergies, current medications, past family history, past medical history, past social history, past surgical history and problem list. Reviewed with the patient and remain unchanged.    PAST MEDICAL HISTORY:  she has a past medical history of Anxiety, COPD (chronic obstructive pulmonary disease) (CMS/HCC), COPD (chronic obstructive pulmonary disease) (CMS/HCC), Depression, Encounter for antineoplastic radiation therapy, Endometrial cancer (CMS/[facility]), History of right breast cancer, and Osteoporosis.    MEDICATIONS:    Current Outpatient Medications:   •  albuterol sulfate  (90 Base) MCG/ACT inhaler, Inhale 2 puffs Every 4 (Four) Hours As Needed., Disp: , Rfl:   •  budesonide-formoterol (SYMBICORT) 160-4.5 MCG/ACT inhaler, Inhale 2 puffs 2 (Two) Times a Day., Disp: , Rfl:   •  calcium carbonate-vitamin d 600-400 MG-UNIT per tablet, Take " 1 tablet by mouth Daily., Disp: , Rfl:   •  cholecalciferol (VITAMIN D3) 25 MCG (1000 UT) tablet, Take 1,000 Units by mouth Daily., Disp: , Rfl:   •  gabapentin (NEURONTIN) 600 MG tablet, Take 600 mg by mouth 3 (Three) Times a Day., Disp: , Rfl:   •  HYDROcodone-acetaminophen (NORCO)  MG per tablet, Take 1 tablet by mouth Every 6 (Six) Hours As Needed for Moderate Pain ., Disp: 28 tablet, Rfl: 0  •  ipratropium-albuterol (DUO-NEB) 0.5-2.5 mg/3 ml nebulizer, Take 3 mL by nebulization Every 6 (Six) Hours As Needed for Wheezing., Disp: , Rfl:   •  nicotine (NICODERM CQ) 21 MG/24HR patch, Place 1 patch on the skin as directed by provider Daily, Disp: 14 patch, Rfl: 0  •  predniSONE (DELTASONE) 10 MG tablet, Take 2 tablets by mouth once daily for 2 days then 1 tablet by mouth once daily for 2 days., Disp: 6 tablet, Rfl: 0    ALLERGIES:  No Known Allergies    PAST SURGICAL HISTORY:  she has a past surgical history that includes Breast lumpectomy (Right) and Hysterectomy (2020).    PREVIOUS RADIOTHERAPY OR CHEMOTHERAPY:  yes    FAMILY HISTORY:  herfamily history includes Lung cancer in her brother; Stroke in her mother.    SOCIAL HISTORY:  she reports that she has been smoking. She has never used smokeless tobacco. She reports that she does not currently use alcohol. She reports that she does not use drugs.    PAIN AND PAIN MANAGEMENT:  Denies pain.    NUTRITIONAL STATUS:   no issues    KPS:  90:  Minor signs or symptoms      REVIEW OF SYSTEMS:     General: No fevers, chills, weight change, or drenching night sweats. Skin: No rashes or jaundice.  HEENT: No change in vision or hearing, no headaches.  Neck: No dysphagia or masses.  Heme/Lymph: No easy bruising or bleeding.  Respiratory System: No shortness of breath or cough.  Cardiovascular: No chest pain, palpitations, or dyspnea on exertion.  - Pacemaker. GI: No nausea, vomiting, diarrhea, melena, or hematochezia.  : No dysuria or hematuria.  Endocrine: No heat or  cold intolerance. Musculoskeletal: No myalgias or arthralgias.  Neuro: No weakness, numbness, syncope, or seizures. Psych: No mood changes or depression. Ext: Denies swelling.        Objective   VITAL SIGNS:  Vitals:    01/11/23 1331   BP: 137/85   Pulse: 88   Resp: 18   Temp: 98.3 °F (36.8 °C)   SpO2: 98%         PHYSICAL EXAM:  GENERAL: In no apparent distress, sitting comfortably in room.    HEENT: Normocephalic, atraumatic. Pupils are equal, round, reactive to light. Sclera anicteric. Conjunctiva not injected. Oropharynx without erythema, ulcerations or thrush.   NECK: Supple with no masses.  LYMPHATIC: No cervical, supraclavicular or axillary adenopathy appreciated bilaterally.   CARDIOVASCULAR: S1 & S2 detected; no murmurs, rubs or gallops.  CHEST: Clear to auscultation bilaterally; no wheezes, crackles or rubs. Work of breathing normal.  ABDOMEN: Bowel sounds present. Abdomen is soft, nontender, nondistended.   MUSCULOSKELETAL: No tenderness to palpation along the spine or scapulae. Normal range of motion.  EXTREMITIES: No clubbing, cyanosis, edema.  SKIN: No erythema, rashes, ulcerations noted.   NEUROLOGIC: Cranial nerves II-XII grossly intact bilaterally. No focal neurologic deficits.  PSYCHIATRIC:  Alert, aware, and appropriate.  GYN/RECTAL: No evidence of recurrence on exam.    PERTINENT IMAGING/PATHOLOGY/LABS (Medical Decision Making):     COORDINATION OF CARE: A copy of this note is sent to the referring provider.    PATHOLOGY (Reviewed):     IMAGING (Reviewed):     LABS (Reviewed):  HEMATOLOGY:  WBC   Date Value Ref Range Status   06/09/2022 5.40 3.40 - 10.80 10*3/mm3 Final     RBC   Date Value Ref Range Status   06/09/2022 3.92 3.77 - 5.28 10*6/mm3 Final     Hemoglobin   Date Value Ref Range Status   06/09/2022 12.1 12.0 - 15.9 g/dL Final     Hematocrit   Date Value Ref Range Status   06/09/2022 37.1 34.0 - 46.6 % Final     Platelets   Date Value Ref Range Status   06/09/2022 170 140 - 450 10*3/mm3  Final     CHEMISTRY:  Lab Results   Component Value Date    GLUCOSE 161 (H) 06/13/2022    BUN 19 06/13/2022    CREATININE 0.68 06/13/2022    EGFRIFNONA 86 04/30/2021    BCR 27.9 (H) 06/13/2022    K 3.4 (L) 06/13/2022    CO2 31.0 (H) 06/13/2022    CALCIUM 8.5 (L) 06/13/2022    ALBUMIN 3.10 (L) 06/13/2022    AST 17 06/13/2022    ALT 34 (H) 06/13/2022       Assessment & Plan   ASSESSMENT AND PLAN:    1. Malignant neoplasm of uterus, unspecified site (HCC)       --Patient and I discussed importance of routine surveillance/exams/labs and imaging as per NCCN guidelines.    I am ordering CBC, CMP, CA-125, CT chest/abd/pelvis  -Reviewed her LLQ area on exam and no findings.  Will do imaging of this area.    FU here scheduled for 7/12/2023.     Will monitor the results of the above as they result over the next two weeks.  Notified Dr. Neal's clinic of need to reschedule as well.    This assessment comes from my review of the imaging, pathology, physician notes and other pertinent information as mentioned.          TIME SPENT WITH PATIENT:   I spent 30 minutes caring for Xin on this date of service. This time includes time spent by me in the following activities: preparing for the visit, reviewing tests, obtaining and/or reviewing a separately obtained history, performing a medically appropriate examination and/or evaluation, counseling and educating the patient/family/caregiver, ordering medications, tests, or procedures, referring and communicating with other health care professionals, documenting information in the medical record, independently interpreting results and communicating that information with the patient/family/caregiver and care coordination      CC: Juliana Neal MD    Approved by:     Kristofer Glover MD

## 2023-01-23 ENCOUNTER — HOSPITAL ENCOUNTER (OUTPATIENT)
Dept: PET IMAGING | Facility: HOSPITAL | Age: 58
Discharge: HOME | End: 2023-01-23
Admitting: RADIOLOGY
Payer: MEDICARE

## 2023-01-23 DIAGNOSIS — C54.9 MALIGNANT NEOPLASM OF CORPUS UTERI, UNSPECIFIED: ICD-10-CM

## 2023-01-23 DIAGNOSIS — C55 MALIGNANT NEOPLASM OF UTERUS, UNSPECIFIED SITE: ICD-10-CM

## 2023-01-23 LAB
CREAT BLDA-MCNC: 0.7 MG/DL (ref 0.6–1.3)
EGFRCR SERPLBLD CKD-EPI 2021: 99.8 ML/MIN/1.73

## 2023-01-23 PROCEDURE — 82565 ASSAY OF CREATININE: CPT

## 2023-01-23 PROCEDURE — 71260 CT THORAX DX C+: CPT

## 2023-01-23 PROCEDURE — 74177 CT ABD & PELVIS W/CONTRAST: CPT

## 2023-01-23 PROCEDURE — 0 IOPAMIDOL PER 1 ML: Performed by: RADIOLOGY

## 2023-01-23 RX ADMIN — IOPAMIDOL 100 ML: 755 INJECTION, SOLUTION INTRAVENOUS at 15:03

## 2023-03-03 ENCOUNTER — TELEPHONE (OUTPATIENT)
Dept: RADIATION ONCOLOGY | Facility: HOSPITAL | Age: 58
End: 2023-03-03
Payer: MEDICARE

## 2023-03-03 NOTE — TELEPHONE ENCOUNTER
Radiation Oncology Nurse Note    Called and LVM requesting patient complete ordered labs from Dr. Glover at her earliest convenience at either a [facility] PCP office, the hospital, or here at our cancer center. Provided return call back number for questions.    Fela Garcia RN, BSN  Radiation Oncology Department  [facility]  [phone]  Office  [phone]  Fax

## 2023-04-14 NOTE — NURSING NOTE
Pt brought to IR dept and was consented for infusaport contrast study after being educated on procedure and voicing understanding and consent. Pt was brought to IR dept via stretcher, on room air, with face mask in place. IR RN is wearing face mask and eye shield. Pt is alert and oriented x3. Pt skin is flesh, warm, and dry. Pt is somewhat anxious, but cooperative.  Pt is on heart, blood pressure, and oxygen sat monitoring.    No

## 2023-06-14 ENCOUNTER — TELEPHONE (OUTPATIENT)
Dept: RADIATION ONCOLOGY | Facility: HOSPITAL | Age: 58
End: 2023-06-14
Payer: MEDICARE

## 2023-06-19 ENCOUNTER — TELEPHONE (OUTPATIENT)
Dept: RADIATION ONCOLOGY | Facility: HOSPITAL | Age: 58
End: 2023-06-19
Payer: MEDICARE

## 2023-06-19 NOTE — TELEPHONE ENCOUNTER
I have made several attempts calling to reschedule her follow up appt with Dr Glover. Dr Glover will be out of the office on 7/12/23.

## 2023-08-01 ENCOUNTER — HOSPITAL ENCOUNTER (OUTPATIENT)
Dept: RADIATION ONCOLOGY | Facility: HOSPITAL | Age: 58
Setting detail: RADIATION/ONCOLOGY SERIES
End: 2023-08-01
Payer: MEDICARE

## 2023-08-03 ENCOUNTER — APPOINTMENT (OUTPATIENT)
Dept: RADIATION ONCOLOGY | Facility: HOSPITAL | Age: 58
End: 2023-08-03
Payer: MEDICARE

## 2023-08-11 ENCOUNTER — APPOINTMENT (OUTPATIENT)
Dept: RADIATION ONCOLOGY | Facility: HOSPITAL | Age: 58
End: 2023-08-11
Payer: MEDICARE

## 2023-08-17 ENCOUNTER — OFFICE VISIT (OUTPATIENT)
Dept: RADIATION ONCOLOGY | Facility: HOSPITAL | Age: 58
End: 2023-08-17
Payer: MEDICARE

## 2023-08-17 ENCOUNTER — LAB (OUTPATIENT)
Dept: LAB | Facility: HOSPITAL | Age: 58
End: 2023-08-17
Payer: MEDICARE

## 2023-08-17 VITALS
HEART RATE: 83 BPM | DIASTOLIC BLOOD PRESSURE: 80 MMHG | WEIGHT: 194.2 LBS | RESPIRATION RATE: 17 BRPM | OXYGEN SATURATION: 96 % | BODY MASS INDEX: 29.43 KG/M2 | HEIGHT: 68 IN | TEMPERATURE: 98.5 F | SYSTOLIC BLOOD PRESSURE: 119 MMHG

## 2023-08-17 DIAGNOSIS — C54.9 MALIGNANT NEOPLASM OF CORPUS UTERI, UNSPECIFIED: ICD-10-CM

## 2023-08-17 DIAGNOSIS — C54.1 MALIGNANT NEOPLASM OF ENDOMETRIUM: Primary | ICD-10-CM

## 2023-08-17 DIAGNOSIS — C55 MALIGNANT NEOPLASM OF UTERUS, UNSPECIFIED SITE: ICD-10-CM

## 2023-08-17 LAB
ALBUMIN SERPL-MCNC: 3.7 G/DL (ref 3.5–5.2)
ALBUMIN/GLOB SERPL: 1.3 G/DL
ALP SERPL-CCNC: 105 U/L (ref 39–117)
ALT SERPL W P-5'-P-CCNC: 10 U/L (ref 1–33)
ANION GAP SERPL CALCULATED.3IONS-SCNC: 11 MMOL/L (ref 5–15)
AST SERPL-CCNC: 15 U/L (ref 1–32)
BASOPHILS # BLD AUTO: 0.03 10*3/MM3 (ref 0–0.2)
BASOPHILS NFR BLD AUTO: 0.4 % (ref 0–1.5)
BILIRUB SERPL-MCNC: 0.2 MG/DL (ref 0–1.2)
BUN SERPL-MCNC: 11 MG/DL (ref 6–20)
BUN/CREAT SERPL: 15.5 (ref 7–25)
CALCIUM SPEC-SCNC: 9.1 MG/DL (ref 8.6–10.5)
CHLORIDE SERPL-SCNC: 102 MMOL/L (ref 98–107)
CO2 SERPL-SCNC: 28 MMOL/L (ref 22–29)
CREAT SERPL-MCNC: 0.71 MG/DL (ref 0.57–1)
DEPRECATED RDW RBC AUTO: 46.7 FL (ref 37–54)
EGFRCR SERPLBLD CKD-EPI 2021: 98.1 ML/MIN/1.73
EOSINOPHIL # BLD AUTO: 0.24 10*3/MM3 (ref 0–0.4)
EOSINOPHIL NFR BLD AUTO: 3.4 % (ref 0.3–6.2)
ERYTHROCYTE [DISTWIDTH] IN BLOOD BY AUTOMATED COUNT: 12.9 % (ref 12.3–15.4)
GLOBULIN UR ELPH-MCNC: 2.9 GM/DL
GLUCOSE SERPL-MCNC: 106 MG/DL (ref 65–99)
HCT VFR BLD AUTO: 41.3 % (ref 34–46.6)
HGB BLD-MCNC: 13.1 G/DL (ref 12–15.9)
LYMPHOCYTES # BLD AUTO: 1.27 10*3/MM3 (ref 0.7–3.1)
LYMPHOCYTES NFR BLD AUTO: 18.1 % (ref 19.6–45.3)
MCH RBC QN AUTO: 31.5 PG (ref 26.6–33)
MCHC RBC AUTO-ENTMCNC: 31.7 G/DL (ref 31.5–35.7)
MCV RBC AUTO: 99.3 FL (ref 79–97)
MONOCYTES # BLD AUTO: 0.48 10*3/MM3 (ref 0.1–0.9)
MONOCYTES NFR BLD AUTO: 6.9 % (ref 5–12)
NEUTROPHILS NFR BLD AUTO: 4.98 10*3/MM3 (ref 1.7–7)
NEUTROPHILS NFR BLD AUTO: 71.2 % (ref 42.7–76)
PLATELET # BLD AUTO: 179 10*3/MM3 (ref 140–450)
PMV BLD AUTO: 9.1 FL (ref 6–12)
POTASSIUM SERPL-SCNC: 3.8 MMOL/L (ref 3.5–5.2)
PROT SERPL-MCNC: 6.6 G/DL (ref 6–8.5)
RBC # BLD AUTO: 4.16 10*6/MM3 (ref 3.77–5.28)
SODIUM SERPL-SCNC: 141 MMOL/L (ref 136–145)
WBC NRBC COR # BLD: 7 10*3/MM3 (ref 3.4–10.8)

## 2023-08-17 PROCEDURE — 80053 COMPREHEN METABOLIC PANEL: CPT | Performed by: RADIOLOGY

## 2023-08-17 PROCEDURE — 85025 COMPLETE CBC W/AUTO DIFF WBC: CPT

## 2023-08-17 PROCEDURE — 36415 COLL VENOUS BLD VENIPUNCTURE: CPT

## 2023-08-17 PROCEDURE — 86304 IMMUNOASSAY TUMOR CA 125: CPT | Performed by: RADIOLOGY

## 2023-08-17 PROCEDURE — G0463 HOSPITAL OUTPT CLINIC VISIT: HCPCS | Performed by: RADIOLOGY

## 2023-08-17 NOTE — PROGRESS NOTES
RADIATION ONCOLOGY FOLLOW-UP NOTE    NAME: Xin Nj  YOB: 1965  MRN #: [id number]  DATE OF SERVICE: 8/17/2023  REFERRING PROVIDER: Pakr Dubose MD  [address] [facility] [place] [address]  PRIMARY CARE PROVIDER: Park Dubose MD    DIAGNOSIS:  Carcinosarcoma of the uterus, [id number], FIGO IB    REASON FOR VISIT/CC: uterine  carcinosarcoma,  6M F/U    RADIATION TREATMENT COURSE:  50.4 Gy in 28 fractions to pelvis, completed 07/13/2021.    HISTORY OF PRESENT ILLNESS: The patient is a 57 y.o. year old female who was last seen in our office on 01/11/2023. The plan was to re-establish care with Dr. Neal's office, have CT CAP and labs completed, and follow up here in July 2023.    She has not seen Dr. Neal, nor was she scheduled by her team.    She did not get labs we had ordered at last follow-up.    She denies any weight loss, pelvic pain, vaginal bleeding. She denies cough.     She did have CT CAP on 01/23/2023-- No definite findings of metastatic disease on the chest component with calcific atherosclerosis of the coronary arteries noted.  In the abdomen, scleroris of the L5 and S1 appears mildly increased without a well-defined lesion visible at this time. This was felt a likely sequelae of prior radiation.  No other well-defined lesion is seen to favor osseous metastatic disease.  No definite findings of metastatic disease in the abdomen or pelvis at this time.  Stable hepatic lesion, suspect a hemangioma.  Fatty infiltration of the liver.    She denies new back pain.    The following portions of the patient's history were reviewed and updated as appropriate: allergies, current medications, past family history, past medical history, past social history, past surgical history and problem list. Reviewed with the patient and remain unchanged.    PAST MEDICAL HISTORY:  she has a past medical history of Anxiety, COPD (chronic obstructive pulmonary disease), COPD (chronic  obstructive pulmonary disease), Depression, Encounter for antineoplastic radiation therapy, Endometrial cancer, History of right breast cancer, and Osteoporosis.    MEDICATIONS:    Current Outpatient Medications:     albuterol sulfate  (90 Base) MCG/ACT inhaler, Inhale 2 puffs Every 4 (Four) Hours As Needed., Disp: , Rfl:     Budeson-Glycopyrrol-Formoterol (BREZTRI) 160-9-4.8 MCG/ACT aerosol inhaler, Inhale 2 puffs 2 (Two) Times a Day., Disp: , Rfl:     budesonide-formoterol (SYMBICORT) 160-4.5 MCG/ACT inhaler, Inhale 2 puffs 2 (Two) Times a Day., Disp: , Rfl:     calcium carbonate-vitamin d 600-400 MG-UNIT per tablet, Take 1 tablet by mouth Daily., Disp: , Rfl:     cholecalciferol (VITAMIN D3) 25 MCG (1000 UT) tablet, Take 1,000 Units by mouth Daily., Disp: , Rfl:     gabapentin (NEURONTIN) 600 MG tablet, Take 600 mg by mouth 3 (Three) Times a Day., Disp: , Rfl:     HYDROcodone-acetaminophen (NORCO)  MG per tablet, Take 1 tablet by mouth Every 6 (Six) Hours As Needed for Moderate Pain ., Disp: 28 tablet, Rfl: 0    ipratropium-albuterol (DUO-NEB) 0.5-2.5 mg/3 ml nebulizer, Take 3 mL by nebulization Every 6 (Six) Hours As Needed for Wheezing., Disp: , Rfl:     nicotine (NICODERM CQ) 21 MG/24HR patch, Place 1 patch on the skin as directed by provider Daily, Disp: 14 patch, Rfl: 0    predniSONE (DELTASONE) 10 MG tablet, Take 2 tablets by mouth once daily for 2 days then 1 tablet by mouth once daily for 2 days., Disp: 6 tablet, Rfl: 0    ALLERGIES:  No Known Allergies    PAST SURGICAL HISTORY:  she has a past surgical history that includes Breast lumpectomy (Right) and Hysterectomy (2020).    PREVIOUS RADIOTHERAPY OR CHEMOTHERAPY:  XRT as noted.    FAMILY HISTORY:  herfamily history includes Lung cancer in her brother; Stroke in her mother.    SOCIAL HISTORY:  she reports that she has been smoking. She has never used smokeless tobacco. She reports that she does not currently use alcohol. She reports that  she does not use drugs.    PAIN AND PAIN MANAGEMENT:  Xin Nj reports a pain score of 7.  Given her pain assessment as noted, treatment options were discussed and the following options were decided upon as a follow-up plan to address the patient's pain: continuation of current treatment plan for pain.      NUTRITIONAL STATUS:   no issues    KPS:  90:  Minor signs or symptoms      REVIEW OF SYSTEMS:   General: No fevers, chills, weight change, or drenching night sweats. Skin: No rashes or jaundice.  HEENT: No change in vision or hearing, no headaches.  Neck: No dysphagia or masses.  Heme/Lymph: No easy bruising or bleeding.  Respiratory System: No shortness of breath or cough.  Cardiovascular: No chest pain, palpitations, or dyspnea on exertion.  - Pacemaker. GI: No nausea, vomiting, diarrhea, melena, or hematochezia.  : No dysuria or hematuria.  Endocrine: No heat or cold intolerance. Musculoskeletal: No myalgias or arthralgias.  Neuro: No weakness, numbness, syncope, or seizures. Psych: No mood changes or depression. Ext: Denies swelling.    Objective   VITAL SIGNS:  Vitals:    08/17/23 1512   BP: 119/80   Pulse: 83   Resp: 17   Temp: 98.5 øF (36.9 øC)   SpO2: 96%       PHYSICAL EXAM:  GENERAL: In no apparent distress, sitting comfortably in room.    HEENT: Normocephalic, atraumatic. Pupils are equal, round, reactive to light. Sclera anicteric. Conjunctiva not injected. Oropharynx without erythema, ulcerations or thrush.   NECK: Supple with no masses.  LYMPHATIC: No cervical, supraclavicular or axillary adenopathy appreciated bilaterally.   CARDIOVASCULAR: S1 & S2 detected; no murmurs, rubs or gallops.  CHEST: Clear to auscultation bilaterally; no wheezes, crackles or rubs. Work of breathing normal.  ABDOMEN: Bowel sounds present. Abdomen is soft, nontender, nondistended.   MUSCULOSKELETAL: No tenderness to palpation along the spine or scapulae. Normal range of motion.  EXTREMITIES: No clubbing, cyanosis,  edema.  SKIN: No erythema, rashes, ulcerations noted.   NEUROLOGIC: Cranial nerves II-XII grossly intact bilaterally. No focal neurologic deficits.  PSYCHIATRIC:  Alert, aware, and appropriate.  GYN/Rectal: No visible or palpable abnormalities, no evidence of recurrence on exam.      PERTINENT IMAGING/PATHOLOGY/LABS (Medical Decision Making):     COORDINATION OF CARE: A copy of this note is sent to the referring provider.    PATHOLOGY (Reviewed):     IMAGING (Reviewed): as noted above    LABS (Reviewed):  HEMATOLOGY:  WBC   Date Value Ref Range Status   06/09/2022 5.40 3.40 - 10.80 10*3/mm3 Final     RBC   Date Value Ref Range Status   06/09/2022 3.92 3.77 - 5.28 10*6/mm3 Final     Hemoglobin   Date Value Ref Range Status   06/09/2022 12.1 12.0 - 15.9 g/dL Final     Hematocrit   Date Value Ref Range Status   06/09/2022 37.1 34.0 - 46.6 % Final     Platelets   Date Value Ref Range Status   06/09/2022 170 140 - 450 10*3/mm3 Final     CHEMISTRY:  Lab Results   Component Value Date    GLUCOSE 161 (H) 06/13/2022    BUN 19 06/13/2022    CREATININE 0.70 01/23/2023    EGFRIFNONA 86 04/30/2021    BCR 27.9 (H) 06/13/2022    K 3.4 (L) 06/13/2022    CO2 31.0 (H) 06/13/2022    CALCIUM 8.5 (L) 06/13/2022    ALBUMIN 3.10 (L) 06/13/2022    AST 17 06/13/2022    ALT 34 (H) 06/13/2022     Assessment & Plan   ASSESSMENT AND PLAN:    Carcinosarcoma of the uterus, [id number], FIGO IB  -Now two years post XRT   -cNED by exam  -Needs CBC, CMP, CA-125.  The LLQ area of pain seen on exam 1/2023 did not correlate with soft tissue findings on 1/2023 scans.  There sclerosis L5/S1 that needs to be followed on interval scan.        This assessment comes from my review of the imaging, pathology, physician notes and other pertinent information as mentioned.    DISPOSITION: Labs now  Repeat CT scans of abd/pelvis to look at abnormal areas on last CT scan.  6 month f/u planned.    TIME SPENT WITH PATIENT:   I spent 25 minutes caring for Xin on this  date of service. This time includes time spent by me in the following activities: preparing for the visit, reviewing tests, obtaining and/or reviewing a separately obtained history, performing a medically appropriate examination and/or evaluation, counseling and educating the patient/family/caregiver, ordering medications, tests, or procedures, documenting information in the medical record, independently interpreting results and communicating that information with the patient/family/caregiver, and care coordination    CC: MD Park Urrutia MD      Approved by:     Kristofer Glover MD      Addendum: Labs were normal on CMP, CBC.   normal range  CT ordered.

## 2023-08-18 LAB — CANCER AG125 SERPL QL: 8.1 U/ML (ref 0–38.1)

## 2023-09-20 ENCOUNTER — HOSPITAL ENCOUNTER (OUTPATIENT)
Dept: PET IMAGING | Facility: HOSPITAL | Age: 58
Discharge: HOME | End: 2023-09-20
Payer: MEDICARE

## 2024-01-09 ENCOUNTER — TELEPHONE (OUTPATIENT)
Dept: RADIATION ONCOLOGY | Facility: HOSPITAL | Age: 59
End: 2024-01-09
Payer: MEDICARE

## 2024-01-29 ENCOUNTER — TELEPHONE (OUTPATIENT)
Dept: RADIATION ONCOLOGY | Facility: HOSPITAL | Age: 59
End: 2024-01-29
Payer: MEDICARE

## 2024-01-29 NOTE — TELEPHONE ENCOUNTER
Left detailed message with a return phone number. Calling to schedule a ct scan before her follow up appt with Dr Glover.

## 2024-01-30 ENCOUNTER — TELEPHONE (OUTPATIENT)
Dept: RADIATION ONCOLOGY | Facility: HOSPITAL | Age: 59
End: 2024-01-30
Payer: MEDICARE

## 2024-01-30 NOTE — TELEPHONE ENCOUNTER
Left detailed message and return phone number. Calling to schedule a ct scan before her follow up with Dr Glover 2/15/24

## 2024-03-12 ENCOUNTER — HOSPITAL ENCOUNTER (OUTPATIENT)
Dept: PET IMAGING | Facility: HOSPITAL | Age: 59
Discharge: HOME | End: 2024-03-12
Admitting: RADIOLOGY
Payer: MEDICARE

## 2024-03-12 DIAGNOSIS — C54.1 MALIGNANT NEOPLASM OF ENDOMETRIUM: ICD-10-CM

## 2024-03-12 LAB
CREAT BLDA-MCNC: 0.7 MG/DL (ref 0.6–1.3)
EGFRCR SERPLBLD CKD-EPI 2021: 99.2 ML/MIN/1.73

## 2024-03-12 PROCEDURE — 74177 CT ABD & PELVIS W/CONTRAST: CPT

## 2024-03-12 PROCEDURE — 25510000001 IOPAMIDOL PER 1 ML: Performed by: RADIOLOGY

## 2024-03-12 PROCEDURE — 82565 ASSAY OF CREATININE: CPT

## 2024-03-12 RX ADMIN — IOPAMIDOL 100 ML: 755 INJECTION, SOLUTION INTRAVENOUS at 14:35

## 2025-02-27 DIAGNOSIS — C54.1 MALIGNANT NEOPLASM OF ENDOMETRIUM: Primary | ICD-10-CM

## 2025-03-23 NOTE — PROGRESS NOTES
[facility] RADIATION ONCOLOGY  FOLLOW-UP    Name: Xin Nj  YOB: 1965  MRN #: [id number]  Date of Service: 3/28/2025    Chief Complaint:  Follow up with interval imaging review    Diagnosis:   Encounter Diagnosis   Name Primary?    Malignant neoplasm of endometrium Yes        Radiation Treatment Course     Treating Physician: Dr. Kristofer Glover     Start: 06/01/2021     End: 07/08/2021   Site: Pelvis    Total Dose: 4500 cGy    Dose/Fraction: 180 cGy    Total Fractions: 25 Daily or BID: Daily  Modality: Photon  Technique: IMRT/VMAT/Rapid Arc  Bolus: No       Treating Physician: Dr. Kristofer Glover     Start: 07/09/2021     End: 07/13/2021   Site: Pelvis    Total Dose: 540 cGy    Dose/Fraction: 180 cGy    Total Fractions: 3 Daily or BID: Daily  Modality: Photon  Technique: IMRT/VMAT/Rapid Arc  Bolus: No       Interval History     Xin Nj is a 59 y.o. female who was diagnosed with FIGO Stage IB (pT1b, N0, M0) carcinosarcoma of the uterus in November 2020. She underwent TRH with BSO and SLNB on 11/11/2020 with Dr. Jayme Jama at [facility]. The plan was for adjuvant chemoradiation in a sandwich fashion with carboplatin/paclitaxel and EBRT. She completed 3 cycles carboplatin/paclitaxel on 4/30/2021 with Dr. Neal then went on to complete 5040 cGy in 28 fractions to the pelvis on 07/13/2021 before being lost to follow-up with medical oncology since June 2021. She has been lost to follow-up with our office since August 2023. Patient is here today for follow up and to discuss recent imaging.     3/24/2025: CT CAP with contrast showed no evidence of recurrent mass or findings worrisome for disease recurrence.     The patient reports some recent bowel pain and discomfort. She has no recent vaginal bleeding or discharge. She has not had a pelvic exam since her last appointment in our department.       Imaging     CT Chest With Contrast Diagnostic  Result Date: 3/28/2025  Impression: 1.No evidence of  metastatic disease. 2.Chronic calcific granulomatous disease. Electronically Signed: Khanh Gold MD  3/28/2025 9:33 AM EDT  Workstation ID: [id number]    CT Abdomen Pelvis With Contrast  Result Date: 3/27/2025  Impression: No acute abdominal or pelvic abnormality. No evidence of metastatic disease. Stable hepatic hemangioma. Electronically Signed: Brandon Moon MD  3/27/2025 2:22 PM EDT  Workstation ID: [id number]        Pathology     No new pathology to review.      Labs     Hematology:  WBC   Date Value Ref Range Status   08/17/2023 7.00 3.40 - 10.80 10*3/mm3 Final     RBC   Date Value Ref Range Status   08/17/2023 4.16 3.77 - 5.28 10*6/mm3 Final     Hemoglobin   Date Value Ref Range Status   08/17/2023 13.1 12.0 - 15.9 g/dL Final     Hematocrit   Date Value Ref Range Status   08/17/2023 41.3 34.0 - 46.6 % Final     Platelets   Date Value Ref Range Status   08/17/2023 179 140 - 450 10*3/mm3 Final     Chemistry:  Lab Results   Component Value Date    GLUCOSE 106 (H) 08/17/2023    BUN 11 08/17/2023    CREATININE 0.70 03/12/2024    EGFRIFNONA 86 04/30/2021    BCR 15.5 08/17/2023    K 3.8 08/17/2023    CO2 28.0 08/17/2023    CALCIUM 9.1 08/17/2023    ALBUMIN 3.7 08/17/2023    AST 15 08/17/2023    ALT 10 08/17/2023       Problem List     Patient Active Problem List   Diagnosis    Malignant neoplasm of endometrium    Chemotherapy induced neutropenia    Arthritis    Chronic obstructive pulmonary disease    Fibromyalgia    Knee pain    Low back pain    Malignant neoplasm of breast    Malignant neoplasm of uterus    Obesity    Pneumonia    COPD (chronic obstructive pulmonary disease)        Current Medications     Current Outpatient Medications   Medication Instructions    albuterol sulfate  (90 Base) MCG/ACT inhaler 2 puffs, Inhalation, Every 4 Hours PRN    Budeson-Glycopyrrol-Formoterol (BREZTRI) 160-9-4.8 MCG/ACT aerosol inhaler 2 puffs, Inhalation, 2 Times Daily    budesonide-formoterol (SYMBICORT) 160-4.5 MCG/ACT  inhaler 2 puffs, 2 Times Daily - RT    calcium carbonate-vitamin d 600-400 MG-UNIT per tablet 1 tablet, Oral, Daily    cholecalciferol (VITAMIN D3) 1,000 Units, Daily    gabapentin (NEURONTIN) 600 mg, Oral, 3 Times Daily    HYDROcodone-acetaminophen (NORCO)  MG per tablet 1 tablet, Oral, Every 6 Hours PRN    ipratropium-albuterol (DUO-NEB) 0.5-2.5 mg/3 ml nebulizer 3 mL, Nebulization, Every 6 Hours PRN    nicotine (NICODERM CQ) 21 MG/24HR patch Place 1 patch on the skin as directed by provider Daily    predniSONE (DELTASONE) 10 MG tablet Take 2 tablets by mouth once daily for 2 days then 1 tablet by mouth once daily for 2 days.        Allergies     No Known Allergies      Review of Systems     Review of Systems   Constitutional:  Negative for activity change and fatigue.   Gastrointestinal:  Positive for abdominal distention and abdominal pain.        Vitals:    03/26/25 1347   BP: 171/100   Pulse: 94   Resp: 18   SpO2: 90%      Physical Exam  Exam conducted with a chaperone present.   Constitutional:       General: She is not in acute distress.  HENT:      Head: Normocephalic and atraumatic.   Pulmonary:      Effort: Pulmonary effort is normal. No respiratory distress.   Genitourinary:     Pubic Area: No rash.       Labia:         Right: No rash or lesion.         Left: No lesion.       Comments: No mass palpated or seen on exam. She is sp hysterectomy. Vaginal vault intact without signs of recurrence or recent bleeding.   Lymphadenopathy:      Lower Body: No right inguinal adenopathy. No left inguinal adenopathy.   Neurological:      Mental Status: She is alert and oriented to person, place, and time. Mental status is at baseline.   Psychiatric:         Mood and Affect: Mood normal.         Behavior: Behavior normal.          ECOG:  Restricted in physically strenuous activity but ambulatory and able to carry out work of a light or sedentary nature, e.g., light house work, office work = 1        Assessment  and Plan     Assessment:        Xin Nj is a 59 y.o. female who was last seen in our office 8/2023 by Dr. Kristofer Glover. She was diagnosed with  Carcinosarcoma of the uterus, [id number], FIGO IB  in 2021 and finished 50.4 Gy in 28 fractions to pelvis, completed 07/13/2021. She has not followed up with medical oncology. She presents for routine imaging review and follow-up.     Diagnoses and all orders for this visit:    1. Malignant neoplasm of endometrium (Primary)  -     CT Chest With Contrast Diagnostic; Future  -     CT Abdomen Pelvis With Contrast; Future       Plan:      Orders:  - CT CAP in 6 months  - Follow-up after imaging  - Discussed conservative management strategies for her abdominal discomfort. If symptoms do not improve, discussed option of GI referral    The patient has no clinical or radiographic evidence of disease recurrence.  We discussed plans for continued follow-up with repeat imaging in 6 months and repeat physical exam at that same time.  The patient reported some ongoing symptoms of abdominal pain and distention.  We discussed conservative management options including dietary journals and dietary changes.  We also discussed over-the-counter options to try and improve her abdominal discomfort and irregularities.  If her symptoms do not improve with conservative management, I encouraged the patient to reach out.  We can send the patient for referral to a GI doctor for further evaluation.  She expressed understanding.  She is encouraged reach out with questions or concerns prior to her next appointment.  She is provided with a vaginal dilator and instructions on how to use it.    I spent 45 minutes caring for Xin on this date of service. This time includes time spent by me in the following activities:preparing for the visit, reviewing tests, obtaining and/or reviewing a separately obtained history, performing a medically appropriate examination and/or evaluation , counseling and educating the  patient/family/caregiver, ordering medications, tests, or procedures, documenting information in the medical record, and independently interpreting results and communicating that information with the patient/family/caregiver      Follow-Up     No follow-ups on file.     CC: Park Dubose,*

## 2025-03-24 ENCOUNTER — HOSPITAL ENCOUNTER (OUTPATIENT)
Dept: PET IMAGING | Facility: HOSPITAL | Age: 60
Discharge: HOME | End: 2025-03-24
Admitting: INTERNAL MEDICINE
Payer: MEDICARE

## 2025-03-24 DIAGNOSIS — C54.1 MALIGNANT NEOPLASM OF ENDOMETRIUM: ICD-10-CM

## 2025-03-24 PROCEDURE — 74177 CT ABD & PELVIS W/CONTRAST: CPT

## 2025-03-24 PROCEDURE — 71260 CT THORAX DX C+: CPT

## 2025-03-24 PROCEDURE — 25510000001 IOPAMIDOL PER 1 ML: Performed by: INTERNAL MEDICINE

## 2025-03-24 RX ORDER — IOPAMIDOL 755 MG/ML
100 INJECTION, SOLUTION INTRAVASCULAR
Status: COMPLETED | OUTPATIENT
Start: 2025-03-24 | End: 2025-03-24

## 2025-03-24 RX ADMIN — IOPAMIDOL 100 ML: 755 INJECTION, SOLUTION INTRAVENOUS at 14:01

## 2025-03-26 ENCOUNTER — OFFICE VISIT (OUTPATIENT)
Dept: RADIATION ONCOLOGY | Facility: HOSPITAL | Age: 60
End: 2025-03-26
Payer: MEDICARE

## 2025-03-26 VITALS
BODY MASS INDEX: 25.39 KG/M2 | HEART RATE: 94 BPM | WEIGHT: 167 LBS | OXYGEN SATURATION: 90 % | SYSTOLIC BLOOD PRESSURE: 171 MMHG | DIASTOLIC BLOOD PRESSURE: 100 MMHG | RESPIRATION RATE: 18 BRPM

## 2025-03-26 DIAGNOSIS — C54.1 MALIGNANT NEOPLASM OF ENDOMETRIUM: Primary | ICD-10-CM

## 2025-03-26 PROCEDURE — G0463 HOSPITAL OUTPT CLINIC VISIT: HCPCS | Performed by: INTERNAL MEDICINE

## 2025-04-18 ENCOUNTER — TRANSCRIBE ORDERS (OUTPATIENT)
Dept: ADMINISTRATIVE | Facility: HOSPITAL | Age: 60
End: 2025-04-18
Payer: MEDICARE

## 2025-04-18 DIAGNOSIS — R52 GENERALIZED PAIN: Primary | ICD-10-CM
